# Patient Record
Sex: FEMALE | Race: WHITE | NOT HISPANIC OR LATINO | Employment: FULL TIME | URBAN - METROPOLITAN AREA
[De-identification: names, ages, dates, MRNs, and addresses within clinical notes are randomized per-mention and may not be internally consistent; named-entity substitution may affect disease eponyms.]

---

## 2017-05-16 ENCOUNTER — TELEPHONE (OUTPATIENT)
Dept: FAMILY MEDICINE | Facility: CLINIC | Age: 62
End: 2017-05-16

## 2017-05-16 NOTE — TELEPHONE ENCOUNTER
5/16/2017    Call Regarding Preventive Health Screening Mammogram    Attempt 1    Message No voicemail left - VIP      Comments: VIP Mammogram Patient - schedule in VIP slots only            Outreach   MARTIN

## 2017-05-23 NOTE — TELEPHONE ENCOUNTER
Patient declined. Per patient, completes all mammograms outside of [place].    Outreach ,  Natalie Edwards

## 2018-05-10 ENCOUNTER — TRANSFERRED RECORDS (OUTPATIENT)
Dept: HEALTH INFORMATION MANAGEMENT | Facility: CLINIC | Age: 63
End: 2018-05-10

## 2018-06-01 ENCOUNTER — TRANSFERRED RECORDS (OUTPATIENT)
Dept: HEALTH INFORMATION MANAGEMENT | Facility: CLINIC | Age: 63
End: 2018-06-01

## 2018-07-01 ENCOUNTER — TRANSFERRED RECORDS (OUTPATIENT)
Dept: HEALTH INFORMATION MANAGEMENT | Facility: CLINIC | Age: 63
End: 2018-07-01

## 2018-07-01 LAB — PAP SMEAR - HIM PATIENT REPORTED: NEGATIVE

## 2018-08-01 ENCOUNTER — TRANSFERRED RECORDS (OUTPATIENT)
Dept: HEALTH INFORMATION MANAGEMENT | Facility: CLINIC | Age: 63
End: 2018-08-01

## 2019-01-09 ENCOUNTER — OFFICE VISIT (OUTPATIENT)
Dept: FAMILY MEDICINE | Facility: CLINIC | Age: 64
End: 2019-01-09
Payer: COMMERCIAL

## 2019-01-09 VITALS
TEMPERATURE: 97.2 F | SYSTOLIC BLOOD PRESSURE: 156 MMHG | BODY MASS INDEX: 29.12 KG/M2 | HEART RATE: 60 BPM | WEIGHT: 175 LBS | DIASTOLIC BLOOD PRESSURE: 74 MMHG

## 2019-01-09 DIAGNOSIS — M62.830 SPASM OF BACK MUSCLES: ICD-10-CM

## 2019-01-09 DIAGNOSIS — M25.511 ACUTE PAIN OF RIGHT SHOULDER: Primary | ICD-10-CM

## 2019-01-09 PROCEDURE — 99214 OFFICE O/P EST MOD 30 MIN: CPT | Performed by: NURSE PRACTITIONER

## 2019-01-09 RX ORDER — CYCLOBENZAPRINE HCL 5 MG
5-10 TABLET ORAL 3 TIMES DAILY PRN
Qty: 40 TABLET | Refills: 0 | Status: ON HOLD | OUTPATIENT
Start: 2019-01-09 | End: 2019-03-11

## 2019-01-09 NOTE — PROGRESS NOTES
"  SUBJECTIVE:                                                      Yessy Kuhn is a 63 year old female who presents to clinic today for the following health issues:    Joint Pain    Onset: 2 weeks ago     Description:   Location: right shoulder  Character: \"deep pain\"     Intensity: severe, at night, moderate during the day     Progression of Symptoms: worse    Accompanying Signs & Symptoms:  Other symptoms: radiation of pain to lower arm    History:   Previous similar pain: YES      Precipitating factors:   Trauma or overuse: no     Alleviating factors:  Improved by: massage    Therapies Tried and outcome: PT, chiropractor, massage, left over oxycodone, ibuprofen, ice, heat       HPI: New right shoulder pain started about two weeks ago. Works out with a  at the gym, but otherwise, no recalled trauma or overuse pattern. Pain does get worse with workouts. She felt like she may have been getting slightly better until yesterday after she completed a fitness assessment at her gym. Describes the pain and \"deep\" within her upper back / shoulder. She has a lot of trouble buckling seatbelt and fastening bra strap. It is also present at rest and most pervasive while supine. It has kept her from getting much sleep in the past couple weeks. She also notes radiation of pain down her right arm and into her hand. No muscle weakness noted. Has longstanding neck issues, as well as knee problems. Has done PT, traction, massage, chiropractic care, etc. for these issues. No recent imaging of neck or shoulders. Her chiropractor does therapeutic neuromuscular massage, which has yet to prove to be of much benefit for her during this episode.     Problem list and histories reviewed & adjusted, as indicated.  Additional history: as documented    Reviewed and updated as needed this visit by clinical staff  Tobacco  Allergies  Meds  Problems  Med Hx  Surg Hx  Fam Hx       Reviewed and updated as needed this visit by " Provider  Tobacco  Allergies  Meds  Problems  Med Hx  Surg Hx  Fam Hx         ROS:  Constitutional, musculoskeletal, neuro systems are negative, except as otherwise noted.      OBJECTIVE:                                                      /74   Pulse 60   Temp 97.2  F (36.2  C) (Tympanic)   Wt 79.4 kg (175 lb)   LMP 04/17/2007 (Approximate)   BMI 29.12 kg/m   Body mass index is 29.12 kg/m .   GENERAL: healthy, alert, well nourished, well hydrated, no distress  MS: right upper back / shoulder. Palpable, tender myofascial knot near inferomedial scapular border. No masses, deformities, heat, erythema, crepitus, laxity, malalignment, or asymmetry associated with shoulder joint proper. Shoulder ROM limited by pain, especially with internal rotation.   NEURO: strength and tone- normal, sensory exam- grossly normal, mentation- intact, speech- normal, reflexes- symmetric    Diagnostic test results:  none     ASSESSMENT/PLAN:                                                      Yessy was seen today for shoulder pain. Not likely an intra-articular issue. Based on history and exam, most likely etiology is strain / spasm of a back muscle or rotator cuff muscle (teres minor or infraspinatus based on location of tenderness and knot). She is quite concerned given her musculoskeletal history and ongoing issues, so she would like to see sports medicine to see if imaging and other more aggressive modalities are indicated and available. This is reasonable. Referral order placed. In the meantime, suggest rest (no workout sessions), gentle stretch, alternation of ice and heat, continuing with chiro and PT, using Aleve 440 mg bid for a limited time, and using Flexeril for muscle spasm and sleep promotion. Discussed reasons to call or return to clinic. Yessy acknowledges and demonstrates understanding of circumstances under which care should be sought urgently or emergently. Follow up as discussed. Discussed risks,  benefits, alternatives, potential side effects, and proper administration of new medication / treatment. Agrees with plan of care. All questions answered.     Diagnoses and all orders for this visit:    Acute pain of right shoulder  -     cyclobenzaprine (FLEXERIL) 5 MG tablet; Take 1-2 tablets (5-10 mg) by mouth 3 times daily as needed for muscle spasms  -     SPORTS MEDICINE REFERRAL    Spasm of back muscles  -     cyclobenzaprine (FLEXERIL) 5 MG tablet; Take 1-2 tablets (5-10 mg) by mouth 3 times daily as needed for muscle spasms      Risks, benefits and alternatives of treatments discussed. Plan agreed upon and all questions answered.      Follow-Up: Return in about 4 weeks (around 2/6/2019).    See Patient Instructions        JAMMIE Pardo, CNP

## 2019-01-09 NOTE — PATIENT INSTRUCTIONS
1. Continue with chiro and PT.  2. Alternate ice and heat over upper back / shoulder  3. Use Aleve 440 mg twice a day for a week or two  4. Try muscle relaxant (no driving)  5. See sports medicine

## 2019-01-09 NOTE — Clinical Note
Please abstract the following data from this visit with this patient into the appropriate field in Epic:Mammogram done on this date: 6/2018 (approximately), by this group: [address] [place] [address] , results were normal. Dexa bone scan done at [address] [place] [address] 8/2018 results were normal

## 2019-03-04 ENCOUNTER — HOSPITAL ENCOUNTER (OUTPATIENT)
Dept: LAB | Facility: CLINIC | Age: 64
Discharge: HOME | End: 2019-03-04
Attending: ORTHOPAEDIC SURGERY | Admitting: ORTHOPAEDIC SURGERY
Payer: COMMERCIAL

## 2019-03-04 DIAGNOSIS — Z01.812 PRE-OPERATIVE LABORATORY EXAMINATION: ICD-10-CM

## 2019-03-04 LAB
MRSA DNA SPEC QL NAA+PROBE: NEGATIVE
SPECIMEN SOURCE: NORMAL

## 2019-03-04 PROCEDURE — 40000829 ZZHCL STATISTIC STAPH AUREUS SUSCEPT SCREEN PCR: Performed by: ORTHOPAEDIC SURGERY

## 2019-03-04 PROCEDURE — 87641 MR-STAPH DNA AMP PROBE: CPT | Performed by: ORTHOPAEDIC SURGERY

## 2019-03-04 PROCEDURE — 40000830 ZZHCL STATISTIC STAPH AUREUS METH RESIST SCREEN PCR: Performed by: ORTHOPAEDIC SURGERY

## 2019-03-04 PROCEDURE — 87640 STAPH A DNA AMP PROBE: CPT | Performed by: ORTHOPAEDIC SURGERY

## 2019-03-04 RX ORDER — ACETAMINOPHEN 325 MG/1
650 TABLET ORAL 2 TIMES DAILY PRN
COMMUNITY

## 2019-03-05 ENCOUNTER — TELEPHONE (OUTPATIENT)
Dept: FAMILY MEDICINE | Facility: CLINIC | Age: 64
End: 2019-03-05

## 2019-03-05 ENCOUNTER — OFFICE VISIT (OUTPATIENT)
Dept: FAMILY MEDICINE | Facility: CLINIC | Age: 64
End: 2019-03-05
Payer: COMMERCIAL

## 2019-03-05 VITALS
SYSTOLIC BLOOD PRESSURE: 130 MMHG | BODY MASS INDEX: 27.46 KG/M2 | TEMPERATURE: 96 F | OXYGEN SATURATION: 99 % | HEART RATE: 59 BPM | DIASTOLIC BLOOD PRESSURE: 80 MMHG | WEIGHT: 165 LBS

## 2019-03-05 DIAGNOSIS — M17.11 PRIMARY OSTEOARTHRITIS OF RIGHT KNEE: ICD-10-CM

## 2019-03-05 DIAGNOSIS — Z01.818 PREOPERATIVE EXAMINATION: Primary | ICD-10-CM

## 2019-03-05 LAB
ANION GAP SERPL CALCULATED.3IONS-SCNC: 7 MMOL/L (ref 3–14)
BUN SERPL-MCNC: 15 MG/DL (ref 7–30)
CALCIUM SERPL-MCNC: 9.4 MG/DL (ref 8.5–10.1)
CHLORIDE SERPL-SCNC: 107 MMOL/L (ref 94–109)
CO2 SERPL-SCNC: 27 MMOL/L (ref 20–32)
CREAT SERPL-MCNC: 0.65 MG/DL (ref 0.52–1.04)
GFR SERPL CREATININE-BSD FRML MDRD: >90 ML/MIN/{1.73_M2}
GLUCOSE SERPL-MCNC: 87 MG/DL (ref 70–99)
HGB BLD-MCNC: 13.3 G/DL (ref 11.7–15.7)
INR PPP: 1 (ref 0.86–1.14)
PLATELET # BLD AUTO: 179 10E9/L (ref 150–450)
POTASSIUM SERPL-SCNC: 4.2 MMOL/L (ref 3.4–5.3)
SODIUM SERPL-SCNC: 141 MMOL/L (ref 133–144)

## 2019-03-05 PROCEDURE — 99214 OFFICE O/P EST MOD 30 MIN: CPT | Performed by: NURSE PRACTITIONER

## 2019-03-05 PROCEDURE — 85610 PROTHROMBIN TIME: CPT | Performed by: NURSE PRACTITIONER

## 2019-03-05 PROCEDURE — 85018 HEMOGLOBIN: CPT | Performed by: NURSE PRACTITIONER

## 2019-03-05 PROCEDURE — 93000 ELECTROCARDIOGRAM COMPLETE: CPT | Performed by: NURSE PRACTITIONER

## 2019-03-05 PROCEDURE — 36415 COLL VENOUS BLD VENIPUNCTURE: CPT | Performed by: NURSE PRACTITIONER

## 2019-03-05 PROCEDURE — 85049 AUTOMATED PLATELET COUNT: CPT | Performed by: NURSE PRACTITIONER

## 2019-03-05 PROCEDURE — 80048 BASIC METABOLIC PNL TOTAL CA: CPT | Performed by: NURSE PRACTITIONER

## 2019-03-05 NOTE — PROGRESS NOTES
[facility] [place]  [address] [address]  [phone]  Dept: [phone]    PRE-OP EVALUATION:  Today's date: 3/5/2019    Yessy Kuhn (: 1955) presents for pre-operative evaluation assessment as requested by Dr. Cohn . She requires evaluation and anesthesia risk assessment prior to undergoing surgery/procedure for treatment of severe degenerative joint disease (right knee) .    Proposed Surgery/ Procedure: RIGHT TOTAL KNEE ARTHROPLASTY (OCONNELL AND NEPHEW)  Date of Surgery/ Procedure: 3/11/19  Time of Surgery/ Procedure: 10:05 am   Hospital/Surgical Facility: [facility]     Primary Physician: [facility], [facility]  Type of Anesthesia Anticipated: General    Patient has a Health Care Directive or Living Will:  NO    1. NO - Do you have a history of heart attack, stroke, stent, bypass or surgery on an artery in the head, neck, heart or legs?  2. NO - Do you ever have any pain or discomfort in your chest?  3. NO - Do you have a history of  Heart Failure?  4. NO - Are you troubled by shortness of breath when: walking on the level, up a slight hill or at night?  5. NO - Do you currently have a cold, bronchitis or other respiratory infection?  6. NO - Do you have a cough, shortness of breath or wheezing?  7. NO - Do you sometimes get pains in the calves of your legs when you walk?  8. NO - Do you or anyone in your family have previous history of blood clots?  9. NO - Do you or does anyone in your family have a serious bleeding problem such as prolonged bleeding following surgeries or cuts?  10. NO - Have you ever had problems with anemia or been told to take iron pills?  11. NO - Have you had any abnormal blood loss such as black, tarry or bloody stools, or abnormal vaginal bleeding?  12. NO - Have you ever had a blood transfusion?  13. NO - Have you or any of your relatives ever had problems with anesthesia?  14. NO - Do you have sleep apnea, excessive snoring  or daytime drowsiness?  15. NO - Do you have any prosthetic heart valves?  16. NO - Do you have prosthetic joints?  17. NO - Is there any chance that you may be pregnant?      HPI:     HPI related to upcoming procedure: Severe arthritis of right knee (progressive).      See problem list for active medical problems.  Problems all longstanding and stable, except as noted/documented.  See ROS for pertinent symptoms related to these conditions.                                                                                                                                                          .    MEDICAL HISTORY:     Patient Active Problem List    Diagnosis Date Noted     Obesity, Class II, BMI 35-39.9 04/18/2016     Priority: Medium     CARDIOVASCULAR SCREENING; LDL GOAL LESS THAN 160 10/31/2010     Priority: Medium     Herniated cervical intervertebral disc 07/16/2008     Priority: Medium     Mitral valve disorder      Priority: Medium     prolapse  Problem list name updated by automated process. Provider to review        Past Medical History:   Diagnosis Date     Irritable bowel syndrome     constipation     Mitral valve prolapse      Presence of intrauterine contraceptive device   1997 removed 2014     Past Surgical History:   Procedure Laterality Date     CHOLECYSTECTOMY, OPEN  1991     Current Outpatient Medications   Medication Sig Dispense Refill     acetaminophen (TYLENOL) 325 MG tablet Take 650 mg by mouth 2 times daily as needed for mild pain       melatonin 5 MG tablet Take 10 mg by mouth nightly as needed for sleep (2 x 5mg = 10mg)       Nutritional Supplements (NUTRITIONAL SUPPLEMENT PO) Take 2 capsules by mouth every morning Product contains Calcium 756mg                               Vitamin D 1,600 unit(s)                               Magnesium 386mg                               Vitamin K 100mcg                               Vitamin C 50mg       OTC products: None, except as noted  above    Allergies   Allergen Reactions     Antihistamine [Alkylamines]      Emotional reactions      Latex Allergy: NO    Social History     Tobacco Use     Smoking status: Never Smoker     Smokeless tobacco: Never Used   Substance Use Topics     Alcohol use: No     Alcohol/week: 0.0 oz     History   Drug Use No       REVIEW OF SYSTEMS:   CONSTITUTIONAL: NEGATIVE for fever, chills, change in weight  INTEGUMENTARY/SKIN: NEGATIVE for worrisome rashes, moles or lesions  EYES: NEGATIVE for vision changes or irritation  ENT/MOUTH: NEGATIVE for ear, mouth and throat problems  RESP: NEGATIVE for significant cough or SOB  BREAST: NEGATIVE for masses, tenderness or discharge  CV: NEGATIVE for chest pain, palpitations or peripheral edema  GI: NEGATIVE for nausea, abdominal pain, heartburn, or change in bowel habits  : NEGATIVE for frequency, dysuria, or hematuria  MUSCULOSKELETAL: NEGATIVE for significant arthralgias or myalgia (with the exception of ongoing pain / stiffness of right knee noted in HPI)  NEURO: NEGATIVE for weakness, dizziness or paresthesias  ENDOCRINE: NEGATIVE for temperature intolerance, skin/hair changes  HEME: NEGATIVE for bleeding problems  PSYCHIATRIC: NEGATIVE for changes in mood or affect    EXAM:   /80 (BP Location: Left arm, Patient Position: Chair, Cuff Size: Adult Regular)   Pulse 59   Temp 96  F (35.6  C) (Tympanic)   Wt 74.8 kg (165 lb)   LMP 04/17/2007 (Approximate)   SpO2 99%   BMI 27.46 kg/m      GENERAL APPEARANCE: healthy, alert and no distress     EYES: EOMI, PERRL     HENT: ear canals and TM's normal and nose and mouth without ulcers or lesions     NECK: no adenopathy, no asymmetry, masses, or scars and thyroid normal to palpation     RESP: lungs clear to auscultation - no rales, rhonchi or wheezes     CV: regular rates and rhythm, normal S1 S2, no S3 or S4. Grade II / VI systolic murmur     ABDOMEN:  soft, nontender, no HSM or masses and bowel sounds normal     MS:  extremities normal- no gross deformities noted, no evidence of inflammation in joints, FROM in all extremities.     SKIN: no suspicious lesions or rashes     NEURO: Normal strength and tone, sensory exam grossly normal, mentation intact and speech normal     PSYCH: mentation appears normal. and affect normal/bright     LYMPHATICS: No cervical adenopathy    DIAGNOSTICS:   EKG: appears normal, sinus bradycardia, normal axis, normal intervals, no acute ST/T changes c/w ischemia, no LVH by voltage criteria    Hemoglobin  Platelets  Serum Potassium  Serum Creatinine  INR    **These labs were ordered but results are pending at the time of this note's filing. Please see laboratory tab under patient chart review for most up to date lab results.     IMPRESSION:   Reason for surgery/procedure: Severe arthritis right knee  Diagnosis/reason for consult: Preoperative clearance    The proposed surgical procedure is considered INTERMEDIATE risk.    REVISED CARDIAC RISK INDEX  The patient has the following serious cardiovascular risks for perioperative complications such as (MI, PE, VFib and 3  AV Block):  No serious cardiac risks  INTERPRETATION: 0 risks: Class I (very low risk - 0.4% complication rate)    The patient has the following additional risks for perioperative complications:  No identified additional risks    **FYI - known systolic murmur associated with mitral valve prolapse. Has been stable, and she is not symptomatic in any way.      ICD-10-CM    1. Preoperative examination Z01.818 EKG 12-lead complete w/read - Clinics     Hemoglobin     Platelet count     INR     Basic metabolic panel   2. Primary osteoarthritis of right knee M17.11        RECOMMENDATIONS:     --Patient is to take all scheduled medications on the day of surgery EXCEPT for modifications listed below.    APPROVAL GIVEN to proceed with proposed procedure, without further diagnostic evaluation       Signed Electronically by: Jone Wright NP    Copy  of this evaluation report is provided to requesting physician.    [place] Preop Guidelines    Revised Cardiac Risk Index

## 2019-03-05 NOTE — TELEPHONE ENCOUNTER
Patient wanting health care directive notarized. I advised her what hours I will be here this week and that she can stop in at her convenience.   Reshma Uribe RN   [facility] - Triage

## 2019-03-05 NOTE — PATIENT INSTRUCTIONS
Before Your Surgery      Call your surgeon if there is any change in your health. This includes signs of a cold or flu (such as a sore throat, runny nose, cough, rash or fever).    Do not smoke, drink alcohol or take over the counter medicine (unless your surgeon or primary care doctor tells you to) for the 24 hours before and after surgery.    If you take prescribed drugs: Follow your doctor s orders about which medicines to take and which to stop until after surgery.    Eating and drinking prior to surgery: follow the instructions from your surgeon    Take a shower or bath the night before surgery. Use the soap your surgeon gave you to gently clean your skin. If you do not have soap from your surgeon, use your regular soap. Do not shave or scrub the surgery site.  Wear clean pajamas and have clean sheets on your bed.       Best of luck! Let me know if I can do anything else for you!

## 2019-03-05 NOTE — TELEPHONE ENCOUNTER
Reason for Call:  Other call back    Detailed comments: Pt wanting to come in to have a health care directive signed    Phone Number Patient can be reached at: Home number on file [phone] (home)    Best Time: anytime    Can we leave a detailed message on this number? YES    Call taken on 3/5/2019 at 7:58 AM by Rosanna Barrett

## 2019-03-05 NOTE — H&P (VIEW-ONLY)
[facility] [place]  [address] [address]  [phone]  Dept: [phone]    PRE-OP EVALUATION:  Today's date: 3/5/2019    Yessy Kuhn (: 1955) presents for pre-operative evaluation assessment as requested by Dr. Cohn . She requires evaluation and anesthesia risk assessment prior to undergoing surgery/procedure for treatment of severe degenerative joint disease (right knee) .    Proposed Surgery/ Procedure: RIGHT TOTAL KNEE ARTHROPLASTY (OCONNELL AND NEPHEW)  Date of Surgery/ Procedure: 3/11/19  Time of Surgery/ Procedure: 10:05 am   Hospital/Surgical Facility: [facility]     Primary Physician: [facility], [place] [facility] [place]  Type of Anesthesia Anticipated: General    Patient has a Health Care Directive or Living Will:  NO    1. NO - Do you have a history of heart attack, stroke, stent, bypass or surgery on an artery in the head, neck, heart or legs?  2. NO - Do you ever have any pain or discomfort in your chest?  3. NO - Do you have a history of  Heart Failure?  4. NO - Are you troubled by shortness of breath when: walking on the level, up a slight hill or at night?  5. NO - Do you currently have a cold, bronchitis or other respiratory infection?  6. NO - Do you have a cough, shortness of breath or wheezing?  7. NO - Do you sometimes get pains in the calves of your legs when you walk?  8. NO - Do you or anyone in your family have previous history of blood clots?  9. NO - Do you or does anyone in your family have a serious bleeding problem such as prolonged bleeding following surgeries or cuts?  10. NO - Have you ever had problems with anemia or been told to take iron pills?  11. NO - Have you had any abnormal blood loss such as black, tarry or bloody stools, or abnormal vaginal bleeding?  12. NO - Have you ever had a blood transfusion?  13. NO - Have you or any of your relatives ever had problems with anesthesia?  14. NO - Do you have sleep apnea, excessive snoring  or daytime drowsiness?  15. NO - Do you have any prosthetic heart valves?  16. NO - Do you have prosthetic joints?  17. NO - Is there any chance that you may be pregnant?      HPI:     HPI related to upcoming procedure: Severe arthritis of right knee (progressive).      See problem list for active medical problems.  Problems all longstanding and stable, except as noted/documented.  See ROS for pertinent symptoms related to these conditions.                                                                                                                                                          .    MEDICAL HISTORY:     Patient Active Problem List    Diagnosis Date Noted     Obesity, Class II, BMI 35-39.9 04/18/2016     Priority: Medium     CARDIOVASCULAR SCREENING; LDL GOAL LESS THAN 160 10/31/2010     Priority: Medium     Herniated cervical intervertebral disc 07/16/2008     Priority: Medium     Mitral valve disorder      Priority: Medium     prolapse  Problem list name updated by automated process. Provider to review        Past Medical History:   Diagnosis Date     Irritable bowel syndrome     constipation     Mitral valve prolapse      Presence of intrauterine contraceptive device   1997 removed 2014     Past Surgical History:   Procedure Laterality Date     CHOLECYSTECTOMY, OPEN  1991     Current Outpatient Medications   Medication Sig Dispense Refill     acetaminophen (TYLENOL) 325 MG tablet Take 650 mg by mouth 2 times daily as needed for mild pain       melatonin 5 MG tablet Take 10 mg by mouth nightly as needed for sleep (2 x 5mg = 10mg)       Nutritional Supplements (NUTRITIONAL SUPPLEMENT PO) Take 2 capsules by mouth every morning Product contains Calcium 756mg                               Vitamin D 1,600 unit(s)                               Magnesium 386mg                               Vitamin K 100mcg                               Vitamin C 50mg       OTC products: None, except as noted  above    Allergies   Allergen Reactions     Antihistamine [Alkylamines]      Emotional reactions      Latex Allergy: NO    Social History     Tobacco Use     Smoking status: Never Smoker     Smokeless tobacco: Never Used   Substance Use Topics     Alcohol use: No     Alcohol/week: 0.0 oz     History   Drug Use No       REVIEW OF SYSTEMS:   CONSTITUTIONAL: NEGATIVE for fever, chills, change in weight  INTEGUMENTARY/SKIN: NEGATIVE for worrisome rashes, moles or lesions  EYES: NEGATIVE for vision changes or irritation  ENT/MOUTH: NEGATIVE for ear, mouth and throat problems  RESP: NEGATIVE for significant cough or SOB  BREAST: NEGATIVE for masses, tenderness or discharge  CV: NEGATIVE for chest pain, palpitations or peripheral edema  GI: NEGATIVE for nausea, abdominal pain, heartburn, or change in bowel habits  : NEGATIVE for frequency, dysuria, or hematuria  MUSCULOSKELETAL: NEGATIVE for significant arthralgias or myalgia (with the exception of ongoing pain / stiffness of right knee noted in HPI)  NEURO: NEGATIVE for weakness, dizziness or paresthesias  ENDOCRINE: NEGATIVE for temperature intolerance, skin/hair changes  HEME: NEGATIVE for bleeding problems  PSYCHIATRIC: NEGATIVE for changes in mood or affect    EXAM:   /80 (BP Location: Left arm, Patient Position: Chair, Cuff Size: Adult Regular)   Pulse 59   Temp 96  F (35.6  C) (Tympanic)   Wt 74.8 kg (165 lb)   LMP 04/17/2007 (Approximate)   SpO2 99%   BMI 27.46 kg/m      GENERAL APPEARANCE: healthy, alert and no distress     EYES: EOMI, PERRL     HENT: ear canals and TM's normal and nose and mouth without ulcers or lesions     NECK: no adenopathy, no asymmetry, masses, or scars and thyroid normal to palpation     RESP: lungs clear to auscultation - no rales, rhonchi or wheezes     CV: regular rates and rhythm, normal S1 S2, no S3 or S4. Grade II / VI systolic murmur     ABDOMEN:  soft, nontender, no HSM or masses and bowel sounds normal     MS:  extremities normal- no gross deformities noted, no evidence of inflammation in joints, FROM in all extremities.     SKIN: no suspicious lesions or rashes     NEURO: Normal strength and tone, sensory exam grossly normal, mentation intact and speech normal     PSYCH: mentation appears normal. and affect normal/bright     LYMPHATICS: No cervical adenopathy    DIAGNOSTICS:   EKG: appears normal, sinus bradycardia, normal axis, normal intervals, no acute ST/T changes c/w ischemia, no LVH by voltage criteria    Hemoglobin  Platelets  Serum Potassium  Serum Creatinine  INR    **These labs were ordered but results are pending at the time of this note's filing. Please see laboratory tab under patient chart review for most up to date lab results.     IMPRESSION:   Reason for surgery/procedure: Severe arthritis right knee  Diagnosis/reason for consult: Preoperative clearance    The proposed surgical procedure is considered INTERMEDIATE risk.    REVISED CARDIAC RISK INDEX  The patient has the following serious cardiovascular risks for perioperative complications such as (MI, PE, VFib and 3  AV Block):  No serious cardiac risks  INTERPRETATION: 0 risks: Class I (very low risk - 0.4% complication rate)    The patient has the following additional risks for perioperative complications:  No identified additional risks    **FYI - known systolic murmur associated with mitral valve prolapse. Has been stable, and she is not symptomatic in any way.      ICD-10-CM    1. Preoperative examination Z01.818 EKG 12-lead complete w/read - Clinics     Hemoglobin     Platelet count     INR     Basic metabolic panel   2. Primary osteoarthritis of right knee M17.11        RECOMMENDATIONS:     --Patient is to take all scheduled medications on the day of surgery EXCEPT for modifications listed below.    APPROVAL GIVEN to proceed with proposed procedure, without further diagnostic evaluation       Signed Electronically by: Jone Wright NP    Copy  of this evaluation report is provided to requesting physician.    [facility] Preop Guidelines    Revised Cardiac Risk Index

## 2019-03-07 ENCOUNTER — ANESTHESIA EVENT (OUTPATIENT)
Dept: SURGERY | Facility: CLINIC | Age: 64
DRG: 470 | End: 2019-03-07
Payer: COMMERCIAL

## 2019-03-11 ENCOUNTER — APPOINTMENT (OUTPATIENT)
Dept: GENERAL RADIOLOGY | Facility: CLINIC | Age: 64
DRG: 470 | End: 2019-03-11
Attending: ORTHOPAEDIC SURGERY
Payer: COMMERCIAL

## 2019-03-11 ENCOUNTER — APPOINTMENT (OUTPATIENT)
Dept: PHYSICAL THERAPY | Facility: CLINIC | Age: 64
DRG: 470 | End: 2019-03-11
Attending: ORTHOPAEDIC SURGERY
Payer: COMMERCIAL

## 2019-03-11 ENCOUNTER — HOSPITAL ENCOUNTER (INPATIENT)
Facility: CLINIC | Age: 64
LOS: 2 days | Discharge: HOME | DRG: 470 | End: 2019-03-13
Attending: ORTHOPAEDIC SURGERY | Admitting: ORTHOPAEDIC SURGERY
Payer: COMMERCIAL

## 2019-03-11 ENCOUNTER — ANESTHESIA (OUTPATIENT)
Dept: SURGERY | Facility: CLINIC | Age: 64
DRG: 470 | End: 2019-03-11
Payer: COMMERCIAL

## 2019-03-11 DIAGNOSIS — Z96.651 STATUS POST TOTAL RIGHT KNEE REPLACEMENT: Primary | ICD-10-CM

## 2019-03-11 PROBLEM — Z96.659 S/P TOTAL KNEE ARTHROPLASTY: Status: ACTIVE | Noted: 2019-03-11

## 2019-03-11 LAB
ANION GAP SERPL CALCULATED.3IONS-SCNC: 7 MMOL/L (ref 3–14)
BUN SERPL-MCNC: 13 MG/DL (ref 7–30)
CALCIUM SERPL-MCNC: 8.7 MG/DL (ref 8.5–10.1)
CHLORIDE SERPL-SCNC: 106 MMOL/L (ref 94–109)
CO2 SERPL-SCNC: 27 MMOL/L (ref 20–32)
CREAT SERPL-MCNC: 0.67 MG/DL (ref 0.52–1.04)
GFR SERPL CREATININE-BSD FRML MDRD: >90 ML/MIN/{1.73_M2}
GLUCOSE SERPL-MCNC: 99 MG/DL (ref 70–99)
HGB BLD-MCNC: 12.8 G/DL (ref 11.7–15.7)
PLATELET # BLD AUTO: 149 10E9/L (ref 150–450)
POTASSIUM SERPL-SCNC: 3.7 MMOL/L (ref 3.4–5.3)
SODIUM SERPL-SCNC: 140 MMOL/L (ref 133–144)

## 2019-03-11 PROCEDURE — 25000125 ZZHC RX 250: Performed by: ANESTHESIOLOGY

## 2019-03-11 PROCEDURE — 25000566 ZZH SEVOFLURANE, EA 15 MIN: Performed by: ORTHOPAEDIC SURGERY

## 2019-03-11 PROCEDURE — 80048 BASIC METABOLIC PNL TOTAL CA: CPT | Performed by: ORTHOPAEDIC SURGERY

## 2019-03-11 PROCEDURE — 25000128 H RX IP 250 OP 636: Performed by: ANESTHESIOLOGY

## 2019-03-11 PROCEDURE — 25000128 H RX IP 250 OP 636: Performed by: NURSE ANESTHETIST, CERTIFIED REGISTERED

## 2019-03-11 PROCEDURE — 37000009 ZZH ANESTHESIA TECHNICAL FEE, EACH ADDTL 15 MIN: Performed by: ORTHOPAEDIC SURGERY

## 2019-03-11 PROCEDURE — 82565 ASSAY OF CREATININE: CPT | Performed by: ORTHOPAEDIC SURGERY

## 2019-03-11 PROCEDURE — 0SRC0J9 REPLACEMENT OF RIGHT KNEE JOINT WITH SYNTHETIC SUBSTITUTE, CEMENTED, OPEN APPROACH: ICD-10-PCS | Performed by: ORTHOPAEDIC SURGERY

## 2019-03-11 PROCEDURE — 71000012 ZZH RECOVERY PHASE 1 LEVEL 1 FIRST HR: Performed by: ORTHOPAEDIC SURGERY

## 2019-03-11 PROCEDURE — 25000125 ZZHC RX 250: Performed by: NURSE ANESTHETIST, CERTIFIED REGISTERED

## 2019-03-11 PROCEDURE — 25000125 ZZHC RX 250: Performed by: ORTHOPAEDIC SURGERY

## 2019-03-11 PROCEDURE — 25000132 ZZH RX MED GY IP 250 OP 250 PS 637: Performed by: ORTHOPAEDIC SURGERY

## 2019-03-11 PROCEDURE — 85018 HEMOGLOBIN: CPT | Performed by: ORTHOPAEDIC SURGERY

## 2019-03-11 PROCEDURE — 36000063 ZZH SURGERY LEVEL 4 EA 15 ADDTL MIN: Performed by: ORTHOPAEDIC SURGERY

## 2019-03-11 PROCEDURE — 25000128 H RX IP 250 OP 636: Performed by: ORTHOPAEDIC SURGERY

## 2019-03-11 PROCEDURE — 97162 PT EVAL MOD COMPLEX 30 MIN: CPT | Mod: GP

## 2019-03-11 PROCEDURE — 12000000 ZZH R&B MED SURG/OB

## 2019-03-11 PROCEDURE — 25800030 ZZH RX IP 258 OP 636: Performed by: ORTHOPAEDIC SURGERY

## 2019-03-11 PROCEDURE — 25800030 ZZH RX IP 258 OP 636: Performed by: ANESTHESIOLOGY

## 2019-03-11 PROCEDURE — 27210794 ZZH OR GENERAL SUPPLY STERILE: Performed by: ORTHOPAEDIC SURGERY

## 2019-03-11 PROCEDURE — 25800025 ZZH RX 258: Performed by: ORTHOPAEDIC SURGERY

## 2019-03-11 PROCEDURE — 27110028 ZZH OR GENERAL SUPPLY NON-STERILE: Performed by: ORTHOPAEDIC SURGERY

## 2019-03-11 PROCEDURE — 36000093 ZZH SURGERY LEVEL 4 1ST 30 MIN: Performed by: ORTHOPAEDIC SURGERY

## 2019-03-11 PROCEDURE — 40000986 XR KNEE PORT RT 1/2 VW: Mod: RT

## 2019-03-11 PROCEDURE — 25800030 ZZH RX IP 258 OP 636: Performed by: NURSE ANESTHETIST, CERTIFIED REGISTERED

## 2019-03-11 PROCEDURE — 97530 THERAPEUTIC ACTIVITIES: CPT | Mod: GP

## 2019-03-11 PROCEDURE — 85049 AUTOMATED PLATELET COUNT: CPT | Performed by: ORTHOPAEDIC SURGERY

## 2019-03-11 PROCEDURE — 97110 THERAPEUTIC EXERCISES: CPT | Mod: GP

## 2019-03-11 PROCEDURE — 36415 COLL VENOUS BLD VENIPUNCTURE: CPT | Performed by: ORTHOPAEDIC SURGERY

## 2019-03-11 PROCEDURE — 27810169 ZZH OR IMPLANT GENERAL: Performed by: ORTHOPAEDIC SURGERY

## 2019-03-11 PROCEDURE — C1776 JOINT DEVICE (IMPLANTABLE): HCPCS | Performed by: ORTHOPAEDIC SURGERY

## 2019-03-11 PROCEDURE — 37000008 ZZH ANESTHESIA TECHNICAL FEE, 1ST 30 MIN: Performed by: ORTHOPAEDIC SURGERY

## 2019-03-11 PROCEDURE — 40000170 ZZH STATISTIC PRE-PROCEDURE ASSESSMENT II: Performed by: ORTHOPAEDIC SURGERY

## 2019-03-11 DEVICE — IMP INSERT ARTICULAR S&N LGN CR XLPE SZ3-4 9MM 71453111: Type: IMPLANTABLE DEVICE | Site: KNEE | Status: FUNCTIONAL

## 2019-03-11 DEVICE — IMP COMP PATELLA SNR GENESIS II 9X32MM 71420576: Type: IMPLANTABLE DEVICE | Site: KNEE | Status: FUNCTIONAL

## 2019-03-11 DEVICE — IMP BASEPLATE TIBIAL GENESIS II SZ 4 RT TI 71420184: Type: IMPLANTABLE DEVICE | Site: KNEE | Status: FUNCTIONAL

## 2019-03-11 DEVICE — IMP COMP FEM S&N LEGION OXIN NARROW CR SZ5N RT 71421255: Type: IMPLANTABLE DEVICE | Site: KNEE | Status: FUNCTIONAL

## 2019-03-11 DEVICE — BONE CEMENT RADIOPAQUE SIMPLEX HV FULL DOSE 6194-1-001: Type: IMPLANTABLE DEVICE | Site: KNEE | Status: FUNCTIONAL

## 2019-03-11 RX ORDER — ACETAMINOPHEN 325 MG/1
975 TABLET ORAL EVERY 8 HOURS
Status: DISCONTINUED | OUTPATIENT
Start: 2019-03-11 | End: 2019-03-13 | Stop reason: HOSPADM

## 2019-03-11 RX ORDER — NALOXONE HYDROCHLORIDE 0.4 MG/ML
.1-.4 INJECTION, SOLUTION INTRAMUSCULAR; INTRAVENOUS; SUBCUTANEOUS
Status: DISCONTINUED | OUTPATIENT
Start: 2019-03-11 | End: 2019-03-13 | Stop reason: HOSPADM

## 2019-03-11 RX ORDER — ONDANSETRON 4 MG/1
4 TABLET, ORALLY DISINTEGRATING ORAL EVERY 30 MIN PRN
Status: DISCONTINUED | OUTPATIENT
Start: 2019-03-11 | End: 2019-03-11

## 2019-03-11 RX ORDER — ONDANSETRON 4 MG/1
4 TABLET, ORALLY DISINTEGRATING ORAL EVERY 6 HOURS PRN
Status: DISCONTINUED | OUTPATIENT
Start: 2019-03-11 | End: 2019-03-13 | Stop reason: HOSPADM

## 2019-03-11 RX ORDER — LABETALOL HYDROCHLORIDE 5 MG/ML
10 INJECTION, SOLUTION INTRAVENOUS
Status: DISCONTINUED | OUTPATIENT
Start: 2019-03-11 | End: 2019-03-11

## 2019-03-11 RX ORDER — ZOLPIDEM TARTRATE 5 MG/1
5 TABLET ORAL
Status: DISCONTINUED | OUTPATIENT
Start: 2019-03-12 | End: 2019-03-13 | Stop reason: HOSPADM

## 2019-03-11 RX ORDER — FENTANYL CITRATE 50 UG/ML
100 INJECTION, SOLUTION INTRAMUSCULAR; INTRAVENOUS ONCE
Status: COMPLETED | OUTPATIENT
Start: 2019-03-11 | End: 2019-03-11

## 2019-03-11 RX ORDER — KETOROLAC TROMETHAMINE 30 MG/ML
30 INJECTION, SOLUTION INTRAMUSCULAR; INTRAVENOUS
Status: DISCONTINUED | OUTPATIENT
Start: 2019-03-11 | End: 2019-03-11

## 2019-03-11 RX ORDER — ONDANSETRON 2 MG/ML
4 INJECTION INTRAMUSCULAR; INTRAVENOUS EVERY 6 HOURS PRN
Status: DISCONTINUED | OUTPATIENT
Start: 2019-03-11 | End: 2019-03-13 | Stop reason: HOSPADM

## 2019-03-11 RX ORDER — HYDROMORPHONE HYDROCHLORIDE 1 MG/ML
.3-.5 INJECTION, SOLUTION INTRAMUSCULAR; INTRAVENOUS; SUBCUTANEOUS
Status: DISCONTINUED | OUTPATIENT
Start: 2019-03-11 | End: 2019-03-13 | Stop reason: HOSPADM

## 2019-03-11 RX ORDER — ONDANSETRON 2 MG/ML
INJECTION INTRAMUSCULAR; INTRAVENOUS PRN
Status: DISCONTINUED | OUTPATIENT
Start: 2019-03-11 | End: 2019-03-11

## 2019-03-11 RX ORDER — PROCHLORPERAZINE MALEATE 5 MG
10 TABLET ORAL EVERY 6 HOURS PRN
Status: DISCONTINUED | OUTPATIENT
Start: 2019-03-11 | End: 2019-03-13 | Stop reason: HOSPADM

## 2019-03-11 RX ORDER — CEFAZOLIN SODIUM 1 G/3ML
1 INJECTION, POWDER, FOR SOLUTION INTRAMUSCULAR; INTRAVENOUS EVERY 8 HOURS
Status: COMPLETED | OUTPATIENT
Start: 2019-03-11 | End: 2019-03-12

## 2019-03-11 RX ORDER — OXYCODONE HYDROCHLORIDE 5 MG/1
5-10 TABLET ORAL
Status: DISCONTINUED | OUTPATIENT
Start: 2019-03-11 | End: 2019-03-12

## 2019-03-11 RX ORDER — NALOXONE HYDROCHLORIDE 0.4 MG/ML
.1-.4 INJECTION, SOLUTION INTRAMUSCULAR; INTRAVENOUS; SUBCUTANEOUS
Status: DISCONTINUED | OUTPATIENT
Start: 2019-03-11 | End: 2019-03-12

## 2019-03-11 RX ORDER — PROPOFOL 10 MG/ML
INJECTION, EMULSION INTRAVENOUS PRN
Status: DISCONTINUED | OUTPATIENT
Start: 2019-03-11 | End: 2019-03-11

## 2019-03-11 RX ORDER — SODIUM CHLORIDE, SODIUM LACTATE, POTASSIUM CHLORIDE, CALCIUM CHLORIDE 600; 310; 30; 20 MG/100ML; MG/100ML; MG/100ML; MG/100ML
INJECTION, SOLUTION INTRAVENOUS CONTINUOUS
Status: DISCONTINUED | OUTPATIENT
Start: 2019-03-11 | End: 2019-03-11 | Stop reason: ALTCHOICE

## 2019-03-11 RX ORDER — ONDANSETRON 2 MG/ML
4 INJECTION INTRAMUSCULAR; INTRAVENOUS EVERY 30 MIN PRN
Status: DISCONTINUED | OUTPATIENT
Start: 2019-03-11 | End: 2019-03-11

## 2019-03-11 RX ORDER — KETOROLAC TROMETHAMINE 30 MG/ML
30 INJECTION, SOLUTION INTRAMUSCULAR; INTRAVENOUS EVERY 6 HOURS
Status: COMPLETED | OUTPATIENT
Start: 2019-03-11 | End: 2019-03-12

## 2019-03-11 RX ORDER — ACETAMINOPHEN 325 MG/1
650 TABLET ORAL EVERY 4 HOURS PRN
Status: DISCONTINUED | OUTPATIENT
Start: 2019-03-14 | End: 2019-03-13 | Stop reason: HOSPADM

## 2019-03-11 RX ORDER — CEFAZOLIN SODIUM 1 G/3ML
1 INJECTION, POWDER, FOR SOLUTION INTRAMUSCULAR; INTRAVENOUS SEE ADMIN INSTRUCTIONS
Status: DISCONTINUED | OUTPATIENT
Start: 2019-03-11 | End: 2019-03-11

## 2019-03-11 RX ORDER — EPHEDRINE SULFATE 50 MG/ML
INJECTION, SOLUTION INTRAMUSCULAR; INTRAVENOUS; SUBCUTANEOUS PRN
Status: DISCONTINUED | OUTPATIENT
Start: 2019-03-11 | End: 2019-03-11

## 2019-03-11 RX ORDER — LIDOCAINE 40 MG/G
CREAM TOPICAL
Status: DISCONTINUED | OUTPATIENT
Start: 2019-03-11 | End: 2019-03-13 | Stop reason: HOSPADM

## 2019-03-11 RX ORDER — HYDROXYZINE HYDROCHLORIDE 25 MG/1
50 TABLET, FILM COATED ORAL EVERY 4 HOURS PRN
Status: DISCONTINUED | OUTPATIENT
Start: 2019-03-11 | End: 2019-03-13 | Stop reason: HOSPADM

## 2019-03-11 RX ORDER — FENTANYL CITRATE 50 UG/ML
INJECTION, SOLUTION INTRAMUSCULAR; INTRAVENOUS PRN
Status: DISCONTINUED | OUTPATIENT
Start: 2019-03-11 | End: 2019-03-11

## 2019-03-11 RX ORDER — FENTANYL CITRATE 50 UG/ML
25-50 INJECTION, SOLUTION INTRAMUSCULAR; INTRAVENOUS
Status: DISCONTINUED | OUTPATIENT
Start: 2019-03-11 | End: 2019-03-11

## 2019-03-11 RX ORDER — HYDROMORPHONE HYDROCHLORIDE 1 MG/ML
.3-.5 INJECTION, SOLUTION INTRAMUSCULAR; INTRAVENOUS; SUBCUTANEOUS EVERY 5 MIN PRN
Status: DISCONTINUED | OUTPATIENT
Start: 2019-03-11 | End: 2019-03-11

## 2019-03-11 RX ORDER — DEXAMETHASONE SODIUM PHOSPHATE 4 MG/ML
4 INJECTION, SOLUTION INTRA-ARTICULAR; INTRALESIONAL; INTRAMUSCULAR; INTRAVENOUS; SOFT TISSUE
Status: DISCONTINUED | OUTPATIENT
Start: 2019-03-11 | End: 2019-03-11

## 2019-03-11 RX ORDER — PROPOFOL 10 MG/ML
INJECTION, EMULSION INTRAVENOUS CONTINUOUS PRN
Status: DISCONTINUED | OUTPATIENT
Start: 2019-03-11 | End: 2019-03-11

## 2019-03-11 RX ORDER — HYDRALAZINE HYDROCHLORIDE 20 MG/ML
2.5-5 INJECTION INTRAMUSCULAR; INTRAVENOUS EVERY 10 MIN PRN
Status: DISCONTINUED | OUTPATIENT
Start: 2019-03-11 | End: 2019-03-11

## 2019-03-11 RX ORDER — DIPHENHYDRAMINE HCL 25 MG
25 CAPSULE ORAL EVERY 6 HOURS PRN
Status: DISCONTINUED | OUTPATIENT
Start: 2019-03-11 | End: 2019-03-13 | Stop reason: HOSPADM

## 2019-03-11 RX ORDER — AMOXICILLIN 250 MG
2 CAPSULE ORAL 2 TIMES DAILY
Status: DISCONTINUED | OUTPATIENT
Start: 2019-03-11 | End: 2019-03-13 | Stop reason: HOSPADM

## 2019-03-11 RX ORDER — AMOXICILLIN 250 MG
1 CAPSULE ORAL 2 TIMES DAILY
Status: DISCONTINUED | OUTPATIENT
Start: 2019-03-11 | End: 2019-03-13 | Stop reason: HOSPADM

## 2019-03-11 RX ORDER — HYDROXYZINE HYDROCHLORIDE 25 MG/1
25 TABLET, FILM COATED ORAL EVERY 6 HOURS PRN
Status: DISCONTINUED | OUTPATIENT
Start: 2019-03-11 | End: 2019-03-13 | Stop reason: HOSPADM

## 2019-03-11 RX ORDER — DEXTROSE MONOHYDRATE, SODIUM CHLORIDE, AND POTASSIUM CHLORIDE 50; 1.49; 4.5 G/1000ML; G/1000ML; G/1000ML
INJECTION, SOLUTION INTRAVENOUS CONTINUOUS
Status: DISCONTINUED | OUTPATIENT
Start: 2019-03-11 | End: 2019-03-12 | Stop reason: CLARIF

## 2019-03-11 RX ORDER — DIPHENHYDRAMINE HYDROCHLORIDE 50 MG/ML
25 INJECTION INTRAMUSCULAR; INTRAVENOUS EVERY 6 HOURS PRN
Status: DISCONTINUED | OUTPATIENT
Start: 2019-03-11 | End: 2019-03-13 | Stop reason: HOSPADM

## 2019-03-11 RX ORDER — CEFAZOLIN SODIUM 2 G/100ML
2 INJECTION, SOLUTION INTRAVENOUS
Status: COMPLETED | OUTPATIENT
Start: 2019-03-11 | End: 2019-03-11

## 2019-03-11 RX ADMIN — ONDANSETRON 4 MG: 2 INJECTION INTRAMUSCULAR; INTRAVENOUS at 12:17

## 2019-03-11 RX ADMIN — PHENYLEPHRINE HYDROCHLORIDE 100 MCG: 10 INJECTION, SOLUTION INTRAMUSCULAR; INTRAVENOUS; SUBCUTANEOUS at 12:07

## 2019-03-11 RX ADMIN — PROPOFOL 30 MG: 10 INJECTION, EMULSION INTRAVENOUS at 10:59

## 2019-03-11 RX ADMIN — CEFAZOLIN SODIUM 2 G: 2 INJECTION, SOLUTION INTRAVENOUS at 11:00

## 2019-03-11 RX ADMIN — Medication 5 MG: at 11:11

## 2019-03-11 RX ADMIN — LIDOCAINE HYDROCHLORIDE 1 ML: 10 INJECTION, SOLUTION EPIDURAL; INFILTRATION; INTRACAUDAL; PERINEURAL at 09:58

## 2019-03-11 RX ADMIN — SODIUM CHLORIDE, POTASSIUM CHLORIDE, SODIUM LACTATE AND CALCIUM CHLORIDE: 600; 310; 30; 20 INJECTION, SOLUTION INTRAVENOUS at 11:53

## 2019-03-11 RX ADMIN — PHENYLEPHRINE HYDROCHLORIDE 50 MCG: 10 INJECTION, SOLUTION INTRAMUSCULAR; INTRAVENOUS; SUBCUTANEOUS at 11:15

## 2019-03-11 RX ADMIN — PHENYLEPHRINE HYDROCHLORIDE 100 MCG: 10 INJECTION, SOLUTION INTRAMUSCULAR; INTRAVENOUS; SUBCUTANEOUS at 11:44

## 2019-03-11 RX ADMIN — KETOROLAC TROMETHAMINE 30 MG: 30 INJECTION, SOLUTION INTRAMUSCULAR at 15:20

## 2019-03-11 RX ADMIN — SENNOSIDES AND DOCUSATE SODIUM 2 TABLET: 8.6; 5 TABLET ORAL at 19:58

## 2019-03-11 RX ADMIN — OXYCODONE HYDROCHLORIDE 5 MG: 5 TABLET ORAL at 19:58

## 2019-03-11 RX ADMIN — Medication 10 MG: at 11:15

## 2019-03-11 RX ADMIN — PROPOFOL 100 MCG/KG/MIN: 10 INJECTION, EMULSION INTRAVENOUS at 10:57

## 2019-03-11 RX ADMIN — MIDAZOLAM HYDROCHLORIDE 2 MG: 1 INJECTION, SOLUTION INTRAMUSCULAR; INTRAVENOUS at 09:58

## 2019-03-11 RX ADMIN — ACETAMINOPHEN 975 MG: 325 TABLET, FILM COATED ORAL at 15:20

## 2019-03-11 RX ADMIN — FENTANYL CITRATE 50 MCG: 50 INJECTION, SOLUTION INTRAMUSCULAR; INTRAVENOUS at 09:58

## 2019-03-11 RX ADMIN — MIDAZOLAM 2 MG: 1 INJECTION INTRAMUSCULAR; INTRAVENOUS at 10:50

## 2019-03-11 RX ADMIN — CEFAZOLIN 1 G: 1 INJECTION, POWDER, FOR SOLUTION INTRAMUSCULAR; INTRAVENOUS at 19:22

## 2019-03-11 RX ADMIN — POTASSIUM CHLORIDE, DEXTROSE MONOHYDRATE AND SODIUM CHLORIDE: 150; 5; 450 INJECTION, SOLUTION INTRAVENOUS at 15:20

## 2019-03-11 RX ADMIN — PHENYLEPHRINE HYDROCHLORIDE 100 MCG: 10 INJECTION, SOLUTION INTRAMUSCULAR; INTRAVENOUS; SUBCUTANEOUS at 11:34

## 2019-03-11 RX ADMIN — PHENYLEPHRINE HYDROCHLORIDE 50 MCG: 10 INJECTION, SOLUTION INTRAMUSCULAR; INTRAVENOUS; SUBCUTANEOUS at 11:26

## 2019-03-11 RX ADMIN — PHENYLEPHRINE HYDROCHLORIDE 100 MCG: 10 INJECTION, SOLUTION INTRAMUSCULAR; INTRAVENOUS; SUBCUTANEOUS at 11:53

## 2019-03-11 RX ADMIN — SODIUM CHLORIDE 1 G: 9 INJECTION, SOLUTION INTRAVENOUS at 11:10

## 2019-03-11 RX ADMIN — FENTANYL CITRATE 50 MCG: 50 INJECTION, SOLUTION INTRAMUSCULAR; INTRAVENOUS at 10:57

## 2019-03-11 RX ADMIN — SODIUM CHLORIDE, POTASSIUM CHLORIDE, SODIUM LACTATE AND CALCIUM CHLORIDE: 600; 310; 30; 20 INJECTION, SOLUTION INTRAVENOUS at 09:58

## 2019-03-11 RX ADMIN — Medication 0.5 MG: at 22:02

## 2019-03-11 RX ADMIN — SODIUM CHLORIDE 1 G: 9 INJECTION, SOLUTION INTRAVENOUS at 12:07

## 2019-03-11 RX ADMIN — Medication 5 MG: at 11:26

## 2019-03-11 RX ADMIN — KETOROLAC TROMETHAMINE 30 MG: 30 INJECTION, SOLUTION INTRAMUSCULAR at 19:58

## 2019-03-11 ASSESSMENT — MIFFLIN-ST. JEOR: SCORE: 1305.24

## 2019-03-11 ASSESSMENT — ACTIVITIES OF DAILY LIVING (ADL)
ADLS_ACUITY_SCORE: 11
ADLS_ACUITY_SCORE: 11

## 2019-03-11 NOTE — ANESTHESIA PREPROCEDURE EVALUATION
Anesthesia Pre-Procedure Evaluation    Patient: Yessy Kuhn   MRN: [id number] : 1955          Preoperative Diagnosis: RIGHT KNEE DJD    Procedure(s):  RIGHT TOTAL KNEE ARTHROPLASTY (SMITH AND NEPHEW)^    Past Medical History:   Diagnosis Date     Irritable bowel syndrome     constipation     Mitral valve prolapse      PONV (postoperative nausea and vomiting)      Presence of intrauterine contraceptive device       removed      Past Surgical History:   Procedure Laterality Date     CHOLECYSTECTOMY, OPEN         Anesthesia Evaluation     . Pt has had prior anesthetic. Type: General    History of anesthetic complications   - PONV        ROS/MED HX    ENT/Pulmonary:  - neg pulmonary ROS    (-) sleep apnea   Neurologic:  - neg neurologic ROS     Cardiovascular:     (+) ----. : . . . :. valvular problems/murmurs type: MVP .      (-) hypertension   METS/Exercise Tolerance:     Hematologic:  - neg hematologic  ROS       Musculoskeletal:   (+) arthritis, , , -       GI/Hepatic:     (+) cholecystitis/cholelithiasis,      (-) GERD   Renal/Genitourinary:  - ROS Renal section negative       Endo:  - neg endo ROS       Psychiatric:         Infectious Disease:         Malignancy:         Other:                          Physical Exam  Normal systems: pulmonary    Airway   Mallampati: II  TM distance: >3 FB  Neck ROM: full    Dental   (+) caps    Cardiovascular       Pulmonary             Lab Results   Component Value Date    WBC 3.2 (L) 2016    HGB 12.8 2019    HCT 41.3 2016     2019     2019    POTASSIUM 3.7 2019    CHLORIDE 106 2019    CO2 27 2019    BUN 13 2019    CR 0.67 2019    GLC 99 2019    KRISTEN 8.7 2019    ALBUMIN 4.1 2016    PROTTOTAL 6.7 (L) 2016    ALT 24 2016    AST 17 2016    ALKPHOS 114 2016    BILITOTAL 0.8 2016    INR 1.00 2019    TSH 2.27 2016       Preop  "Vitals  BP Readings from Last 3 Encounters:   03/11/19 156/68   03/05/19 130/80   01/09/19 156/74    Pulse Readings from Last 3 Encounters:   03/11/19 78   03/05/19 59   01/09/19 60      Resp Readings from Last 3 Encounters:   03/11/19 16   04/29/16 16   03/09/12 18    SpO2 Readings from Last 3 Encounters:   03/11/19 98%   03/05/19 99%      Temp Readings from Last 1 Encounters:   03/11/19 36.5  C (97.7  F) (Temporal)    Ht Readings from Last 1 Encounters:   03/11/19 1.651 m (5' 5\")      Wt Readings from Last 1 Encounters:   03/11/19 73.9 kg (163 lb)    Estimated body mass index is 27.12 kg/m  as calculated from the following:    Height as of this encounter: 1.651 m (5' 5\").    Weight as of this encounter: 73.9 kg (163 lb).       Anesthesia Plan      History & Physical Review  History and physical reviewed and following examination; no interval change.    ASA Status:  2 .    NPO Status:  > 8 hours    Plan for Spinal   PONV prophylaxis:  Ondansetron (or other 5HT-3) and Dexamethasone or Solumedrol       Postoperative Care  Postoperative pain management:  Multi-modal analgesia.      Consents  Anesthetic plan, risks, benefits and alternatives discussed with:  Patient..                 Bobby Baird MD  "

## 2019-03-11 NOTE — PROGRESS NOTES
Admission medication history interview status for the 3/11/2019  admission is complete. See EPIC admission navigator for prior to admission medications     Medication history source reliability:Good    Medication history interview source(s):Patient    Medication history resources (including written lists, pill bottles, clinic record):None    Primary pharmacy.[facility]    Additional medication history information not noted on PTA med list :None    Time spent in this activity: 45 minutes    Prior to Admission medications    Medication Sig Last Dose Taking? Auth Provider   acetaminophen (TYLENOL) 325 MG tablet Take 650 mg by mouth 2 times daily as needed for mild pain 3/6/2019 at prn Yes Reported, Patient   melatonin 5 MG tablet Take 10 mg by mouth nightly as needed for sleep (2 x 5mg = 10mg) 3/8/2019 at prn Yes Reported, Patient   Nutritional Supplements (NUTRITIONAL SUPPLEMENT PO) Take 2 capsules by mouth every morning Product contains Calcium 756mg                               Vitamin D 1,600 unit(s)                               Magnesium 386mg                               Vitamin K 100mcg                               Vitamin C 50mg more than a week Yes Reported, Patient

## 2019-03-11 NOTE — ANESTHESIA PROCEDURE NOTES
Peripheral nerve/Neuraxial procedure note : intrathecal  Pre-Procedure  Performed by Bobby Baird MD  Location: OR      Pre-Anesthestic Checklist: patient identified, IV checked, site marked, risks and benefits discussed, informed consent, monitors and equipment checked, pre-op evaluation, at physician/surgeon's request and post-op pain management    Timeout  Correct Patient: Yes   Correct Procedure: Yes   Correct Site: Yes   Correct Laterality: N/A   Correct Position: Yes   Site Marked: N/A   .   Procedure Documentation    .    Procedure:    Intrathecal.  Insertion Site:L3-4  (midline approach)      Patient Prep;mask, sterile gloves, povidone-iodine 7.5% surgical scrub, patient draped.  .  Needle: Avelina-Chente Spinal Needle (gauge): 24  Spinal/LP Needle Length (inches): 3 # of attempts: 1 and # of redirects: : 0. Introducer used Introducer: 20 G .       Assessment/Narrative  Paresthesias: No.  .  .  clear CSF fluid removed . Comments:  12.5 mg 0.75% BUPIVACAINE WITH 8.25% DEXTROSE INJECTED. No paresthesia on injection. Patient tolerated the procedure well.

## 2019-03-11 NOTE — ANESTHESIA CARE TRANSFER NOTE
Patient: Yessy Kuhn    Procedure(s):  RIGHT TOTAL KNEE ARTHROPLASTY    Diagnosis: RIGHT KNEE DJD  Diagnosis Additional Information: No value filed.    Anesthesia Type:   Spinal     Note:  Airway :Room Air  Patient transferred to:PACU  Comments: At end of procedure, spontaneous respirations, patient alert to voice, able to follow commands. Patient breathing room air  to PACU. SpO2, NiBP, and EKG monitors and alarms on and functioning, Dimple Hugger warmer connected to patient gown, report on patient's clinical status given to PACU RN, RN questions answered.Handoff Report: Identifed the Patient, Identified the Reponsible Provider, Reviewed the pertinent medical history, Discussed the surgical course, Reviewed Intra-OP anesthesia mangement and issues during anesthesia, Set expectations for post-procedure period and Allowed opportunity for questions and acknowledgement of understanding      Vitals: (Last set prior to Anesthesia Care Transfer)    CRNA VITALS  3/11/2019 1215 - 3/11/2019 1251      3/11/2019             Resp Rate (set):  10                Electronically Signed By: JAMMIE Zhang CRNA  March 11, 2019  12:51 PM

## 2019-03-11 NOTE — PLAN OF CARE
Discharge Planner PT   Patient plan for discharge: Home with assist from sister and , OP PT   Current status:     Eval complete, treatment indicated. Edu Pt role and POC. KI for OOB mobility 2/2 impaired RLE quad activation. BP stable with position change.     Pt engaged in supine exercises, AAROM SLR and SAQ due to quad weakness and impaired sensation. KI for OOB mobility     Supine > sit with CGA and cues for proper technique. Denied dizizness. STS X 1 with FWW, focus on pre gait activity in standing. PT on R side. KI on. Engaged in wt shift side to side, minor RLE buckling noted. Trialed standing marching, pt with inc RLE buckling, not safe to initiate gait. Pt engaged in side stepping towards HOB for proper positioning. Sit > supine with Trice x 1. Cues for midline position. Inc time for line management during session. CAPNO WNL on RA. Pt left supine with alarm on and needs in reach   Barriers to return to prior living situation: A x 1-2 for mobility, weakness, impaired balance, unable to safely initiate gait this PM   Recommendations for discharge: Home with assist and OP PT   Rationale for recommendations: Predict pt will progress to be safe to discharge to home and cont OP PT        Entered by: Lolis Zheng 03/11/2019 4:53 PM

## 2019-03-11 NOTE — OP NOTE
PATIENT NAME:  Yessy BAEZA [facility]  MEDICAL RECORD #: [id number]  PATIENT BIRTHDAY:  1955  DATE OF SURGERY: 3/11/2019    SURGEON:    Tate Cohn MD    1st ASSISTANT:  FLOR Vásquez OPA-C    PREOPERATIVE DIAGNOSIS:  Degenerative osteoarthritis right knee.    POSTOPERATIVE DIAGNOSIS:  Degenerative osteoarthritis right knee.    PROCEDURE: right total knee arthroplasty.    COMPONENTS: Smith [employer] Nephew - Size 5N CR Oxinium femoral component, size 4 fully stemmed tibial baseplate with 9 mm CR XLPE high flexion articular insert, and 32 mm patellar component.    ANESTHESIA: Spinal     INDICATIONS FOR PROCEDURE:  The patient was brought to the operating room for elective total knee replacement for advanced degenerative osteoarthritis.  The patient received IV antibiotics preoperatively.  These will be continued for 24 hours.  The patient also will receive anticoagulants  for postoperative thrombosis prophylaxis.  The patient understands the indications, alternatives, risks, benefits, and time involved for recovery and wishes to proceed.  The patient is consented for the procedure.      DESCRIPTION OF PROCEDURE:  The patient was brought to the operating room  and following suitable Spinal anesthesia, the right knee was prepped and draped in the usual manner.  Full timeout was carried out and the patient and proper extremity and operative site identified and confirmed by all members of the operative team.    We next exsanguinated the operative leg with a Mike bandage and the tourniquet was elevated to 350 mmHg on the thigh.    A 10 cm longitudinal incision was made anteriorly for the MIS technique.  Sharp dissection was carried down through the subcutaneous tissue.  A median parapatellar arthrotomy was carried out and the knee flexed to 90 degrees.    There was moderate wear in the medial compartment and severe wear in the patellofemoral  compartment and severe wear in the lateral compartment.    The Smith [employer] Nephew  instrumentation was used.  The femur was cut for a size 5N CR Oxinium implant.  The tibia was cut for a size 4 fully stemmed base plate.  The patella was cut for a 32 mm patellar button.    The trial components were removed from the knee.  The bone surfaces were prepared with jet lavage and careful drying technique.     The posterior capsule was injected for postoperative relief with 30 cc of saline, 30 cc of 0.2% Ropivacaine, and 15 mg of Toradol.       We then cemented the components with HD Simplex cement beginning with the tibial baseplate, followed by the femoral component, followed by the patellar component.    The knee was held in extension with the trial insert in place in the tibia until the cement was set.  Once the cement was set up, the trial component was removed.  We selected the 9 mm CR XLPE high flexion articular insert.  This insert was secured and the knee checked one final time for motion, stability, alignment and soft tissue balance, all of which were excellent.    The wound was irrigated throughout with antibiotic solution using jet lavage.  The tourniquet was deflated prior to wound closure and all bleeding controlled with electrocautery.  We then re-exsanguinated the leg and reinflated the tourniquet during wound closure.    The wound was closed over a medium Hemovac drain with spaced 0 Ethibond interrupted and V-Loc running suture in the parapatellar arthrotomy, 2-0 undyed Vicryl in subcutaneous tissue and skin staples in the skin.  A sterile soft dressing was applied.  The tourniquet deflated one final time.  The patient was taken to the PAR in satisfactory condition.  There were no known complications during the procedure.    A surgical assistant was medically necessary for this procedure for pre-op positioning as well as intra-op retraction and extremity support and positioning.  He was present for the entire procedure.      EMIR JAMES MD    CC  Emir James MD           [phone] Fax

## 2019-03-11 NOTE — ANESTHESIA POSTPROCEDURE EVALUATION
Patient: Yessy Kuhn    Procedure(s):  RIGHT TOTAL KNEE ARTHROPLASTY    Diagnosis:RIGHT KNEE DJD  Diagnosis Additional Information: No value filed.    Anesthesia Type:  Spinal    Note:  Anesthesia Post Evaluation    Patient location during evaluation: PACU  Patient participation: Able to fully participate in evaluation  Level of consciousness: awake and alert  Pain management: adequate  Airway patency: patent  Cardiovascular status: acceptable  Respiratory status: acceptable and unassisted  Hydration status: acceptable  PONV: none             Last vitals:  Vitals:    03/11/19 1251 03/11/19 1300 03/11/19 1310   BP:  121/61 112/62   Pulse:   61   Resp:  18 18   Temp: 36.9  C (98.4  F)     SpO2:  98% 99%         Electronically Signed By: Bobby Baird MD  March 11, 2019  1:25 PM

## 2019-03-11 NOTE — ANESTHESIA PROCEDURE NOTES
Peripheral nerve/Neuraxial procedure note : Adductor canal and Femoral  Pre-Procedure  Performed by Bobby Baird MD  Location: pre-op      Pre-Anesthestic Checklist: patient identified, IV checked, site marked, risks and benefits discussed, informed consent, monitors and equipment checked, pre-op evaluation, at physician/surgeon's request and post-op pain management    Timeout  Correct Patient: Yes   Correct Procedure: Yes   Correct Site: Yes   Correct Laterality: Yes   Correct Position: Yes   Site Marked: Yes   .   Procedure Documentation    .    Procedure:  right  Adductor canal and Femoral.  Local skin infiltrated with 3 mL of 1% lidocaine.     Ultrasound used to identify targeted nerve, plexus, or vascular marker and placed a needle adjacent to it., Ultrasound was used to visualize the spread of the anesthetic in close proximity to the above stated nerve. A permanent image is entered into the patient's record.  Patient Prep;mask, sterile gloves, chlorhexidine gluconate and isopropyl alcohol.  .  Needle: insulated, short bevel (Pajunk) Needle Gauge: 21.  Needle Length (millimeters) 100  Insertion Method: Single Shot.       Assessment/Narrative  Paresthesias: No.  Injection made incrementally with aspirations every 5 mL..  The placement was negative for: blood aspirated, painful injection and site bleeding.  Bolus given via needle..   Secured via.   Complications: none. Test dose of mL at. Test dose negative for signs of intravascular, subdural or intrathecal injection. Comments:  0.5% Bupivacaine with 1:400,000 epinephrine injected. Patient tolerated the procedure well.    The surgeon has given a verbal order transferring care of this patient to me for the performance of a regional analgesia block for post operative pain control. It is requested of me because I am uniquely trained and qualified to perform this block and the surgeon is neither trained nor qualified to perform this procedure.

## 2019-03-11 NOTE — PROGRESS NOTES
03/11/19 1644   Quick Adds   Type of Visit Initial PT Evaluation   Living Environment   Lives With spouse   Living Environment Comment Pt lives in 2 story home, will need to negotiate full flight of stairs. Sister will be staying wiht pt. Pt's sister is retired ortho PA   Self-Care   Usual Activity Tolerance excellent   Activity/Exercise/Self-Care Comment Pt lives active lifestyle. Strength training 2x/week. Passionate about fitness and wellness.    Functional Level Prior   Ambulation 0-->independent   Transferring 0-->independent   Toileting 0-->independent   Bathing 0-->independent   Communication 0-->understands/communicates without difficulty   Fall history within last six months no   Prior Functional Level Comment IND prior, difficulty negotiating stairs due to R knee pain, limited gtolerance to exercise due to R knee pain    General Information   Onset of Illness/Injury or Date of Surgery - Date 03/11/19   Referring Physician Tate Cohn MD   Pertinent History of Current Problem (include personal factors and/or comorbidities that impact the POC) POD # 0 R TKA   Precautions/Limitations fall precautions   Weight-Bearing Status - RLE weight-bearing as tolerated   Cognitive Status Examination   Orientation orientation to person, place and time   Pain Assessment   Patient Currently in Pain Yes, see Vital Sign flowsheet   Posture    Posture Forward head position   Range of Motion (ROM)   ROM Comment R knee 5-60* AROM supine   Strength   Strength Comments Unable to perform SLR on RLE, R quad weakness noted. LLE WFL   Bed Mobility   Bed Mobility Comments Supine > sit with CGA and HOB elevated   Transfer Skills   Transfer Comments STS with FWW and KI donned, min A x 1, R knee buckling    Gait   Gait Comments Not initiated 2/2 RLE numbness and  buckling with WB, no tsafe    Balance   Balance Comments Pt requires BUE support on FWW in standing    Sensory Examination   Sensory Perception Comments Pt  "reports RLE numbness   General Therapy Interventions   Planned Therapy Interventions balance training;bed mobility training;gait training;neuromuscular re-education;transfer training;ROM;strengthening   Clinical Impression   Criteria for Skilled Therapeutic Intervention yes, treatment indicated   PT Diagnosis Impaired functional mobility   Influenced by the following impairments Pain, weakness, balance, impaired sensation    Functional limitations due to impairments Impaired functional mobility    Clinical Presentation Stable/Uncomplicated   Clinical Presentation Rationale Good family support, IND prior    Clinical Decision Making (Complexity) Moderate complexity   Therapy Frequency` 2 times/day   Predicted Duration of Therapy Intervention (days/wks) 3 days   Anticipated Discharge Disposition Home with Outpatient Therapy   Risk & Benefits of therapy have been explained Yes   Patient, Family & other staff in agreement with plan of care Yes   [facility] AM-PAC  \"6 Clicks\" V.2 Basic Mobility Inpatient Short Form   1. Turning from your back to your side while in a flat bed without using bedrails? 3 - A Little   2. Moving from lying on your back to sitting on the side of a flat bed without using bedrails? 3 - A Little   3. Moving to and from a bed to a chair (including a wheelchair)? 3 - A Little   4. Standing up from a chair using your arms (e.g., wheelchair, or bedside chair)? 3 - A Little   5. To walk in hospital room? 2 - A Lot   6. Climbing 3-5 steps with a railing? 1 - Total   Basic Mobility Raw Score (Score out of 24.Lower scores equate to lower levels of function) 15   Total Evaluation Time   Total Evaluation Time (Minutes) 15     "

## 2019-03-12 ENCOUNTER — APPOINTMENT (OUTPATIENT)
Dept: PHYSICAL THERAPY | Facility: CLINIC | Age: 64
DRG: 470 | End: 2019-03-12
Attending: ORTHOPAEDIC SURGERY
Payer: COMMERCIAL

## 2019-03-12 ENCOUNTER — APPOINTMENT (OUTPATIENT)
Dept: OCCUPATIONAL THERAPY | Facility: CLINIC | Age: 64
DRG: 470 | End: 2019-03-12
Attending: ORTHOPAEDIC SURGERY
Payer: COMMERCIAL

## 2019-03-12 LAB
ANION GAP SERPL CALCULATED.3IONS-SCNC: 5 MMOL/L (ref 3–14)
BUN SERPL-MCNC: 10 MG/DL (ref 7–30)
CALCIUM SERPL-MCNC: 7.8 MG/DL (ref 8.5–10.1)
CHLORIDE SERPL-SCNC: 108 MMOL/L (ref 94–109)
CO2 SERPL-SCNC: 28 MMOL/L (ref 20–32)
CREAT SERPL-MCNC: 0.65 MG/DL (ref 0.52–1.04)
GFR SERPL CREATININE-BSD FRML MDRD: >90 ML/MIN/{1.73_M2}
GLUCOSE SERPL-MCNC: 101 MG/DL (ref 70–99)
HGB BLD-MCNC: 9.9 G/DL (ref 11.7–15.7)
PLATELET # BLD AUTO: 120 10E9/L (ref 150–450)
POTASSIUM SERPL-SCNC: 4.2 MMOL/L (ref 3.4–5.3)
SODIUM SERPL-SCNC: 141 MMOL/L (ref 133–144)

## 2019-03-12 PROCEDURE — 97110 THERAPEUTIC EXERCISES: CPT | Mod: GP

## 2019-03-12 PROCEDURE — 36415 COLL VENOUS BLD VENIPUNCTURE: CPT | Performed by: ORTHOPAEDIC SURGERY

## 2019-03-12 PROCEDURE — 97116 GAIT TRAINING THERAPY: CPT | Mod: GP

## 2019-03-12 PROCEDURE — 12000000 ZZH R&B MED SURG/OB

## 2019-03-12 PROCEDURE — 85049 AUTOMATED PLATELET COUNT: CPT | Performed by: ORTHOPAEDIC SURGERY

## 2019-03-12 PROCEDURE — 97165 OT EVAL LOW COMPLEX 30 MIN: CPT | Mod: GO

## 2019-03-12 PROCEDURE — 97530 THERAPEUTIC ACTIVITIES: CPT | Mod: GP

## 2019-03-12 PROCEDURE — 25000132 ZZH RX MED GY IP 250 OP 250 PS 637: Performed by: ORTHOPAEDIC SURGERY

## 2019-03-12 PROCEDURE — 80048 BASIC METABOLIC PNL TOTAL CA: CPT | Performed by: ORTHOPAEDIC SURGERY

## 2019-03-12 PROCEDURE — 85018 HEMOGLOBIN: CPT | Performed by: ORTHOPAEDIC SURGERY

## 2019-03-12 PROCEDURE — 25000128 H RX IP 250 OP 636: Performed by: ORTHOPAEDIC SURGERY

## 2019-03-12 PROCEDURE — 97535 SELF CARE MNGMENT TRAINING: CPT | Mod: GO

## 2019-03-12 RX ORDER — HYDROMORPHONE HYDROCHLORIDE 2 MG/1
2-4 TABLET ORAL EVERY 4 HOURS PRN
Status: DISCONTINUED | OUTPATIENT
Start: 2019-03-12 | End: 2019-03-13 | Stop reason: HOSPADM

## 2019-03-12 RX ADMIN — KETOROLAC TROMETHAMINE 30 MG: 30 INJECTION, SOLUTION INTRAMUSCULAR at 01:25

## 2019-03-12 RX ADMIN — HYDROMORPHONE HYDROCHLORIDE 4 MG: 2 TABLET ORAL at 17:31

## 2019-03-12 RX ADMIN — HYDROMORPHONE HYDROCHLORIDE 4 MG: 2 TABLET ORAL at 08:41

## 2019-03-12 RX ADMIN — ENOXAPARIN SODIUM 40 MG: 40 INJECTION SUBCUTANEOUS at 11:15

## 2019-03-12 RX ADMIN — HYDROMORPHONE HYDROCHLORIDE 4 MG: 2 TABLET ORAL at 21:37

## 2019-03-12 RX ADMIN — HYDROXYZINE HYDROCHLORIDE 50 MG: 25 TABLET, FILM COATED ORAL at 13:52

## 2019-03-12 RX ADMIN — ACETAMINOPHEN 975 MG: 325 TABLET, FILM COATED ORAL at 01:08

## 2019-03-12 RX ADMIN — CEFAZOLIN 1 G: 1 INJECTION, POWDER, FOR SOLUTION INTRAMUSCULAR; INTRAVENOUS at 05:19

## 2019-03-12 RX ADMIN — SENNOSIDES AND DOCUSATE SODIUM 2 TABLET: 8.6; 5 TABLET ORAL at 08:41

## 2019-03-12 RX ADMIN — OXYCODONE HYDROCHLORIDE 10 MG: 5 TABLET ORAL at 01:09

## 2019-03-12 RX ADMIN — HYDROXYZINE HYDROCHLORIDE 50 MG: 25 TABLET, FILM COATED ORAL at 01:08

## 2019-03-12 RX ADMIN — HYDROMORPHONE HYDROCHLORIDE 4 MG: 2 TABLET ORAL at 13:16

## 2019-03-12 RX ADMIN — ACETAMINOPHEN 975 MG: 325 TABLET, FILM COATED ORAL at 08:41

## 2019-03-12 RX ADMIN — ACETAMINOPHEN 975 MG: 325 TABLET, FILM COATED ORAL at 16:36

## 2019-03-12 RX ADMIN — HYDROXYZINE HYDROCHLORIDE 25 MG: 25 TABLET, FILM COATED ORAL at 17:57

## 2019-03-12 RX ADMIN — OXYCODONE HYDROCHLORIDE 10 MG: 5 TABLET ORAL at 05:24

## 2019-03-12 RX ADMIN — SENNOSIDES AND DOCUSATE SODIUM 2 TABLET: 8.6; 5 TABLET ORAL at 21:37

## 2019-03-12 RX ADMIN — KETOROLAC TROMETHAMINE 30 MG: 30 INJECTION, SOLUTION INTRAMUSCULAR at 08:41

## 2019-03-12 ASSESSMENT — ACTIVITIES OF DAILY LIVING (ADL)
ADLS_ACUITY_SCORE: 11
PREVIOUS_RESPONSIBILITIES: MEAL PREP;HOUSEKEEPING;LAUNDRY;SHOPPING;YARDWORK;MEDICATION MANAGEMENT
ADLS_ACUITY_SCORE: 11

## 2019-03-12 NOTE — PLAN OF CARE
PRN Dilaudid and Atarax with scheduled Tylenol given for pain control this shift, up with one assist and walker, voiding in bathroom, and patient is progressing toward plan of care.

## 2019-03-12 NOTE — PLAN OF CARE
Came from PACU around 14:30.  VSS, RA.  CMS intact, ex slight numbness in BLE.  Pain managed with scheduled toradol and oxy.  Voiding adequate amount in bedpan/BSC.  Dangled, unable to walk due to numbness from spinal.   HemoVac patent and intact.  Knee immobilizer in place.  Capno stable.  Continue to monitor.

## 2019-03-12 NOTE — PROVIDER NOTIFICATION
MD Notification    Notified Person: MD    Notified Person Name:Dr. Larsen    Notification Date/Time:3/11/19 2220    Notification Interaction:phone    Purpose of Notification:Pt states oxy and toradol is not helping with pain management    Orders Received:atarax 50 mg PO q4hr PRN    Comments:

## 2019-03-12 NOTE — PLAN OF CARE
Discharge Planner PT   Patient plan for discharge: Home with assist and OP PT   Current status:   Pt engaged in supine TKA exercises, R knee ROM 10-60* this day. AAROM SLR, SAQ, heel slides. Quad weakness and dec ms activation     KI donned for OOB mobility, inc time for rm set up for gait. SUpine > Sit CGA. Pt sat EOB for several m inutes, VSS. STS x 2 with FWW and CGA from EOB. Engaged in wt shifting prior to gait, no RLE buckling this day. Pt ambualted 130' with FWW and close CGA, cues for improved mechanics, step to gait this AM.   End of session pt seated in chair alarm on   Barriers to return to prior living situation: A x 1, stairs  Recommendations for discharge: Predict [facility] and OP PT   Rationale for recommendations: Pt progressing well, predict will be safe to discharge to home pending stair trial and progressing transfers and gait          Entered by: Lolis Zheng 03/12/2019 12:20 PM

## 2019-03-12 NOTE — PROGRESS NOTES
[facility] Orthopedic Post-Op / Progress Note  Yessy Kuhn is a 63 year old female    Today's Date:3/12/2019  Admission Date: 3/11/2019  POD # 1         Interval History:   doing well  Wants change of med from Oxycodone to oral dilaudid            Physical Exam:   All vitals have been reviewed  Temperatures:  Current - Temp: 99  F (37.2  C); Max - Temp  Av.3  F (36.8  C)  Min: 97.7  F (36.5  C)  Max: 99  F (37.2  C)  Pulse range: Pulse  Av.1  Min: 58  Max: 78  Blood pressure range: Systolic (24hrs), Av , Min:95 , Max:156   ; Diastolic (24hrs), Av, Min:44, Max:73      Intake/Output Summary (Last 24 hours) at 3/12/2019 0737  Last data filed at 3/12/2019 0600  Gross per 24 hour   Intake 2625 ml   Output 1800 ml   Net 825 ml       Dressing dry and intact.          Data:   All laboratory data related to this surgery reviewed      Lab Results   Component Value Date     2019    POTASSIUM 4.2 2019    CHLORIDE 108 2019    CO2 28 2019     (H) 2019     Lab Results   Component Value Date    HGB 9.9 (L) 2019    HGB 12.8 2019    HGB 13.3 2019     Platelet Count (10e9/L)   Date Value   2019 120 (L)   2019 149 (L)   2019 179       All imaging studies related to this surgery reviewed  Hardware is intact and in good approximation         Assessment and Plan:    Assessment:  Doing well.  No immediate surgical complications identified.  No excessive bleeding    Plan:  Continue physical therapy  Pain control measures  Discharge plan: Home tomorrow    Tate Cohn MD

## 2019-03-12 NOTE — PROGRESS NOTES
03/12/19 1511   Quick Adds   Type of Visit Initial Occupational Therapy Evaluation   Living Environment   Lives With spouse   Living Arrangements house   Transportation Anticipated family or friend will provide   Living Environment Comment spouse can A as needed. Sister will be there who is retired ortho PA. walk in shower    Self-Care   Usual Activity Tolerance excellent   Current Activity Tolerance moderate   Equipment Currently Used at Home walker, standard   Functional Level   Ambulation 0-->independent   Transferring 0-->independent   Toileting 0-->independent   Bathing 0-->independent   Dressing 0-->independent   Eating 0-->independent   Communication 0-->understands/communicates without difficulty   Swallowing 0-->swallows foods/liquids without difficulty   Cognition 0 - no cognition issues reported   Fall history within last six months no   Which of the above functional risks had a recent onset or change? none   Prior Functional Level Comment IND prior    General Information   Onset of Illness/Injury or Date of Surgery - Date 03/11/19   Referring Physician Lalit   Patient/Family Goals Statement return home   Additional Occupational Profile Info/Pertinent History of Current Problem R TKA    Precautions/Limitations fall precautions   Weight-Bearing Status - RLE weight-bearing as tolerated   Cognitive Status Examination   Orientation orientation to person, place and time   Level of Consciousness alert   Follows Commands (Cognition) WNL   Memory intact   Attention No deficits were identified   Visual Perception   Visual Perception Wears glasses   Sensory Examination   Sensory Quick Adds No deficits were identified   Pain Assessment   Patient Currently in Pain Yes, see Vital Sign flowsheet  (3/10)   Range of Motion (ROM)   ROM Quick Adds No deficits were identified   Strength   Manual Muscle Testing Quick Adds No deficits were identified   Mobility   Bed Mobility Bed mobility skill: Sit to supine;Bed mobility  skill: Supine to sit   Bed Mobility Skill: Sit to Supine   Level of Welsh: Sit/Supine independent   Bed Mobility Skill: Supine to Sit   Level of Welsh: Supine/Sit stand-by assist   Transfer Skill: Sit to Stand   Level of Welsh: Sit/Stand stand-by assist   Transfer Skill: Sit to Stand weight-bearing as tolerated   Assistive Device for Transfer: Sit/Stand standard walker   Transfer Skill: Toilet Transfer   Level of Welsh: Toilet stand-by assist   Weight-Bearing Restrictions: Toilet weight-bearing as tolerated   Assistive Device standard walker   Upper Body Dressing   Level of Welsh: Dress Upper Body independent   Lower Body Dressing   Level of Welsh: Dress Lower Body moderate assist (50% patients effort)   Toileting   Level of Welsh: Toilet independent   Grooming   Level of Welsh: Grooming independent   Eating/Self Feeding   Level of Welsh: Eating independent   Instrumental Activities of Daily Living (IADL)   Previous Responsibilities meal prep;housekeeping;laundry;shopping;yardwork;medication management   IADL Comments spouse and sister will be home to A at d/c   Activities of Daily Living Analysis   Impairments Contributing to Impaired Activities of Daily Living pain   General Therapy Interventions   Planned Therapy Interventions ADL retraining;transfer training   Clinical Impression   Criteria for Skilled Therapeutic Interventions Met yes, treatment indicated   OT Diagnosis dec IND with ADL's and transfers   Influenced by the following impairments pain   Assessment of Occupational Performance 1-3 Performance Deficits   Identified Performance Deficits LE dressing, toilet transfer   Clinical Decision Making (Complexity) Low complexity   Therapy Frequency (1 tx only)   Predicted Duration of Therapy Intervention (days/wks) 1 tx only   Anticipated Discharge Disposition Home with Assist   Risks and Benefits of Treatment have been explained. Yes   Patient,  "Family & other staff in agreement with plan of care Yes   [facility] AM-PAC TM \"6 Clicks\"   2016, Trustees of [facility], under license to [employer].  All rights reserved.   6 Clicks Short Forms Daily Activity Inpatient Short Form   [facility] AM-PAC  \"6 Clicks\" Daily Activity Inpatient Short Form   1. Putting on and taking off regular lower body clothing? 3 - A Little   2. Bathing (including washing, rinsing, drying)? 3 - A Little   3. Toileting, which includes using toilet, bedpan or urinal? 4 - None   4. Putting on and taking off regular upper body clothing? 4 - None   5. Taking care of personal grooming such as brushing teeth? 4 - None   6. Eating meals? 4 - None   Daily Activity Raw Score (Score out of 24.Lower scores equate to lower levels of function) 22   Total Evaluation Time   Total Evaluation Time (Minutes) 8     "

## 2019-03-12 NOTE — PLAN OF CARE
Pt is AOx4, CMS intact, VSS, dressing CDI, pain managed with 10mg oxy and atarax. Voiding, knee immobilizer on overnight. Pt rested well. Hemovac wnl. Progressing per plan of care.

## 2019-03-12 NOTE — PROGRESS NOTES
"SPIRITUAL HEALTH SERVICES Progress Note  [facility]    Visited per request.     Pt reflected on her temitope and relationship with God and is doing well. Pt and her spouse spiritually prepared themselves for this hospitalization. Pt shared that her spouse had open heart surgery a year ago and this was a significant moment. Pt shared it is best to trust God, \"It is his will not mine.\" Pt and Pt's family have a devoted temitope sharing her kids are acti e in their temitope communites and attending [employer] and [employer] for schooling. Pt requested to have communion.      provided a kind presence, listening, support, hospitality, conversation. SH will put in a request for communion.        SH is available if further needs arise.     Clifford Castillo  Chaplain Resident  "

## 2019-03-12 NOTE — PLAN OF CARE
Discharge Planner OT   Patient plan for discharge: home with spouse A   Current status: order received, chart reviewed, eval and tx completed. Pt seen POD 1 s/p R TKA. Pt lives in split level home with spouse and reports IND with ADL/IADL's prior.   SBA for supine > sit with cues for technique. OT demonstrated and instructed on AE for LE dressing. Pt able to don/doff socks and pants with SBA and AE. Pt reports spouse will A with socks. SBA for sit <> stand with FWW. SBA for toilet transfer. IND with toileting and grooming at sink. Pt demos good safety judgement and has good family support at home. No further OT needs identified. Dc acute OT.   Barriers to return to prior living situation: none   Recommendations for discharge: home with family A for LE dressing (as needed), bathing, cooking, cleaning, laundry, transportation  Rationale for recommendations: pt reports she has good family support at discharge. Pt''s sister is coming to stay with her and is retired ortho PA. Pt demos good safety judgement with mobility and ADL's. Pt has appropriate AE at home for safety. No further OT concerns as pt receptive to all education. discharge acute OT.     Occupational Therapy Discharge Summary    Reason for therapy discharge:    All goals and outcomes met, no further needs identified.    Progress towards therapy goal(s). See goals on Care Plan in [facility] electronic health record for goal details.  Goals met    Therapy recommendation(s):    No further therapy is recommended.           Entered by: Deedee Treviño 03/12/2019 3:40 PM

## 2019-03-13 ENCOUNTER — APPOINTMENT (OUTPATIENT)
Dept: PHYSICAL THERAPY | Facility: CLINIC | Age: 64
DRG: 470 | End: 2019-03-13
Attending: ORTHOPAEDIC SURGERY
Payer: COMMERCIAL

## 2019-03-13 ENCOUNTER — DOCUMENTATION ONLY (OUTPATIENT)
Dept: OTHER | Facility: CLINIC | Age: 64
End: 2019-03-13

## 2019-03-13 VITALS
OXYGEN SATURATION: 99 % | DIASTOLIC BLOOD PRESSURE: 53 MMHG | HEIGHT: 65 IN | SYSTOLIC BLOOD PRESSURE: 117 MMHG | HEART RATE: 68 BPM | RESPIRATION RATE: 16 BRPM | WEIGHT: 163 LBS | TEMPERATURE: 97.5 F | BODY MASS INDEX: 27.16 KG/M2

## 2019-03-13 LAB
HGB BLD-MCNC: 9.5 G/DL (ref 11.7–15.7)
PLATELET # BLD AUTO: 117 10E9/L (ref 150–450)

## 2019-03-13 PROCEDURE — 97110 THERAPEUTIC EXERCISES: CPT | Mod: GP

## 2019-03-13 PROCEDURE — 97530 THERAPEUTIC ACTIVITIES: CPT | Mod: GP

## 2019-03-13 PROCEDURE — 25000128 H RX IP 250 OP 636: Performed by: ORTHOPAEDIC SURGERY

## 2019-03-13 PROCEDURE — 97116 GAIT TRAINING THERAPY: CPT | Mod: GP

## 2019-03-13 PROCEDURE — 85018 HEMOGLOBIN: CPT | Performed by: ORTHOPAEDIC SURGERY

## 2019-03-13 PROCEDURE — 25000132 ZZH RX MED GY IP 250 OP 250 PS 637: Performed by: ORTHOPAEDIC SURGERY

## 2019-03-13 PROCEDURE — 36415 COLL VENOUS BLD VENIPUNCTURE: CPT | Performed by: ORTHOPAEDIC SURGERY

## 2019-03-13 PROCEDURE — 85049 AUTOMATED PLATELET COUNT: CPT | Performed by: ORTHOPAEDIC SURGERY

## 2019-03-13 RX ORDER — AMOXICILLIN 250 MG
1 CAPSULE ORAL 2 TIMES DAILY
Qty: 50 TABLET | Refills: 0 | Status: SHIPPED | OUTPATIENT
Start: 2019-03-13 | End: 2019-07-08

## 2019-03-13 RX ORDER — HYDROMORPHONE HYDROCHLORIDE 2 MG/1
2-4 TABLET ORAL EVERY 4 HOURS PRN
Qty: 40 TABLET | Refills: 0 | Status: SHIPPED | OUTPATIENT
Start: 2019-03-13 | End: 2019-07-08

## 2019-03-13 RX ADMIN — ACETAMINOPHEN 975 MG: 325 TABLET, FILM COATED ORAL at 08:28

## 2019-03-13 RX ADMIN — HYDROXYZINE HYDROCHLORIDE 25 MG: 25 TABLET, FILM COATED ORAL at 05:35

## 2019-03-13 RX ADMIN — HYDROMORPHONE HYDROCHLORIDE 4 MG: 2 TABLET ORAL at 05:10

## 2019-03-13 RX ADMIN — HYDROMORPHONE HYDROCHLORIDE 4 MG: 2 TABLET ORAL at 01:18

## 2019-03-13 RX ADMIN — SENNOSIDES AND DOCUSATE SODIUM 2 TABLET: 8.6; 5 TABLET ORAL at 08:28

## 2019-03-13 RX ADMIN — HYDROMORPHONE HYDROCHLORIDE 4 MG: 2 TABLET ORAL at 09:25

## 2019-03-13 RX ADMIN — ACETAMINOPHEN 975 MG: 325 TABLET, FILM COATED ORAL at 01:18

## 2019-03-13 RX ADMIN — HYDROMORPHONE HYDROCHLORIDE 4 MG: 2 TABLET ORAL at 13:44

## 2019-03-13 RX ADMIN — ENOXAPARIN SODIUM 40 MG: 40 INJECTION SUBCUTANEOUS at 11:48

## 2019-03-13 ASSESSMENT — ACTIVITIES OF DAILY LIVING (ADL)
ADLS_ACUITY_SCORE: 11

## 2019-03-13 NOTE — PROGRESS NOTES
A/O, VSS on RA. CMS intact Tmax 99.5, gave scheduled tylenol. Pain controlled with PO. Dressing CDI. Immobilizer on. Up to BR with SBA, GB, Walker.

## 2019-03-13 NOTE — DISCHARGE SUMMARY
DISCHARGE SUMMARY    NAME:  Yessy Kuhn  AGE:  63 year old  YOB: 1955  MRN#:  [id number]    Yessy Kuhn was admitted for elective total knee arthroplasty.  The surgery was performed on 3/11/2019.  The postoperative course is documented in the medical record.  There were no complications. The patient was felt ready for discharge home with post-discharge physical therapy and outpatient visit prearranged.     Lab Results   Component Value Date    HGB 9.5 (L) 03/13/2019    HGB 9.9 (L) 03/12/2019    HGB 12.8 03/11/2019       The patient received 0 units transfusion.      FINAL DISCHARGE DIAGNOSIS:  Degenerative osteoarthritis knee.               Acute blood loss anemia     SURGICAL PROCEDURE THIS ADMISSION:  Right total knee arthroplasty.         TATE JAMES MD      CC: Fax [phone]         Tate James MD

## 2019-03-13 NOTE — DISCHARGE INSTRUCTIONS
DISCHARGE INSTRUCTIONS AFTER YOUR TOTAL KNEE REPLACEMENT     EMIR JAMES MD       Instructions to care for your wound at home:   Change the dressing daily.  Inspect your incision at the time of dressing change for increased redness, tenderness, swelling, or drainage along the incision line.  Some bruising or discoloration is usually present, but call my office for any changes in appearance that concern you.  Also call if you develop a fever above 101 degrees.     You may shower directly over the wound beginning 4 days after your surgery, but do not submerge the wound under water until after your post operative visit when the sutures are removed and the wound is completely sealed without drainage.    Activities:  Physical activity may be resumed gradually according to your comfort level. You may bear weight on your operative leg as tolerated with your crutches or walker as instructed by your therapist.  Follow your home exercise program.  Ice your knee after exercising.        Wear your knee immobilizer at night only until your office visit in 2 weeks to maintain full knee extension.  Do not use the immobilizer during the day unless otherwise instructed.      Wear the anti-embolism stockings day and night until seen in the office for your post operative visit. Remove them twice daily for one hour at a time. Keep the compression stockings flat on your leg.  Do not allow them to roll up or crease your skin.  Call if you develop calf pain.     Outpatient Physical Therapy and home exercises:  Outpatient physical therapy visits are required following discharge from the hospital. The referral for these outpatient therapy visits is routinely given to you at the time of your surgical scheduling. You should have already scheduled your therapy sessions in advance.  If you have not done so, please immediately call the therapy site of your choice to schedule the physical therapy regimen that has been prescribed for you.  You  may discontinue the crutches or walker per the therapist's recommendation.      Medications:  New medications for you on discharge will include a pain medication, a stool softener while on the narcotic pain medication, and a blood thinner.  Detailed instructions will come with those medications.  You will also receive instructions on when to resume your home medications.     If you routinely take Aspirin 81 mg, hold the Aspirin while taking the formal blood thinner medication. Then take Aspirin 325 mg (1 tablet) daily for 4 weeks.  Then resume your Aspirin 81 mg daily if that is your routine.        Antibiotic coverage will be needed before any type of dental procedure.  This is a life long recommendation.  You should notify your dentist of your total knee surgery and call your dentist or our office one week before a dental appointment for antibiotics.        Clinic follow-up appointment:  Your clinic follow-up appointment has been prearranged.  Call [phone] with any questions.    Tate Cohn MD

## 2019-03-13 NOTE — PLAN OF CARE
Pt A/Ox4. VSS. Up SBA w/ walker. Reports pain 4/10 using PO dilaudid with relief. Regular diet tolerated. CMS intact. Voiding adequately. Dressing changed. Discharged to home with sister at 1500 via wheelchair. AVS and cost of medication reviewed with pt at discharge. Denies pain at discharge.

## 2019-03-13 NOTE — PLAN OF CARE
A/OX4. BP soft, Tmax 99,other VSS. R TKA, Hemovac removed, new drsg applied. No s/sx of infection noted. Jerry reg diet well. Pain managed w/ samantha Tylenol and PRN dilaudid and Atarax. A1+gb+walker.

## 2019-03-13 NOTE — PLAN OF CARE
Discharge Planner PT   Patient plan for discharge: Home with assist and OP PT   Current status: Pt transfered sit to/from stand with CGA.. Sit to supine with Trice of R LE. Pt sat of EOB x2 minutes before ambulation. Pt slow at transfers due to increased pain. Pt needed CGA for handwashing at sink for balance.  Gait training 125' with KI donned and FWW with CGA, w/c follow. Pt had a slow pace with a step to pattern. Pt took multiple standing breaks due to increased fatigue. Pt performed 3 steps x1 with CGA and bilateral rails.  Pt left sitting up in  w/c with alarm on and needs in reach. R knee AAROM: 9-64 degrees.  Barriers to return to prior living situation: A x 1, stairs  Recommendations for discharge: Predict [facility] and OP PT per plan established by the PT.  Rationale for recommendations: Pt progressing well, predict will be safe to discharge to home pending stair trial and progressing transfers and gait               Entered by: Onelia Harden 03/13/2019 9:28 AM

## 2019-03-13 NOTE — PROGRESS NOTES
[facility] Orthopedic Post-Op / Progress Note  Yessy Kuhn is a 63 year old female    Today's Date:3/13/2019  Admission Date: 3/11/2019  POD # 2         Interval History:   doing well  Home today            Physical Exam:   All vitals have been reviewed  Temperatures:  Current - Temp: 97.5  F (36.4  C); Max - Temp  Av.8  F (37.1  C)  Min: 97.5  F (36.4  C)  Max: 99.5  F (37.5  C)  Pulse range: Pulse  Av  Min: 61  Max: 68  Blood pressure range: Systolic (24hrs), Av , Min:96 , Max:117   ; Diastolic (24hrs), Av, Min:48, Max:58      Intake/Output Summary (Last 24 hours) at 3/13/2019 1036  Last data filed at 3/13/2019 0300  Gross per 24 hour   Intake 680 ml   Output 5 ml   Net 675 ml       Wound clean and dry with minimal or no drainage.  Surrounding skin with minimal erythema.          Data:   All laboratory data related to this surgery reviewed      Lab Results   Component Value Date     2019    POTASSIUM 4.2 2019    CHLORIDE 108 2019    CO2 28 2019     (H) 2019     Lab Results   Component Value Date    HGB 9.5 (L) 2019    HGB 9.9 (L) 2019    HGB 12.8 2019     Platelet Count (10e9/L)   Date Value   2019 117 (L)   2019 120 (L)   2019 149 (L)       All imaging studies related to this surgery reviewed         Assessment and Plan:    Assessment:  Doing well.  Pain well-controlled.  Tolerating physical therapy and rehabilitation well.    Plan:  Discharge plan: Home today    Tate Cohn MD

## 2019-03-14 ENCOUNTER — TELEPHONE (OUTPATIENT)
Dept: FAMILY MEDICINE | Facility: CLINIC | Age: 64
End: 2019-03-14

## 2019-03-14 NOTE — TELEPHONE ENCOUNTER
"ED/Discharge Protocol    \"Hi, my name is Adriana Burns, a registered nurse, and I am calling on behalf of MELLISSA Wright's office at [place].  I am calling to follow up and see how things are going for you after your recent visit.\"    \"I see that you were in the (ER/UC/IP) on 3/11/19.    How are you doing now that you are home?\" Patient stated that she is Doing well     Is patient experiencing symptoms that may require a hospital visit?  No     Discharge Instructions    \"Let's review your discharge instructions.  What is/are the follow-up recommendations?  Pt. Response:   To schedule her post operative appointment which has been done. She has also already scheduled her PT. Patient stated that her sister is an RN and was there at discharge so she went over all her medications with a list for her.   Change the dressing daily. Inspect your incision at the time of dressing change for increased redness, tenderness, swelling, or drainage along the incision line.     You may shower directly over the wound beginning 4 days after your surgery, but do not submerge the wound under water until after your post operative visit when the sutures are removed and the wound is completely sealed without drainage.     Physical activity may be resumed gradually according to your comfort level. You may bear weight on your operative leg as tolerated with your crutches or walker as instructed by your therapist. Follow your home exercise program. Ice your knee after exercising.   Wear your knee immobilizer at night only until your office visit in 2 weeks to maintain full knee extension. Do not use the immobilizer during the day unless otherwise instructed.   Wear the anti-embolism stockings day and night until seen in the office for your post operative visit. Remove them twice daily for one hour at a time. Keep the compression stockings flat on your leg. Do not allow them to roll up or crease your skin. Call if you develop calf pain. " "    \"Were you instructed to make a follow-up appointment?\"  Pt. Response: Yes.  Has appointment been made?   Yes  But not told to follow up here just has a post operative appointment.     \"When you see the provider, I would recommend that you bring your discharge instructions with you.    Medications    \"How many new medications are you on since your hospitalization/ED visit?\"    2 or more -   \"How many of your current medicines changed (dose, timing, name, etc.) while you were in the hospital/ED visit?\"   0-1  \"Do you have questions about your medications?\"   No  \"Were you newly diagnosed with heart failure, COPD, diabetes or did you have a heart attack?\"   No  For patients on insulin: \"Did you start on insulin in the hospital or did you have your insulin dose changed?\"   No    Medication reconciliation completed? Yes    Was MTM referral placed (*Make sure to put transitions as reason for referral)?   No    Call Summary    \"Do you have any questions or concerns about your condition or care plan at the moment?\"    No      Patient was in ER once in the past year (assess appropriateness of ER visits.)      \"If you have questions or things don't continue to improve, we encourage you contact us through the main clinic number,  [phone].  Even if the clinic is not open, triage nurses are available 24/7 to help you.     We would like you to know that our clinic has extended hours (provide information).  We also have urgent care (provide details on closest location and hours/contact info)\"      \"Thank you for your time and take care!\"    Adriana MARTINEZN, RN   [facility]     "

## 2019-03-14 NOTE — TELEPHONE ENCOUNTER
Patient was discharged from [facility] on 03/13/2019 after being treated for Right Knee Djd, S/P Total Knee Arthroplasty. Triage please follow up with patient.        .Emiliana FAITH    [facility] [place]

## 2019-07-08 ENCOUNTER — OFFICE VISIT (OUTPATIENT)
Dept: FAMILY MEDICINE | Facility: CLINIC | Age: 64
End: 2019-07-08
Payer: COMMERCIAL

## 2019-07-08 VITALS
HEART RATE: 72 BPM | BODY MASS INDEX: 26.79 KG/M2 | TEMPERATURE: 97.7 F | DIASTOLIC BLOOD PRESSURE: 64 MMHG | WEIGHT: 161 LBS | SYSTOLIC BLOOD PRESSURE: 130 MMHG

## 2019-07-08 DIAGNOSIS — Z01.818 PREOP GENERAL PHYSICAL EXAM: Primary | ICD-10-CM

## 2019-07-08 DIAGNOSIS — M75.121 COMPLETE TEAR OF RIGHT ROTATOR CUFF, UNSPECIFIED WHETHER TRAUMATIC: ICD-10-CM

## 2019-07-08 LAB
ERYTHROCYTE [DISTWIDTH] IN BLOOD BY AUTOMATED COUNT: 12.7 % (ref 10–15)
HCT VFR BLD AUTO: 39.3 % (ref 35–47)
HGB BLD-MCNC: 12.7 G/DL (ref 11.7–15.7)
MCH RBC QN AUTO: 33.4 PG (ref 26.5–33)
MCHC RBC AUTO-ENTMCNC: 32.3 G/DL (ref 31.5–36.5)
MCV RBC AUTO: 103 FL (ref 78–100)
PLATELET # BLD AUTO: 137 10E9/L (ref 150–450)
RBC # BLD AUTO: 3.8 10E12/L (ref 3.8–5.2)
WBC # BLD AUTO: 3 10E9/L (ref 4–11)

## 2019-07-08 PROCEDURE — 85027 COMPLETE CBC AUTOMATED: CPT | Performed by: NURSE PRACTITIONER

## 2019-07-08 PROCEDURE — 99214 OFFICE O/P EST MOD 30 MIN: CPT | Performed by: NURSE PRACTITIONER

## 2019-07-08 PROCEDURE — 36415 COLL VENOUS BLD VENIPUNCTURE: CPT | Performed by: NURSE PRACTITIONER

## 2019-07-08 NOTE — PROGRESS NOTES
[facility] [place]  [address] [address]  [phone]  Dept: [phone]    PRE-OP EVALUATION:  Today's date: 2019    **PREOP FAXED** Kriss Sanchez CMA     Yessy Kuhn (: 1955) presents for pre-operative evaluation assessment as requested by Dr. Chan.  She requires evaluation and anesthesia risk assessment prior to undergoing surgery/procedure for treatment of rotator cuff tear.    Proposed Surgery/ Procedure: right shoulder rotator cuff repair   Date of Surgery/ Procedure: 19  Time of Surgery/ Procedure: [facility]  Hospital/Surgical Facility: [facility] surgery center   Fax number for surgical facility: [phone]  Primary Physician: [facility]  Type of Anesthesia Anticipated: General and nerve block     Patient has a Health Care Directive or Living Will:  YES     1. NO - Do you have a history of heart attack, stroke, stent, bypass or surgery on an artery in the head, neck, heart or legs?   2. NO - Do you ever have any pain or discomfort in your chest?  3. NO - Do you have a history of  Heart Failure?  4. NO - Are you troubled by shortness of breath when: walking on the level, up a slight hill or at night?  5. NO - Do you currently have a cold, bronchitis or other respiratory infection?  6. NO - Do you have a cough, shortness of breath or wheezing?  7. NO - Do you sometimes get pains in the calves of your legs when you walk?  8. NO - Do you or anyone in your family have previous history of blood clots?  9. NO - Do you or does anyone in your family have a serious bleeding problem such as prolonged bleeding following surgeries or cuts?  10. NO - Have you ever had problems with anemia or been told to take iron pills?  11. NO - Have you had any abnormal blood loss such as black, tarry or bloody stools, or abnormal vaginal bleeding?  12. NO - Have you ever had a blood transfusion?  13. NO - Have you or any of your relatives ever had problems  with anesthesia?  14. NO - Do you have sleep apnea, excessive snoring or daytime drowsiness?  15. NO - Do you have any prosthetic heart valves?  16. YES - Do you have prosthetic joints? Right knee  17. NO - Is there any chance that you may be pregnant?      HPI:     HPI related to upcoming procedure: Yessy tore her right rotator cuff while recuperating from right knee replacement surgery.       See problem list for active medical problems.  Problems all longstanding and stable, except as noted/documented.  See ROS for pertinent symptoms related to these conditions.      MEDICAL HISTORY:     Patient Active Problem List    Diagnosis Date Noted     S/P total knee arthroplasty 03/11/2019     Priority: Medium     Obesity, Class II, BMI 35-39.9 04/18/2016     Priority: Medium     CARDIOVASCULAR SCREENING; LDL GOAL LESS THAN 160 10/31/2010     Priority: Medium     Herniated cervical intervertebral disc 07/16/2008     Priority: Medium     Mitral valve disorder      Priority: Medium     prolapse  Problem list name updated by automated process. Provider to review        Past Medical History:   Diagnosis Date     Irritable bowel syndrome     constipation     Mitral valve prolapse      PONV (postoperative nausea and vomiting)      Presence of intrauterine contraceptive device   1997    removed 2014     Past Surgical History:   Procedure Laterality Date     ARTHROPLASTY KNEE Right 3/11/2019    Procedure: RIGHT TOTAL KNEE ARTHROPLASTY;  Surgeon: Tate Cohn MD;  Location:  OR     CHOLECYSTECTOMY, OPEN  1991     Current Outpatient Medications   Medication Sig Dispense Refill     acetaminophen (TYLENOL) 325 MG tablet Take 650 mg by mouth 2 times daily as needed for mild pain       melatonin 5 MG tablet Take 10 mg by mouth nightly as needed for sleep (2 x 5mg = 10mg)       Nutritional Supplements (NUTRITIONAL SUPPLEMENT PO) Take 2 capsules by mouth every morning Product contains Calcium 756mg                                Vitamin D 1,600 unit(s)                               Magnesium 386mg                               Vitamin K 100mcg                               Vitamin C 50mg       HYDROmorphone (DILAUDID) 2 MG tablet Take 1-2 tablets (2-4 mg) by mouth every 4 hours as needed for moderate to severe pain (Patient not taking: Reported on 7/8/2019) 40 tablet 0     order for DME Equipment being ordered: Walker Wheels () and Walker ()  Treatment Diagnosis: Impaired gait (Patient not taking: Reported on 7/8/2019) 1 each 0     senna-docusate (SENOKOT-S/PERICOLACE) 8.6-50 MG tablet Take 1 tablet by mouth 2 times daily (Patient not taking: Reported on 7/8/2019) 50 tablet 0     OTC products: None, except as noted above. Stopped ibuprofen two weeks ago.     Allergies   Allergen Reactions     Antihistamine [Alkylamines]      Emotional reactions      Latex Allergy: NO    Social History     Tobacco Use     Smoking status: Never Smoker     Smokeless tobacco: Never Used   Substance Use Topics     Alcohol use: No     Alcohol/week: 0.0 oz     History   Drug Use No       REVIEW OF SYSTEMS:   CONSTITUTIONAL: NEGATIVE for fever, chills, change in weight  INTEGUMENTARY/SKIN: NEGATIVE for worrisome rashes, moles or lesions  EYES: NEGATIVE for vision changes or irritation  ENT/MOUTH: NEGATIVE for ear, mouth and throat problems  RESP: NEGATIVE for significant cough or SOB  BREAST: NEGATIVE for masses, tenderness or discharge  CV: NEGATIVE for chest pain, palpitations or peripheral edema  GI: NEGATIVE for nausea, abdominal pain, heartburn, or change in bowel habits  : NEGATIVE for frequency, dysuria, or hematuria  MUSCULOSKELETAL: See HPI regarding right shoulder pain.  NEURO: NEGATIVE for weakness, dizziness or paresthesias  ENDOCRINE: NEGATIVE for temperature intolerance, skin/hair changes  HEME: NEGATIVE for bleeding problems  PSYCHIATRIC: NEGATIVE for changes in mood or affect    EXAM:   /64 (BP Location: Left arm, Patient  Position: Chair, Cuff Size: Adult Regular)   Pulse 72   Temp 97.7  F (36.5  C) (Tympanic)   Wt 73 kg (161 lb)   LMP 04/17/2007 (Approximate)   BMI 26.79 kg/m      GENERAL APPEARANCE: healthy, alert and no distress     EYES: EOMI, PERRL     HENT: ear canals and TM's normal and nose and mouth without ulcers or lesions     NECK: no adenopathy, no asymmetry, masses, or scars and thyroid normal to palpation     RESP: lungs clear to auscultation - no rales, rhonchi or wheezes     CV: regular rates and rhythm, normal S1 S2, no S3 or S4; Grade II / VI systolic murmur (chronic and stable); no click or rub     ABDOMEN:  soft, nontender, no HSM or masses and bowel sounds normal     MS: extremities normal- no gross deformities noted, no evidence of inflammation in joints, FROM in all extremities. **Exception - right shoulder with pain and reduced ROM.      SKIN: no suspicious lesions or rashes     NEURO: Normal strength and tone, sensory exam grossly normal, mentation intact and speech normal     PSYCH: mentation appears normal. and affect normal/bright     LYMPHATICS: No cervical adenopathy    DIAGNOSTICS:   No EKG indicated. Non-vascular surgery and without cardiac risk factors. Last EKG in March of this year was unremarkable (sinus bradycardia).    Lab Results   Component Value Date    WBC 3.0 07/08/2019     Lab Results   Component Value Date    RBC 3.80 07/08/2019     Lab Results   Component Value Date    HGB 12.7 07/08/2019     Lab Results   Component Value Date    HCT 39.3 07/08/2019     Lab Results   Component Value Date     07/08/2019     Lab Results   Component Value Date    MCH 33.4 07/08/2019     Lab Results   Component Value Date    MCHC 32.3 07/08/2019     Lab Results   Component Value Date    RDW 12.7 07/08/2019     Lab Results   Component Value Date     07/08/2019       Recent Labs   Lab Test 03/13/19  0655 03/12/19  0645 03/11/19  0906 03/05/19  0742   HGB 9.5* 9.9* 12.8 13.3   * 120*  149* 179   INR  --   --   --  1.00   NA  --  141 140 141   POTASSIUM  --  4.2 3.7 4.2   CR  --  0.65 0.67 0.65     IMPRESSION:   Reason for surgery/procedure: Right rotator cuff repair with possible tenotomy biceps tendon    Diagnosis/reason for consult: Preoperative clearance    The proposed surgical procedure is considered INTERMEDIATE risk.    REVISED CARDIAC RISK INDEX  The patient has the following serious cardiovascular risks for perioperative complications such as (MI, PE, VFib and 3  AV Block):  No serious cardiac risks  INTERPRETATION: 0 risks: Class I (very low risk - 0.4% complication rate)    The patient has the following additional risks for perioperative complications:  No identified additional risks      ICD-10-CM    1. Preop general physical exam Z01.818 CBC with platelets   2. Complete tear of right rotator cuff, unspecified whether traumatic M75.121        RECOMMENDATIONS:     --Patient is to take all scheduled medications on the day of surgery EXCEPT for modifications listed below.    APPROVAL GIVEN to proceed with proposed procedure, without further diagnostic evaluation       Signed Electronically by: Jone Wrgiht NP    Copy of this evaluation report is provided to requesting physician.    [facility] Preop Guidelines    Revised Cardiac Risk Index

## 2019-07-08 NOTE — Clinical Note
Please abstract the following data from this visit with this patient into the appropriate field in Epic:Pap smear done on this date: 7/2018 (approximately), by this group: [facility] , results were normal .

## 2019-09-29 ENCOUNTER — HEALTH MAINTENANCE LETTER (OUTPATIENT)
Age: 64
End: 2019-09-29

## 2020-05-22 ENCOUNTER — TRANSFERRED RECORDS (OUTPATIENT)
Dept: HEALTH INFORMATION MANAGEMENT | Facility: CLINIC | Age: 65
End: 2020-05-22

## 2020-06-01 ENCOUNTER — TRANSFERRED RECORDS (OUTPATIENT)
Dept: HEALTH INFORMATION MANAGEMENT | Facility: CLINIC | Age: 65
End: 2020-06-01

## 2020-06-30 ENCOUNTER — TELEPHONE (OUTPATIENT)
Dept: FAMILY MEDICINE | Facility: CLINIC | Age: 65
End: 2020-06-30

## 2020-06-30 NOTE — TELEPHONE ENCOUNTER
Called patient to gather more information. Patient was seen by [facility] ([facility]) with Dr. Chris Chan and was told by the orthopedic Dr that it order to have surgery of the L. Shoulder patient will need to have Chest XR. Patient stated back in May she had a L. Shoulder XR where Dr. Chan noticed spots on patients lungs, so would like to have patient recheck before moving forward with surgery and pre-op office visit.       In order to even schedule a surgery patient needs to have her chest XR done sooner rather than later. Patient has a physical scheduled with Dr. Claudio early July and would like to have Chest XR done either during that visit or sooner if possible.     Patient stated that she is in contact with Dr. Chan's Nurse requesting that Dr. Chan orders a Chest XR and to fax orders over to the clinic here in [place].     RN advised in order to move forward with this we will need to await faxed overs from Dr. Chan's office at [facility], patient was given main fax number here at [place]. RN advised to patient that she will send a message to Dr. Claudio as an FYI and also to  to please look out for faxed order.     TC , once you have received order, please call patient to schedule XR for Chest. Patient states she will also follow up once she is informed by [facility] when orders are faxed.     Patient verbalized understanding and agrees with plan.     Routing to  and Dr. Claudio.    Azra Pratt RN, BSN  [facility] [place]

## 2020-06-30 NOTE — TELEPHONE ENCOUNTER
Reason for Call:  Other appointment    Detailed comments:    Pt is in need of a chest xray so that she can get her surgery done on her shoulder. Dr who is doing it saw something on the xray that they did. Can this be done at her [facility] on July 8th? Plz call pt to [employer].       Phone Number Patient can be reached at: Cell number on file:    Telephone Information:   Mobile [phone]       Best Time: any    Can we leave a detailed message on this number? YES    Call taken on 6/30/2020 at 1:48 PM by Nelly Ibrahim

## 2020-06-30 NOTE — TELEPHONE ENCOUNTER
She should probably make an appointment just to do the CXR.  I would recommend getting a report of the shoulder xray and the images on a CD or sent over to us.  And then I would like to see her (or she could see Jose since she's seen him before) for a visit to review her symptoms and get an xray.       Tracy Claudio MD

## 2020-07-01 NOTE — TELEPHONE ENCOUNTER
S/w pt and gave Dr. Claudio's reply below.  Pt states she will go to [facility] and get records.  Scheduled with Jose on Friday at 9:20.    Pt states understanding.    Kathia HERNANDEZ RN  EP Triage

## 2020-07-03 ENCOUNTER — OFFICE VISIT (OUTPATIENT)
Dept: FAMILY MEDICINE | Facility: CLINIC | Age: 65
End: 2020-07-03

## 2020-07-03 ENCOUNTER — ANCILLARY PROCEDURE (OUTPATIENT)
Dept: GENERAL RADIOLOGY | Facility: CLINIC | Age: 65
End: 2020-07-03
Attending: NURSE PRACTITIONER
Payer: COMMERCIAL

## 2020-07-03 VITALS
TEMPERATURE: 97.2 F | DIASTOLIC BLOOD PRESSURE: 72 MMHG | HEART RATE: 62 BPM | OXYGEN SATURATION: 100 % | WEIGHT: 170 LBS | SYSTOLIC BLOOD PRESSURE: 136 MMHG | BODY MASS INDEX: 28.29 KG/M2

## 2020-07-03 DIAGNOSIS — R93.89 ABNORMAL FINDING ON CHEST XRAY: ICD-10-CM

## 2020-07-03 DIAGNOSIS — R93.89 ABNORMAL FINDING ON CHEST XRAY: Primary | ICD-10-CM

## 2020-07-03 PROCEDURE — 99213 OFFICE O/P EST LOW 20 MIN: CPT | Performed by: NURSE PRACTITIONER

## 2020-07-03 PROCEDURE — 71046 X-RAY EXAM CHEST 2 VIEWS: CPT | Mod: FY

## 2020-07-03 RX ORDER — IBUPROFEN 800 MG/1
800 TABLET, FILM COATED ORAL EVERY 8 HOURS PRN
COMMUNITY
End: 2024-01-23

## 2020-07-03 RX ORDER — TRAMADOL HYDROCHLORIDE 50 MG/1
50 TABLET ORAL EVERY 6 HOURS PRN
COMMUNITY
End: 2020-08-10

## 2020-07-03 NOTE — PROGRESS NOTES
Subjective     Yessy Kuhn is a 65 year old female who presents to clinic today for the following health issues:      Concern - Pt is here for  A f/u. Orthopedist found a mass on her left shoulder - is here for a CXR    HPI: Yessy presents today to work up an incidental pulmonary finding (on shoulder x-ray). She suffers with bilateral rotator cuff pathology. She had her right shoulder operated on last summer, and she is in the process of planning surgery on her left. However, on left shoulder x-ray, a presumed calcified granuloma was discovered incidentally. Her shoulder surgeon recommended that she pursue workup for this before planning her surgery. Never smoker. No respiratory symptoms. No night sweats, unintentional weight loss.     Patient Active Problem List   Diagnosis     Mitral valve disorder     Herniated cervical intervertebral disc     CARDIOVASCULAR SCREENING; LDL GOAL LESS THAN 160     Obesity, Class II, BMI 35-39.9     S/P total knee arthroplasty     Past Surgical History:   Procedure Laterality Date     ARTHROPLASTY KNEE Right 3/11/2019    Procedure: RIGHT TOTAL KNEE ARTHROPLASTY;  Surgeon: Tate Cohn MD;  Location: [facility] OR     CHOLECYSTECTOMY, OPEN  1991       Social History     Tobacco Use     Smoking status: Never Smoker     Smokeless tobacco: Never Used   Substance Use Topics     Alcohol use: No     Alcohol/week: 0.0 standard drinks     Family History   Problem Relation Age of Onset     Respiratory Mother         emphysema - heavy smoker     Cancer Father         bladder and lung at age 50     Diabetes Paternal Grandmother         adult onset     Arthritis Sister              Reviewed and updated as needed this visit by Provider  Tobacco  Allergies  Meds  Problems  Med Hx  Surg Hx  Fam Hx         Review of Systems   Constitutional, HEENT, pulmonary, MSK, systems are negative, except as otherwise noted.      Objective    /72   Pulse 62   Temp 97.2  F (36.2  C)  (Tympanic)   Wt 77.1 kg (170 lb)   LMP 04/17/2007 (Approximate)   SpO2 100%   BMI 28.29 kg/m    Body mass index is 28.29 kg/m .  Physical Exam   GENERAL: healthy, alert and no distress  RESP: lungs clear to auscultation - no rales, rhonchi or wheezes  CV: regular rate and rhythm, normal S1 S2, no S3 or S4, no murmur, click or rub, no peripheral edema and peripheral pulses strong  NEURO: Normal strength and tone, mentation intact and speech normal    Diagnostic Test Results:  Labs reviewed in Epic  Xray - FINDINGS: Calcified granuloma projected over the mid left lung. Lungs  are otherwise clear. No pneumothorax or pleural effusion. Heart size  normal.         Assessment & Plan     Yessy was seen today for recheck.    Diagnoses and all orders for this visit:    Abnormal finding on chest xray  Comment: We discussed common findings on CXR and how to follow them. The reading radiologist for her shoulder x-ray suspected calcified granuloma, which often do not require follow up / surveillance. We discussed options today for characterizing this, and we decided to order a CXR first, and, if indicated, I can order a chest CT. She agrees with this approach. I will send her radiology report this afternoon and any indicated plans for follow up.     Addendum: Calcified granuloma seen on CXR (mid left lung). No other abnormalities. I called her to discuss the likely benign nature of this finding and offered a follow up CXR in 6 months or so to ensure stability. She agrees with this plan.     -     XR Chest 2 Views; Future        See Patient Instructions    Return in about 1 week (around 7/10/2020) for persistent or worsening symptoms.    Jone Wright NP  [facility] [place]

## 2020-07-28 ENCOUNTER — TELEPHONE (OUTPATIENT)
Dept: FAMILY MEDICINE | Facility: CLINIC | Age: 65
End: 2020-07-28

## 2020-07-28 NOTE — TELEPHONE ENCOUNTER
Thank you.    Please call her and let her know that she should do her best to avoid potential infection (if her  would happen to be infected due to his low-risk exposure).     - Wash hands frequently  - Avoid touching mouth, nose, eyes  - Frequently disinfect high-touch surfaces in the home  - Avoid close physical contact with her  until his quarantine is complete    To be on the safe side, I would have her just let her employer know the situation. Every employer has a different approach to situations like this (some will have her continue to work but have her check her temperature a couple times a day; some will have her stay home). I think they should be the ones making that decision based on their policy.     If she or her  develop any symptoms, however, that changes things and they should be tested. Hope this helps.     Thanks,    Jose

## 2020-07-28 NOTE — TELEPHONE ENCOUNTER
General Call:   Who is calling:  Pt  Reason for Call:  Pt has covid questions-if quarentined  What are your questions or concerns:  na  Date of last appointment with provider: rajesh  Okay to leave a detailed message:Yes at Home number on file [phone] (home)

## 2020-07-28 NOTE — TELEPHONE ENCOUNTER
Called patient and informed her of CNP response below. Patient verbalized understanding and agrees with plan.     Azra Pratt RN, BSN  [facility] [place]

## 2020-07-28 NOTE — TELEPHONE ENCOUNTER
Patients  was exposed to someone at work who was tested positive for COVID-19. Patients  though was 6 feet away from the person at work didn't have close contact, but was in meetings with this person. Patients  is not symptomatic at all, but was told to quarantine for 2 weeks.    Patient is wondering if there is anything she should be doing. Should she be going to work? Her work requires everyone to wear a mask. Patient states that she doesn't kiss or have much physical touch with  so she doesn't seem concerned about doing any quarantine.        Patient has no symptoms currently.  has no symptoms currently.     Routing to MELLISSA Wright with recommendations    Okay to leave a detailed message? YES    Azra Pratt RN, BSN  [facility] [place] [place]

## 2020-08-10 ENCOUNTER — OFFICE VISIT (OUTPATIENT)
Dept: FAMILY MEDICINE | Facility: CLINIC | Age: 65
End: 2020-08-10
Payer: COMMERCIAL

## 2020-08-10 VITALS
HEIGHT: 65 IN | OXYGEN SATURATION: 99 % | DIASTOLIC BLOOD PRESSURE: 78 MMHG | HEART RATE: 65 BPM | WEIGHT: 163.2 LBS | TEMPERATURE: 96.9 F | BODY MASS INDEX: 27.19 KG/M2 | SYSTOLIC BLOOD PRESSURE: 128 MMHG

## 2020-08-10 DIAGNOSIS — Z01.818 PREOP GENERAL PHYSICAL EXAM: Primary | ICD-10-CM

## 2020-08-10 DIAGNOSIS — M75.102 ROTATOR CUFF SYNDROME, LEFT: ICD-10-CM

## 2020-08-10 LAB
BASOPHILS # BLD AUTO: 0 10E9/L (ref 0–0.2)
BASOPHILS NFR BLD AUTO: 0.3 %
DIFFERENTIAL METHOD BLD: ABNORMAL
EOSINOPHIL # BLD AUTO: 0 10E9/L (ref 0–0.7)
EOSINOPHIL NFR BLD AUTO: 0.5 %
ERYTHROCYTE [DISTWIDTH] IN BLOOD BY AUTOMATED COUNT: 11.8 % (ref 10–15)
HCT VFR BLD AUTO: 39 % (ref 35–47)
HGB BLD-MCNC: 13.1 G/DL (ref 11.7–15.7)
LYMPHOCYTES # BLD AUTO: 1 10E9/L (ref 0.8–5.3)
LYMPHOCYTES NFR BLD AUTO: 26.9 %
MCH RBC QN AUTO: 34.4 PG (ref 26.5–33)
MCHC RBC AUTO-ENTMCNC: 33.6 G/DL (ref 31.5–36.5)
MCV RBC AUTO: 102 FL (ref 78–100)
MONOCYTES # BLD AUTO: 0.2 10E9/L (ref 0–1.3)
MONOCYTES NFR BLD AUTO: 5.3 %
NEUTROPHILS # BLD AUTO: 2.5 10E9/L (ref 1.6–8.3)
NEUTROPHILS NFR BLD AUTO: 67 %
PLATELET # BLD AUTO: 177 10E9/L (ref 150–450)
RBC # BLD AUTO: 3.81 10E12/L (ref 3.8–5.2)
WBC # BLD AUTO: 3.8 10E9/L (ref 4–11)

## 2020-08-10 PROCEDURE — 99214 OFFICE O/P EST MOD 30 MIN: CPT | Performed by: NURSE PRACTITIONER

## 2020-08-10 PROCEDURE — 80048 BASIC METABOLIC PNL TOTAL CA: CPT | Performed by: NURSE PRACTITIONER

## 2020-08-10 PROCEDURE — 85025 COMPLETE CBC W/AUTO DIFF WBC: CPT | Performed by: NURSE PRACTITIONER

## 2020-08-10 PROCEDURE — 93000 ELECTROCARDIOGRAM COMPLETE: CPT | Performed by: NURSE PRACTITIONER

## 2020-08-10 PROCEDURE — 36415 COLL VENOUS BLD VENIPUNCTURE: CPT | Performed by: NURSE PRACTITIONER

## 2020-08-10 ASSESSMENT — MIFFLIN-ST. JEOR: SCORE: 1288.21

## 2020-08-10 NOTE — PROGRESS NOTES
[facility] [place]  [address]  [place] [address]  [phone]  Dept: [phone]    PRE-OP EVALUATION:  Today's date: 8/10/2020    Yessy Kuhn (: 1955) presents for pre-operative evaluation assessment as requested by Dr. Chris Chan.  She requires evaluation and anesthesia risk assessment prior to undergoing surgery/procedure for treatment of left shoulder rotator cuff, biceps tenotomy .    Proposed Surgery/ Procedure: See above  Date of Surgery/ Procedure: 20  Time of Surgery/ Procedure: [facility]  Hospital/Surgical Facility: [facility]  Surgery Fax Number: [phone]  Primary Physician: Clinic, [facility] [place]  Type of Anesthesia Anticipated: General    Preoperative Questionnaire:   No - Have you ever had a heart attack or stroke?  No - Have you ever had surgery on your heart or blood vessels, such as a stent, coronary (heart) bypass, or surgery on an artery in the head, neck, heart, or legs?  No - Do you have chest pain when you are physically active?  No - Do you have a history of heart failure?  No - Do you currently have a cold, bronchitis, or symptoms of other respiratory (head and chest) infections?  No - Do you have a cough, shortness of breath, or wheezing?  No - Do you or anyone in your family have a history of blood clots?  No - Do you or anyone in your family have a serious bleeding problem, such as long-lasting bleeding after surgeries or cuts?  No - Have you ever had anemia or been told to take iron pills?  No - Have you had any abnormal blood loss such as black, tarry or bloody stools, or abnormal vaginal bleeding?  No - Have you ever had a blood transfusion?  Yes - Are you willing to have a blood transfusion if it is medically needed before, during, or after your surgery?  No - Have you or anyone in your family ever had problems with anesthesia (sedation for surgery)?  No - Do you have sleep apnea, excessive snoring, or  daytime drowsiness?   No - Do you have any artifical heart valves or other implanted medical devices, such as a pacemaker, defibrillator, or continuous glucose monitor?  Yes - Do you have any artifical joints?  Yes- Are you allergic to latex?  No - Is there any chance that you may be pregnant?    Patient has a Health Care Directive or Living Will:  YES     HPI:     HPI related to upcoming procedure: Left rotator cuff tear. Elective repair on 8/17.      See problem list for active medical problems.  Problems all longstanding and stable, except as noted/documented.  See ROS for pertinent symptoms related to these conditions.      MEDICAL HISTORY:     Patient Active Problem List    Diagnosis Date Noted     S/P total knee arthroplasty 03/11/2019     Priority: Medium     Obesity, Class II, BMI 35-39.9 04/18/2016     Priority: Medium     CARDIOVASCULAR SCREENING; LDL GOAL LESS THAN 160 10/31/2010     Priority: Medium     Herniated cervical intervertebral disc 07/16/2008     Priority: Medium     Mitral valve disorder      Priority: Medium     prolapse  Problem list name updated by automated process. Provider to review        Past Medical History:   Diagnosis Date     Irritable bowel syndrome     constipation     Mitral valve prolapse      PONV (postoperative nausea and vomiting)      Presence of intrauterine contraceptive device   1997    removed 2014     Past Surgical History:   Procedure Laterality Date     ARTHROPLASTY KNEE Right 3/11/2019    Procedure: RIGHT TOTAL KNEE ARTHROPLASTY;  Surgeon: Tate Cohn MD;  Location:  OR     CHOLECYSTECTOMY, OPEN  1991     Current Outpatient Medications   Medication Sig Dispense Refill     acetaminophen (TYLENOL) 325 MG tablet Take 650 mg by mouth 2 times daily as needed for mild pain       ibuprofen (ADVIL/MOTRIN) 800 MG tablet Take 800 mg by mouth every 8 hours as needed for moderate pain       melatonin 5 MG tablet Take 10 mg by mouth nightly as needed for sleep (2  "x 5mg = 10mg)       OTC products: None, except as noted above    Allergies   Allergen Reactions     Antihistamine [Alkylamines]      Emotional reactions      Latex Allergy: NO    Social History     Tobacco Use     Smoking status: Never Smoker     Smokeless tobacco: Never Used   Substance Use Topics     Alcohol use: No     Alcohol/week: 0.0 standard drinks     History   Drug Use No       REVIEW OF SYSTEMS:   CONSTITUTIONAL: NEGATIVE for fever, chills, change in weight  INTEGUMENTARY/SKIN: NEGATIVE for worrisome rashes, moles or lesions  EYES: NEGATIVE for vision changes or irritation  ENT/MOUTH: NEGATIVE for ear, mouth and throat problems  RESP: NEGATIVE for significant cough or SOB  BREAST: NEGATIVE for masses, tenderness or discharge  CV: NEGATIVE for chest pain, palpitations or peripheral edema  GI: NEGATIVE for nausea, abdominal pain, heartburn, or change in bowel habits  : NEGATIVE for frequency, dysuria, or hematuria  MUSCULOSKELETAL: NEGATIVE for significant arthralgias or myalgia  NEURO: NEGATIVE for weakness, dizziness or paresthesias  ENDOCRINE: NEGATIVE for temperature intolerance, skin/hair changes  HEME: NEGATIVE for bleeding problems  PSYCHIATRIC: NEGATIVE for changes in mood or affect    EXAM:   /78   Pulse 65   Temp 96.9  F (36.1  C) (Tympanic)   Ht 1.638 m (5' 4.5\")   Wt 74 kg (163 lb 3.2 oz)   LMP 04/17/2007 (Approximate)   SpO2 99%   BMI 27.58 kg/m      GENERAL APPEARANCE: healthy, alert and no distress     EYES: EOMI, PERRL     HENT: ear canals and TM's normal and nose and mouth without ulcers or lesions     NECK: no adenopathy, no asymmetry, masses, or scars and thyroid normal to palpation     RESP: lungs clear to auscultation - no rales, rhonchi or wheezes     CV: regular rates and rhythm, normal S1 S2, no S3 or S4 and no murmur, click or rub     ABDOMEN:  soft, nontender, no HSM or masses and bowel sounds normal     MS: extremities normal- no gross deformities noted, no evidence " of inflammation in joints, FROM in all extremities.     SKIN: no suspicious lesions or rashes     NEURO: Normal strength and tone, sensory exam grossly normal, mentation intact and speech normal     PSYCH: mentation appears normal. and affect normal/bright     LYMPHATICS: No cervical adenopathy    DIAGNOSTICS:     EKG: Sinus bradycardia, normal axis, normal intervals, no acute ST/T changes c/w ischemia, no LVH by voltage criteria, nonspecific ST-T change in V-2, unchanged from previous tracings    Labs Resulted Today:   Results for orders placed or performed in visit on 08/10/20   CBC with platelets and differential     Status: Abnormal   Result Value Ref Range    WBC 3.8 (L) 4.0 - 11.0 10e9/L    RBC Count 3.81 3.8 - 5.2 10e12/L    Hemoglobin 13.1 11.7 - 15.7 g/dL    Hematocrit 39.0 35.0 - 47.0 %     (H) 78 - 100 fl    MCH 34.4 (H) 26.5 - 33.0 pg    MCHC 33.6 31.5 - 36.5 g/dL    RDW 11.8 10.0 - 15.0 %    Platelet Count 177 150 - 450 10e9/L    % Neutrophils 67.0 %    % Lymphocytes 26.9 %    % Monocytes 5.3 %    % Eosinophils 0.5 %    % Basophils 0.3 %    Absolute Neutrophil 2.5 1.6 - 8.3 10e9/L    Absolute Lymphocytes 1.0 0.8 - 5.3 10e9/L    Absolute Monocytes 0.2 0.0 - 1.3 10e9/L    Absolute Eosinophils 0.0 0.0 - 0.7 10e9/L    Absolute Basophils 0.0 0.0 - 0.2 10e9/L    Diff Method Automated Method    Basic metabolic panel     Status: Abnormal   Result Value Ref Range    Sodium 132 (L) 133 - 144 mmol/L    Potassium 4.4 3.4 - 5.3 mmol/L    Chloride 98 94 - 109 mmol/L    Carbon Dioxide 25 20 - 32 mmol/L    Anion Gap 9 3 - 14 mmol/L    Glucose 83 70 - 99 mg/dL    Urea Nitrogen 8 7 - 30 mg/dL    Creatinine 0.57 0.52 - 1.04 mg/dL    GFR Estimate >90 >60 mL/min/[1.73_m2]    GFR Estimate If Black >90 >60 mL/min/[1.73_m2]    Calcium 9.7 8.5 - 10.1 mg/dL        Labs Drawn and in Process:   Unresulted Labs Ordered in the Past 30 Days of this Admission     No orders found for last 31 day(s).          Recent Labs   Lab  Test 07/08/19  1218 03/13/19  0655 03/12/19  0645 03/11/19  0906 03/05/19  0742   HGB 12.7 9.5* 9.9* 12.8 13.3   * 117* 120* 149* 179   INR  --   --   --   --  1.00   NA  --   --  141 140 141   POTASSIUM  --   --  4.2 3.7 4.2   CR  --   --  0.65 0.67 0.65      IMPRESSION:   Reason for surgery/procedure: Left shoulder rotator cuff injury  Diagnosis/reason for consult: Preoperative clearance    The proposed surgical procedure is considered INTERMEDIATE risk.    REVISED CARDIAC RISK INDEX  The patient has the following serious cardiovascular risks for perioperative complications such as (MI, PE, VFib and 3  AV Block):  No serious cardiac risks  INTERPRETATION: 0 risks: Class I (very low risk - 0.4% complication rate)    The patient has the following additional risks for perioperative complications:  No identified additional risks      ICD-10-CM    1. Preop general physical exam  Z01.818 EKG 12-lead complete w/read - Clinics     CBC with platelets and differential     Basic metabolic panel   2. Rotator cuff syndrome, left  M75.102        RECOMMENDATIONS:     --Patient is on no chronic medications    APPROVAL GIVEN to proceed with proposed procedure, without further diagnostic evaluation       Signed Electronically by: Jone Wright NP    Copy of this evaluation report is provided to requesting physician.    [facility] Preop Guidelines    Revised Cardiac Risk Index

## 2020-08-11 DIAGNOSIS — E87.1 HYPONATREMIA: Primary | ICD-10-CM

## 2020-08-11 LAB
ANION GAP SERPL CALCULATED.3IONS-SCNC: 9 MMOL/L (ref 3–14)
BUN SERPL-MCNC: 8 MG/DL (ref 7–30)
CALCIUM SERPL-MCNC: 9.7 MG/DL (ref 8.5–10.1)
CHLORIDE SERPL-SCNC: 98 MMOL/L (ref 94–109)
CO2 SERPL-SCNC: 25 MMOL/L (ref 20–32)
CREAT SERPL-MCNC: 0.57 MG/DL (ref 0.52–1.04)
GFR SERPL CREATININE-BSD FRML MDRD: >90 ML/MIN/{1.73_M2}
GLUCOSE SERPL-MCNC: 83 MG/DL (ref 70–99)
POTASSIUM SERPL-SCNC: 4.4 MMOL/L (ref 3.4–5.3)
SODIUM SERPL-SCNC: 132 MMOL/L (ref 133–144)

## 2020-12-14 ENCOUNTER — OFFICE VISIT (OUTPATIENT)
Dept: FAMILY MEDICINE | Facility: CLINIC | Age: 65
End: 2020-12-14
Payer: COMMERCIAL

## 2020-12-14 VITALS
DIASTOLIC BLOOD PRESSURE: 62 MMHG | SYSTOLIC BLOOD PRESSURE: 118 MMHG | OXYGEN SATURATION: 98 % | HEART RATE: 58 BPM | WEIGHT: 171 LBS | HEIGHT: 65 IN | TEMPERATURE: 97.6 F | BODY MASS INDEX: 28.49 KG/M2

## 2020-12-14 DIAGNOSIS — G56.01 CARPAL TUNNEL SYNDROME OF RIGHT WRIST: ICD-10-CM

## 2020-12-14 DIAGNOSIS — Z01.818 PREOP GENERAL PHYSICAL EXAM: Primary | ICD-10-CM

## 2020-12-14 DIAGNOSIS — Z12.31 ENCOUNTER FOR SCREENING MAMMOGRAM FOR MALIGNANT NEOPLASM OF BREAST: ICD-10-CM

## 2020-12-14 LAB
ANION GAP SERPL CALCULATED.3IONS-SCNC: 6 MMOL/L (ref 3–14)
BUN SERPL-MCNC: 13 MG/DL (ref 7–30)
CALCIUM SERPL-MCNC: 9.5 MG/DL (ref 8.5–10.1)
CHLORIDE SERPL-SCNC: 106 MMOL/L (ref 94–109)
CO2 SERPL-SCNC: 26 MMOL/L (ref 20–32)
CREAT SERPL-MCNC: 0.59 MG/DL (ref 0.52–1.04)
ERYTHROCYTE [DISTWIDTH] IN BLOOD BY AUTOMATED COUNT: 12.3 % (ref 10–15)
GFR SERPL CREATININE-BSD FRML MDRD: >90 ML/MIN/{1.73_M2}
GLUCOSE SERPL-MCNC: 87 MG/DL (ref 70–99)
HCT VFR BLD AUTO: 39.2 % (ref 35–47)
HGB BLD-MCNC: 12.8 G/DL (ref 11.7–15.7)
MCH RBC QN AUTO: 33.4 PG (ref 26.5–33)
MCHC RBC AUTO-ENTMCNC: 32.7 G/DL (ref 31.5–36.5)
MCV RBC AUTO: 102 FL (ref 78–100)
PLATELET # BLD AUTO: 161 10E9/L (ref 150–450)
POTASSIUM SERPL-SCNC: 4.8 MMOL/L (ref 3.4–5.3)
RBC # BLD AUTO: 3.83 10E12/L (ref 3.8–5.2)
SODIUM SERPL-SCNC: 138 MMOL/L (ref 133–144)
WBC # BLD AUTO: 2.9 10E9/L (ref 4–11)

## 2020-12-14 PROCEDURE — 85027 COMPLETE CBC AUTOMATED: CPT | Performed by: NURSE PRACTITIONER

## 2020-12-14 PROCEDURE — 80048 BASIC METABOLIC PNL TOTAL CA: CPT | Performed by: NURSE PRACTITIONER

## 2020-12-14 PROCEDURE — 99214 OFFICE O/P EST MOD 30 MIN: CPT | Performed by: NURSE PRACTITIONER

## 2020-12-14 PROCEDURE — 93000 ELECTROCARDIOGRAM COMPLETE: CPT | Performed by: NURSE PRACTITIONER

## 2020-12-14 PROCEDURE — 36415 COLL VENOUS BLD VENIPUNCTURE: CPT | Performed by: NURSE PRACTITIONER

## 2020-12-14 ASSESSMENT — MIFFLIN-ST. JEOR: SCORE: 1323.59

## 2020-12-14 NOTE — PROGRESS NOTES
[facility]  [address] [address]  Phone: [phone]  Primary Provider: Yamila Reading [place]  Pre-op Performing Provider: LISBET RAO    PREOPERATIVE EVALUATION:  Today's date: 12/14/2020    Yessy Kuhn is a 65 year old female who presents for a preoperative evaluation.    Surgical Information:  Surgery/Procedure: right wrist carpal tunnel surgery  Surgery Location: [facility]  Surgeon: Dr. Nissa Navarro  Surgery Date: 12/18/2020  Time of Surgery: 530 AM  Where patient plans to recover: At home with family  Fax number for surgical facility: [phone]    Type of Anesthesia Anticipated: Local with MAC    Subjective     Preoperative Questionnaire:   No - Have you ever had a heart attack or stroke?  No - Have you ever had surgery on your heart or blood vessels, such as a stent, coronary (heart) bypass, or surgery on an artery in the head, neck, heart, or legs?  No - Do you have chest pain when you are physically active?  No - Do you have a history of heart failure?  No - Do you currently have a cold, bronchitis, or symptoms of other respiratory (head and chest) infections?  No - Do you have a cough, shortness of breath, or wheezing?  No - Do you or anyone in your family have a history of blood clots?  No - Do you or anyone in your family have a serious bleeding problem, such as long-lasting bleeding after surgeries or cuts?  No - Have you ever had anemia or been told to take iron pills?  No - Have you had any abnormal blood loss such as black, tarry or bloody stools, or abnormal vaginal bleeding?  No - Have you ever had a blood transfusion?  Yes - Are you willing to have a blood transfusion if it is medically needed before, during, or after your surgery?  No - Have you or anyone in your family ever had problems with anesthesia (sedation for surgery)?  No - Do you have sleep apnea, excessive snoring, or daytime drowsiness?   No  - Do you have any artifical heart valves or other implanted medical devices, such as a pacemaker, defibrillator, or continuous glucose monitor?  yes - Do you have any artifical joints?  yes - Are you allergic to latex?  No - Is there any chance that you may be pregnant?    Patient has a Health Care Directive or Living Will:  NO    HPI: Will have right wrist CT release on 12/18/2020.     No flowsheet data found.    Health Care Directive:  Patient has a Health Care Directive on file      Preoperative Review of :   reviewed - no record of controlled substances prescribed.      Status of Chronic Conditions:  See problem list for active medical problems.  Problems all longstanding and stable, except as noted/documented.  See ROS for pertinent symptoms related to these conditions.      Review of Systems  CONSTITUTIONAL: NEGATIVE for fever, chills, change in weight  INTEGUMENTARY/SKIN: NEGATIVE for worrisome rashes, moles or lesions  EYES: NEGATIVE for vision changes or irritation  ENT/MOUTH: NEGATIVE for ear, mouth and throat problems  RESP: NEGATIVE for significant cough or SOB  BREAST: NEGATIVE for masses, tenderness or discharge  CV: NEGATIVE for chest pain, palpitations or peripheral edema  GI: NEGATIVE for nausea, abdominal pain, heartburn, or change in bowel habits  : NEGATIVE for frequency, dysuria, or hematuria  MUSCULOSKELETAL: See HPI  NEURO: See HPI  ENDOCRINE: NEGATIVE for temperature intolerance, skin/hair changes  HEME: NEGATIVE for bleeding problems  PSYCHIATRIC: NEGATIVE for changes in mood or affect    Patient Active Problem List    Diagnosis Date Noted     S/P total knee arthroplasty 03/11/2019     Priority: Medium     Obesity, Class II, BMI 35-39.9 04/18/2016     Priority: Medium     CARDIOVASCULAR SCREENING; LDL GOAL LESS THAN 160 10/31/2010     Priority: Medium     Herniated cervical intervertebral disc 07/16/2008     Priority: Medium     Mitral valve disorder      Priority: Medium      "prolapse  Problem list name updated by automated process. Provider to review        Past Medical History:   Diagnosis Date     Irritable bowel syndrome     constipation     Mitral valve prolapse      PONV (postoperative nausea and vomiting)      Presence of intrauterine contraceptive device   1997    removed 2014     Past Surgical History:   Procedure Laterality Date     ARTHROPLASTY KNEE Right 3/11/2019    Procedure: RIGHT TOTAL KNEE ARTHROPLASTY;  Surgeon: Tate Cohn MD;  Location: [facility] OR     CHOLECYSTECTOMY, OPEN  1991     Current Outpatient Medications   Medication Sig Dispense Refill     acetaminophen (TYLENOL) 325 MG tablet Take 650 mg by mouth 2 times daily as needed for mild pain       ibuprofen (ADVIL/MOTRIN) 800 MG tablet Take 800 mg by mouth every 8 hours as needed for moderate pain       melatonin 5 MG tablet Take 10 mg by mouth nightly as needed for sleep (2 x 5mg = 10mg)         Allergies   Allergen Reactions     Antihistamine [Alkylamines]      Emotional reactions        Social History     Tobacco Use     Smoking status: Never Smoker     Smokeless tobacco: Never Used   Substance Use Topics     Alcohol use: No     Alcohol/week: 0.0 standard drinks     Family History   Problem Relation Age of Onset     Respiratory Mother         emphysema - heavy smoker     Cancer Father         bladder and lung at age 50     Diabetes Paternal Grandmother         adult onset     Arthritis Sister      History   Drug Use No         Objective     /62 (BP Location: Right arm, Cuff Size: Adult Regular)   Pulse 58   Temp 97.6  F (36.4  C) (Tympanic)   Ht 1.638 m (5' 4.5\")   Wt 77.6 kg (171 lb)   LMP 04/17/2007 (Approximate)   SpO2 98%   BMI 28.90 kg/m      Physical Exam    GENERAL APPEARANCE: healthy, alert and no distress     EYES: EOMI, PERRL     HENT: ear canals and TM's normal and nose and mouth without ulcers or lesions     NECK: no adenopathy, no asymmetry, masses, or scars and thyroid normal " to palpation     RESP: lungs clear to auscultation - no rales, rhonchi or wheezes     CV: regular rates and rhythm, normal S1 S2, no S3 or S4 and no murmur, click or rub     ABDOMEN:  soft, nontender, no HSM or masses and bowel sounds normal     MS: extremities normal- no gross deformities noted, no evidence of inflammation in joints, FROM in all extremities.     SKIN: no suspicious lesions or rashes     NEURO: Normal strength and tone, sensory exam grossly normal, mentation intact and speech normal     PSYCH: mentation appears normal. and affect normal/bright     LYMPHATICS: No cervical adenopathy    Recent Labs   Lab Test 08/10/20  1141 07/08/19  1218 03/12/19  0645 03/12/19  0645 03/05/19  0742 03/05/19  0742   HGB 13.1 12.7   < > 9.9*   < > 13.3    137*   < > 120*   < > 179   INR  --   --   --   --   --  1.00   *  --   --  141   < > 141   POTASSIUM 4.4  --   --  4.2   < > 4.2   CR 0.57  --   --  0.65   < > 0.65    < > = values in this interval not displayed.        Diagnostics:  Recent Results (from the past 48 hour(s))   Basic metabolic panel    Collection Time: 12/14/20  9:37 AM   Result Value Ref Range    Sodium 138 133 - 144 mmol/L    Potassium 4.8 3.4 - 5.3 mmol/L    Chloride 106 94 - 109 mmol/L    Carbon Dioxide 26 20 - 32 mmol/L    Anion Gap 6 3 - 14 mmol/L    Glucose 87 70 - 99 mg/dL    Urea Nitrogen 13 7 - 30 mg/dL    Creatinine 0.59 0.52 - 1.04 mg/dL    GFR Estimate >90 >60 mL/min/[1.73_m2]    GFR Estimate If Black >90 >60 mL/min/[1.73_m2]    Calcium 9.5 8.5 - 10.1 mg/dL   CBC with platelets    Collection Time: 12/14/20  9:37 AM   Result Value Ref Range    WBC 2.9 (L) 4.0 - 11.0 10e9/L    RBC Count 3.83 3.8 - 5.2 10e12/L    Hemoglobin 12.8 11.7 - 15.7 g/dL    Hematocrit 39.2 35.0 - 47.0 %     (H) 78 - 100 fl    MCH 33.4 (H) 26.5 - 33.0 pg    MCHC 32.7 31.5 - 36.5 g/dL    RDW 12.3 10.0 - 15.0 %    Platelet Count 161 150 - 450 10e9/L        EKG: sinus bradycardia, normal axis, normal  intervals, no acute ST/T changes c/w ischemia, no LVH by voltage criteria, unchanged from previous tracings    Revised Cardiac Risk Index (RCRI):  The patient has the following serious cardiovascular risks for perioperative complications:   - No serious cardiac risks = 0 points     RCRI Interpretation: 0 points: Class I (very low risk - 0.4% complication rate)         Assessment & Plan   The proposed surgical procedure is considered INTERMEDIATE risk.    Yessy was seen today for pre-op exam.    Diagnoses and all orders for this visit:    Preop general physical exam  -     EKG 12-lead complete w/read - Clinics  -     Basic metabolic panel  -     CBC with platelets    Carpal tunnel syndrome of right wrist    Encounter for screening mammogram for malignant neoplasm of breast  -     MA Screen Bilateral w/[facility]; Future        Risks and Recommendations:  The patient has the following additional risks and recommendations for perioperative complications:   - No identified additional risk factors other than previously addressed    Medication Instructions:  Patient is on no chronic medications    RECOMMENDATION:  APPROVAL GIVEN to proceed with proposed procedure, without further diagnostic evaluation.    Signed Electronically by: Jone Wright NP    Copy of this evaluation report is provided to requesting physician.    [facility]    Revised Cardiac Risk Index

## 2020-12-14 NOTE — PATIENT INSTRUCTIONS

## 2020-12-15 NOTE — PROGRESS NOTES
Pre-op has been faxed.    .Emiliana FAITH    [facility] [place]

## 2021-01-14 ENCOUNTER — HEALTH MAINTENANCE LETTER (OUTPATIENT)
Age: 66
End: 2021-01-14

## 2021-04-05 ENCOUNTER — IMMUNIZATION (OUTPATIENT)
Dept: NURSING | Facility: CLINIC | Age: 66
End: 2021-04-05
Payer: COMMERCIAL

## 2021-04-05 PROCEDURE — 0001A PR COVID VAC PFIZER DIL RECON 30 MCG/0.3 ML IM: CPT

## 2021-04-05 PROCEDURE — 91300 PR COVID VAC PFIZER DIL RECON 30 MCG/0.3 ML IM: CPT

## 2021-04-26 ENCOUNTER — IMMUNIZATION (OUTPATIENT)
Dept: NURSING | Facility: CLINIC | Age: 66
End: 2021-04-26
Attending: NURSE PRACTITIONER
Payer: COMMERCIAL

## 2021-04-26 PROCEDURE — 91300 PR COVID VAC PFIZER DIL RECON 30 MCG/0.3 ML IM: CPT

## 2021-04-26 PROCEDURE — 0002A PR COVID VAC PFIZER DIL RECON 30 MCG/0.3 ML IM: CPT

## 2021-05-27 ENCOUNTER — TELEPHONE (OUTPATIENT)
Dept: FAMILY MEDICINE | Facility: CLINIC | Age: 66
End: 2021-05-27

## 2021-05-27 NOTE — LETTER
June 2, 2021      [facility]essy [facility] [place]  [address] [place] [address]        Dear Yessy,    I care about your health and have reviewed your health plan. I have reviewed your medical conditions, medication list, and lab results and am making recommendations based on this review, to better manage your health.    You are in particular need of attention regarding:  -Breast Cancer Screening  -Wellness (Physical) Visit     I am recommending that you:  -schedule a WELLNESS (Physical) APPOINTMENT with me.   I will check fasting labs the same day - nothing to eat except water and meds for 8-10 hours prior.  -schedule a MAMMOGRAM which is due. We have a mobile mammogram unit that comes to [facility].To schedule please call the clinic at [phone]. Or, you can call [facility] at [phone] to schedule this.  Please disregard this reminder if you have had this exam elsewhere within the last year.  It would be helpful for us to have a copy of your mammogram report in our file so that we can best coordinate your care.    Here is a list of Health Maintenance topics that are due now or due soon:  Health Maintenance Due   Topic Date Due     ANNUAL REVIEW OF HM ORDERS  Never done     Preventive Care Visit  04/18/2017     Mammogram  06/01/2020     PHQ-2  01/01/2021     Cholesterol Lab  04/18/2021     PAP Smear  07/01/2021       Please call us at [phone] (or use [facility]) to address the above recommendations.     Thank you for trusting [facility] and we appreciate the opportunity to serve you.  We look forward to supporting your healthcare needs in the future.    Healthy Regards,    Jone Wright, APRN, CNP

## 2021-05-27 NOTE — TELEPHONE ENCOUNTER
Patient Quality Outreach      Summary:    Patient has the following on her problem list/HM: None    Patient is due/failing the following:   Breast Cancer Screening - Mammogram    Type of outreach:    Sent [employer] message.    Questions for provider review:    None                                                                                                                                     Ike TALAMANTES CMA

## 2021-06-02 NOTE — TELEPHONE ENCOUNTER
Patient Quality Outreach 2nd Attempt      Summary:    Type of outreach:    Sent letter.    Next Steps:  Reach out within 90 days via Phone.    Max number of attempts reached: Yes. Will try again in 90 days if patient still on fail list.    Questions for provider review:    None                                                                                                                    Ike TALAMANTES, CMA

## 2021-10-23 ENCOUNTER — HEALTH MAINTENANCE LETTER (OUTPATIENT)
Age: 66
End: 2021-10-23

## 2021-12-01 ENCOUNTER — OFFICE VISIT (OUTPATIENT)
Dept: FAMILY MEDICINE | Facility: CLINIC | Age: 66
End: 2021-12-01
Payer: COMMERCIAL

## 2021-12-01 VITALS
RESPIRATION RATE: 16 BRPM | OXYGEN SATURATION: 99 % | DIASTOLIC BLOOD PRESSURE: 74 MMHG | HEIGHT: 65 IN | SYSTOLIC BLOOD PRESSURE: 187 MMHG | WEIGHT: 175 LBS | TEMPERATURE: 97.1 F | HEART RATE: 59 BPM | BODY MASS INDEX: 29.16 KG/M2

## 2021-12-01 DIAGNOSIS — R03.0 ELEVATED BLOOD PRESSURE READING WITHOUT DIAGNOSIS OF HYPERTENSION: ICD-10-CM

## 2021-12-01 DIAGNOSIS — I16.0 HYPERTENSIVE URGENCY: ICD-10-CM

## 2021-12-01 DIAGNOSIS — Z01.818 PRE-OPERATIVE GENERAL PHYSICAL EXAMINATION: Primary | ICD-10-CM

## 2021-12-01 DIAGNOSIS — G56.02 CARPAL TUNNEL SYNDROME OF LEFT WRIST: ICD-10-CM

## 2021-12-01 LAB
ERYTHROCYTE [DISTWIDTH] IN BLOOD BY AUTOMATED COUNT: 13.6 % (ref 10–15)
HCT VFR BLD AUTO: 38.8 % (ref 35–47)
HGB BLD-MCNC: 12.6 G/DL (ref 11.7–15.7)
MCH RBC QN AUTO: 34.1 PG (ref 26.5–33)
MCHC RBC AUTO-ENTMCNC: 32.5 G/DL (ref 31.5–36.5)
MCV RBC AUTO: 105 FL (ref 78–100)
PLATELET # BLD AUTO: 175 10E3/UL (ref 150–450)
RBC # BLD AUTO: 3.7 10E6/UL (ref 3.8–5.2)
WBC # BLD AUTO: 3.9 10E3/UL (ref 4–11)

## 2021-12-01 PROCEDURE — 36415 COLL VENOUS BLD VENIPUNCTURE: CPT | Performed by: INTERNAL MEDICINE

## 2021-12-01 PROCEDURE — 80053 COMPREHEN METABOLIC PANEL: CPT | Performed by: INTERNAL MEDICINE

## 2021-12-01 PROCEDURE — 93000 ELECTROCARDIOGRAM COMPLETE: CPT | Performed by: INTERNAL MEDICINE

## 2021-12-01 PROCEDURE — 99215 OFFICE O/P EST HI 40 MIN: CPT | Performed by: INTERNAL MEDICINE

## 2021-12-01 PROCEDURE — 85027 COMPLETE CBC AUTOMATED: CPT | Performed by: INTERNAL MEDICINE

## 2021-12-01 ASSESSMENT — MIFFLIN-ST. JEOR: SCORE: 1336.73

## 2021-12-01 NOTE — PROGRESS NOTES
[address], [address]  [facility] [address]  Phone: [phone]  Primary Provider: [facility], [facility]  Pre-op Performing Provider: ALPA SHAFFER      PREOPERATIVE EVALUATION:  Today's date: 12/1/2021    Yessy Kuhn is a 66 year old female who presents for a preoperative evaluation.    Surgical Information:  Surgery/Procedure: left carpal tunnel release  Surgery Location: [facility] [place]  Surgeon: Dr. Navarro  Surgery Date: 12/13/21  Time of Surgery: TBD  Where patient plans to recover: At home with family  Fax number for surgical facility: [phone]    Type of Anesthesia Anticipated: General    Assessment & Plan     The proposed surgical procedure is considered INTERMEDIATE risk.    Problem List Items Addressed This Visit     None      Visit Diagnoses     Pre-operative general physical examination    -  Primary    Relevant Orders    EKG 12-lead complete w/read - Clinics (Completed)    CBC with platelets (Completed)    Comprehensive metabolic panel (BMP + Alb, Alk Phos, ALT, AST, Total. Bili, TP) (Completed)    Echocardiogram Complete    Carpal tunnel syndrome of left wrist        Elevated blood pressure reading without diagnosis of hypertension        Relevant Orders    EKG 12-lead complete w/read - Clinics (Completed)    CBC with platelets (Completed)    Comprehensive metabolic panel (BMP + Alb, Alk Phos, ALT, AST, Total. Bili, TP) (Completed)    Echocardiogram Complete    24 Hour Blood Pressure Monitor - Adult    Hypertensive urgency        Asymptomatic, patient attributes to whitecoat syndrome.  Blood pressure readings at home are normal.               Risks and Recommendations:  The patient has the following additional risks and recommendations for perioperative complications:   - Consult Hospitalist / IM to assist with post-op medical management  Cardiovascular:   -Echocardiogram was ordered, 24-hour Holter blood pressure monitor was  ordered probably patient will do after surgery, patient had hypertensive urgency in the clinic she attributes to whitecoat syndrome, patient will need to be closely monitor and trend postoperatively for possible uncontrolled hypertension/hypertensive urgency and/or any signs or symptoms of cardiovascular compromise secondary to such.  Patient reports blood pressure at home range between 117-128/53-64 ,1 reading was 168 systolic, patient reports took in the evening after work.  Usually if resting her blood pressure is within normal as she reports.    Medication Instructions:  Patient is on no chronic medications    RECOMMENDATION:  APPROVAL GIVEN to proceed with proposed procedure pending review of diagnostic evaluation.    ADDENDUM: labs reviewed, patient is cleared for surgery, patient will need to be monitored closely for HTN or hypertensive urgency post op, she was advised to call us with BP readings prior to surgery     Total time was 66 minutes, review of records, preop exam and addressing other health conditions including hypertensive urgency      Subjective     HPI related to upcoming procedure: Patient presents for preop clearance she will be undergoing left carpal tunnel surgery she had the right side surgery for carpal tunnel last year without any complications.  No known history of DVT or VTE.  No known cardiac history her blood pressure is elevated in the clinic she attributes to whitecoat syndrome she reports several blood pressure readings at home on 1119 was 117/50 3 in the AM, on 1120 was 128/57 in a.m., on 1121 was 128/66 at 3:30 PM, on 1122 was only 15/60 1 AM, on 1123 was 124/64, on 1127 was 168 systolic after work, then on 1/11/2020 it was 127/63 and 9 AM, on 1129 was 131/63 in a.m.,  Patient denies any dyspnea shortness of breath palpitations presyncope or syncope.  Denies any history of sleep apnea.    Preop Questions 12/1/2021   1. Have you ever had a heart attack or stroke? No   2. Have you  ever had surgery on your heart or blood vessels, such as a stent placement, a coronary artery bypass, or surgery on an artery in your head, neck, heart, or legs? No   3. Do you have chest pain with activity? No   4. Do you have a history of  heart failure? No   5. Do you currently have a cold, bronchitis or symptoms of other infection? No   6. Do you have a cough, shortness of breath, or wheezing? No   7. Do you or anyone in your family have previous history of blood clots? No   8. Do you or does anyone in your family have a serious bleeding problem such as prolonged bleeding following surgeries or cuts? No   9. Have you ever had problems with anemia or been told to take iron pills? No   10. Have you had any abnormal blood loss such as black, tarry or bloody stools, or abnormal vaginal bleeding? No   11. Have you ever had a blood transfusion? No   12. Are you willing to have a blood transfusion if it is medically needed before, during, or after your surgery? Yes   13. Have you or any of your relatives ever had problems with anesthesia? No   14. Do you have sleep apnea, excessive snoring or daytime drowsiness? No   15. Do you have any artifical heart valves or other implanted medical devices like a pacemaker, defibrillator, or continuous glucose monitor? No   16. Do you have artificial joints? YES    17. Are you allergic to latex? YES:        Health Care Directive:  Patient has a Health Care Directive on file      Preoperative Review of :   reviewed - no record of controlled substances prescribed.      Status of Chronic Conditions:  HYPERTENSION/attributable to whitecoat syndrome as per patient- Patient has longstanding history of HTN , currently denies any symptoms referable to elevated blood pressure. Specifically denies chest pain, palpitations, dyspnea, orthopnea, PND or peripheral edema. Blood pressure readings have been in normal range. Current medication regimen is as listed below. Patient denies any  side effects of medication.  Patient reports blood pressure readings at home are normal she is not on any anti-hypertension medication.      Review of Systems  Constitutional, neuro, ENT, endocrine, pulmonary, cardiac, gastrointestinal, genitourinary, musculoskeletal, integument and psychiatric systems are negative, except as otherwise noted.    Patient Active Problem List    Diagnosis Date Noted     S/P total knee arthroplasty 03/11/2019     Priority: Medium     Obesity, Class II, BMI 35-39.9 04/18/2016     Priority: Medium     CARDIOVASCULAR SCREENING; LDL GOAL LESS THAN 160 10/31/2010     Priority: Medium     Herniated cervical intervertebral disc 07/16/2008     Priority: Medium     Mitral valve disorder      Priority: Medium     prolapse  Problem list name updated by automated process. Provider to review        Past Medical History:   Diagnosis Date     Irritable bowel syndrome     constipation     Mitral valve prolapse      PONV (postoperative nausea and vomiting)      Presence of intrauterine contraceptive device   1997    removed 2014     Past Surgical History:   Procedure Laterality Date     ARTHROPLASTY KNEE Right 3/11/2019    Procedure: RIGHT TOTAL KNEE ARTHROPLASTY;  Surgeon: Tate Cohn MD;  Location: [facility] OR     CHOLECYSTECTOMY, OPEN  1991     Current Outpatient Medications   Medication Sig Dispense Refill     acetaminophen (TYLENOL) 325 MG tablet Take 650 mg by mouth 2 times daily as needed for mild pain       ibuprofen (ADVIL/MOTRIN) 800 MG tablet Take 800 mg by mouth every 8 hours as needed for moderate pain       melatonin 5 MG tablet Take 10 mg by mouth nightly as needed for sleep (2 x 5mg = 10mg)         Allergies   Allergen Reactions     Antihistamine [Alkylamines]      Emotional reactions        Social History     Tobacco Use     Smoking status: Never Smoker     Smokeless tobacco: Never Used   Substance Use Topics     Alcohol use: No     Alcohol/week: 0.0 standard drinks     Family  "History   Problem Relation Age of Onset     Respiratory Mother         emphysema - heavy smoker     Cancer Father         bladder and lung at age 50     Diabetes Paternal Grandmother         adult onset     Arthritis Sister      History   Drug Use No         Objective     BP (!) 187/74   Pulse 59   Temp 97.1  F (36.2  C) (Temporal)   Resp 16   Ht 1.638 m (5' 4.5\")   Wt 79.4 kg (175 lb)   LMP 04/17/2007 (Approximate)   SpO2 99%   BMI 29.57 kg/m      Physical Exam    GENERAL APPEARANCE: healthy, alert and no distress     EYES: EOMI, PERRL     HENT: ear canals and TM's normal and nose and mouth without ulcers or lesions     NECK: no adenopathy, no asymmetry, masses, or scars and thyroid normal to palpation     RESP: lungs clear to auscultation - no rales, rhonchi or wheezes     CV: regular rates and rhythm, normal S1 S2, no S3 or S4 and no murmur, click or rub     ABDOMEN:  soft, nontender, no HSM or masses and bowel sounds normal     MS: extremities normal- no gross deformities noted, no evidence of inflammation in joints, FROM in all extremities.     SKIN: no suspicious lesions or rashes     NEURO: Normal strength and tone, sensory exam grossly normal, mentation intact and speech normal     PSYCH: mentation appears normal. and affect normal/bright     LYMPHATICS: No cervical adenopathy    Recent Labs   Lab Test 12/14/20  0937 08/10/20  1141   HGB 12.8 13.1    177    132*   POTASSIUM 4.8 4.4   CR 0.59 0.57        Diagnostics:  Labs pending at this time.  Results will be reviewed when available.   EKG: appears normal, NSR, normal axis, normal intervals, no acute ST/T changes c/w ischemia, no LVH by voltage criteria, unchanged from previous tracings    Revised Cardiac Risk Index (RCRI):  The patient has the following serious cardiovascular risks for perioperative complications:   - No serious cardiac risks = 0 points     RCRI Interpretation: 0 points: Class I (very low risk - 0.4% complication " rate)           Signed Electronically by: Miles Pickard MD  Copy of this evaluation report is provided to requesting physician.

## 2021-12-02 LAB
ALBUMIN SERPL-MCNC: 4 G/DL (ref 3.4–5)
ALP SERPL-CCNC: 130 U/L (ref 40–150)
ALT SERPL W P-5'-P-CCNC: 32 U/L (ref 0–50)
ANION GAP SERPL CALCULATED.3IONS-SCNC: 5 MMOL/L (ref 3–14)
AST SERPL W P-5'-P-CCNC: 24 U/L (ref 0–45)
BILIRUB SERPL-MCNC: 0.5 MG/DL (ref 0.2–1.3)
BUN SERPL-MCNC: 16 MG/DL (ref 7–30)
CALCIUM SERPL-MCNC: 9.4 MG/DL (ref 8.5–10.1)
CHLORIDE BLD-SCNC: 104 MMOL/L (ref 94–109)
CO2 SERPL-SCNC: 30 MMOL/L (ref 20–32)
CREAT SERPL-MCNC: 0.53 MG/DL (ref 0.52–1.04)
GFR SERPL CREATININE-BSD FRML MDRD: >90 ML/MIN/1.73M2
GLUCOSE BLD-MCNC: 91 MG/DL (ref 70–99)
POTASSIUM BLD-SCNC: 4.6 MMOL/L (ref 3.4–5.3)
PROT SERPL-MCNC: 7 G/DL (ref 6.8–8.8)
SODIUM SERPL-SCNC: 139 MMOL/L (ref 133–144)

## 2021-12-04 ENCOUNTER — MYC MEDICAL ADVICE (OUTPATIENT)
Dept: FAMILY MEDICINE | Facility: CLINIC | Age: 66
End: 2021-12-04
Payer: COMMERCIAL

## 2021-12-04 DIAGNOSIS — D75.89 MACROCYTOSIS WITHOUT ANEMIA: Primary | ICD-10-CM

## 2021-12-04 DIAGNOSIS — R03.0 ELEVATED BLOOD PRESSURE READING WITHOUT DIAGNOSIS OF HYPERTENSION: Primary | ICD-10-CM

## 2021-12-06 ENCOUNTER — TELEPHONE (OUTPATIENT)
Dept: FAMILY MEDICINE | Facility: CLINIC | Age: 66
End: 2021-12-06
Payer: COMMERCIAL

## 2021-12-07 NOTE — TELEPHONE ENCOUNTER
See below     1) update with BP readings    2) request for 24 hour blood pressure order - pended     Nissa WEEKS, Triage RN  [facility]

## 2021-12-07 NOTE — TELEPHONE ENCOUNTER
Please remind patient to call us with 2 or more BP readings prior to surgery , had elevated BP in clinic during preop visit, was attributed to white coat syndrome, as per patient.     Thank you for follow up

## 2021-12-07 NOTE — TELEPHONE ENCOUNTER
Orders are in and readings are good but patient seems to have another family practice provider also

## 2022-01-13 ENCOUNTER — MYC MEDICAL ADVICE (OUTPATIENT)
Dept: FAMILY MEDICINE | Facility: CLINIC | Age: 67
End: 2022-01-13

## 2022-01-13 ENCOUNTER — OFFICE VISIT (OUTPATIENT)
Dept: FAMILY MEDICINE | Facility: CLINIC | Age: 67
End: 2022-01-13
Payer: COMMERCIAL

## 2022-01-13 VITALS
TEMPERATURE: 97 F | DIASTOLIC BLOOD PRESSURE: 84 MMHG | HEART RATE: 66 BPM | HEIGHT: 65 IN | RESPIRATION RATE: 16 BRPM | SYSTOLIC BLOOD PRESSURE: 164 MMHG | BODY MASS INDEX: 29.57 KG/M2 | OXYGEN SATURATION: 100 %

## 2022-01-13 DIAGNOSIS — I10 BENIGN ESSENTIAL HYPERTENSION: Primary | ICD-10-CM

## 2022-01-13 DIAGNOSIS — F41.9 ANXIETY: ICD-10-CM

## 2022-01-13 LAB
ANION GAP SERPL CALCULATED.3IONS-SCNC: 1 MMOL/L (ref 3–14)
BUN SERPL-MCNC: 13 MG/DL (ref 7–30)
CALCIUM SERPL-MCNC: 9.1 MG/DL (ref 8.5–10.1)
CHLORIDE BLD-SCNC: 103 MMOL/L (ref 94–109)
CO2 SERPL-SCNC: 32 MMOL/L (ref 20–32)
CREAT SERPL-MCNC: 0.51 MG/DL (ref 0.52–1.04)
GFR SERPL CREATININE-BSD FRML MDRD: >90 ML/MIN/1.73M2
GLUCOSE BLD-MCNC: 92 MG/DL (ref 70–99)
POTASSIUM BLD-SCNC: 4.9 MMOL/L (ref 3.4–5.3)
SODIUM SERPL-SCNC: 136 MMOL/L (ref 133–144)

## 2022-01-13 PROCEDURE — 36415 COLL VENOUS BLD VENIPUNCTURE: CPT | Performed by: INTERNAL MEDICINE

## 2022-01-13 PROCEDURE — 80048 BASIC METABOLIC PNL TOTAL CA: CPT | Performed by: INTERNAL MEDICINE

## 2022-01-13 PROCEDURE — 99214 OFFICE O/P EST MOD 30 MIN: CPT | Performed by: INTERNAL MEDICINE

## 2022-01-13 RX ORDER — HYDROCHLOROTHIAZIDE 12.5 MG/1
12.5 TABLET ORAL DAILY
Qty: 90 TABLET | Refills: 1 | Status: SHIPPED | OUTPATIENT
Start: 2022-01-13 | End: 2022-04-13

## 2022-01-13 RX ORDER — LORAZEPAM 0.5 MG/1
0.5 TABLET ORAL EVERY 6 HOURS PRN
Qty: 20 TABLET | Refills: 0 | Status: SHIPPED | OUTPATIENT
Start: 2022-01-13 | End: 2023-06-19

## 2022-01-13 RX ORDER — UREA 10 %
500 LOTION (ML) TOPICAL DAILY
COMMUNITY

## 2022-01-13 ASSESSMENT — PATIENT HEALTH QUESTIONNAIRE - PHQ9
SUM OF ALL RESPONSES TO PHQ QUESTIONS 1-9: 0
5. POOR APPETITE OR OVEREATING: MORE THAN HALF THE DAYS

## 2022-01-13 ASSESSMENT — ANXIETY QUESTIONNAIRES
7. FEELING AFRAID AS IF SOMETHING AWFUL MIGHT HAPPEN: MORE THAN HALF THE DAYS
6. BECOMING EASILY ANNOYED OR IRRITABLE: SEVERAL DAYS
5. BEING SO RESTLESS THAT IT IS HARD TO SIT STILL: SEVERAL DAYS
IF YOU CHECKED OFF ANY PROBLEMS ON THIS QUESTIONNAIRE, HOW DIFFICULT HAVE THESE PROBLEMS MADE IT FOR YOU TO DO YOUR WORK, TAKE CARE OF THINGS AT HOME, OR GET ALONG WITH OTHER PEOPLE: SOMEWHAT DIFFICULT
2. NOT BEING ABLE TO STOP OR CONTROL WORRYING: MORE THAN HALF THE DAYS
GAD7 TOTAL SCORE: 12
3. WORRYING TOO MUCH ABOUT DIFFERENT THINGS: MORE THAN HALF THE DAYS
1. FEELING NERVOUS, ANXIOUS, OR ON EDGE: MORE THAN HALF THE DAYS

## 2022-01-13 ASSESSMENT — PAIN SCALES - GENERAL: PAINLEVEL: NO PAIN (0)

## 2022-01-13 NOTE — PROGRESS NOTES
"  Assessment & Plan     Benign essential hypertension  She does have hypertension vs prehypertension based on home readings.  Reasonable to start low dose medication.  We discussed amlodipine, which had been recommended by a couple of her friends who are physicians, and hydrochlorothiazide.  Based on side effect profile, she elects to go with hydrochlorothiazide.  She is already doing lifestyle measures we would recommend for hypertension.  - hydrochlorothiazide (HYDRODIURIL) 12.5 MG tablet; Take 1 tablet (12.5 mg) by mouth daily  - Basic metabolic panel  (Ca, Cl, CO2, Creat, Gluc, K, Na, BUN); Future  - Basic metabolic panel  (Ca, Cl, CO2, Creat, Gluc, K, Na, BUN)    Anxiety  Discussed treatment for anxiety can be daily medication and/or as needed medication.  She states she has more intermittent periods of feeling very anxious and overwhelmed rather than daily anxiousness.  Feels like an as needed medication would be more helpful at this point.  Reviewed risks and benefits of benzodiazepine.  When used intermittently these can be very helpful, if using numerous times a week, we should reconsider SSRI.  - LORazepam (ATIVAN) 0.5 MG tablet; Take 1 tablet (0.5 mg) by mouth every 6 hours as needed for anxiety             BMI:   Estimated body mass index is 29.57 kg/m  as calculated from the following:    Height as of this encounter: 1.638 m (5' 4.5\").    Weight as of 12/1/21: 79.4 kg (175 lb).           Return in about 3 weeks (around 2/3/2022) for Nurse only blood pressure check, Lab Work.    Tracy Claudio MD  [facility]   Yessy is a 66 year old who presents for the following health issues     HPI     Yessy is here with a couple concerns.      Elevated blood pressure - she was at a preop visit about 6 weeks ago and her blood pressure was quite elevated (173/86).  The physician recommended an echocardiogram and 24-hour blood pressure monitor and follow-up for her high blood " pressure.  CMP was normal.  She also had a CBC done at that visit which showed slightly low white blood count and .  She was referred to hematology, but states she has had these abnormalities in her blood work for several years.  That visit was pretty overwhelming for her.     She has been checking her blood pressures at home since this visit.  AM readings are typically 130s/60s.  They tend to be a little higher in the evenings, 140s/60s.  She is unaware of a family history of hypertension, but notes her family does not share much about their medical history.  She exercises regularly and follows a healthy diet.  She does not drink any alcohol.    She has also been feeling extremely anxious.  Yessy really has a lot on her plate right now.  She has one daughter who has a baby and is pregnant.  Her other daughter's  unfortunately has glioblastoma with a poor prognosis.  She has been supporting her in many ways including financially.  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  She does not have a particularly supportive spouse.  She has a history of compulsive eating, she is in a 12-step program and has good support from her sponsor.  She practices prayer and meditation as well.  She has periods where she feels very overwhelmed and anxious.  Has never been on a medication for anxiety in the past.    Onset/Duration: Fall 2021  Description: Son in law illness and she is worried about her daughter, and high stress job  Depression (if yes, do PHQ-9): no  Anxiety (if yes, do CALI-7): YES  Accompanying Signs & Symptoms:  Still participating in activities that you used to enjoy: YES  Fatigue: no  Irritability: no  Difficulty concentrating: YES- calming herself down  Changes in appetite: no  Problems with sleep: YES- always had sleeping issues  Heart racing/beating fast: YES  Abnormally elevated, expansive, or irritable mood:  "no  Persistently increased activity or energy: YES  Thoughts of hurting yourself or others: no  History:  Recent stress or major life event: YES- son-in-law diagnosed with Gleoblastoma  Prior depression or anxiety: None  Family history of depression or anxiety: no  Alcohol/drug use: no  Difficulty sleeping: YES  Precipitating or alleviating factors: Exercise makes it better  Therapies tried and outcome: none  CALI-7 SCORE 1/13/2022   Total Score 12       Review of Systems   Const, cv, pulm, psych reviewed,  otherwise negative unless noted above.        Objective    BP (!) 164/84   Pulse 66   Temp 97  F (36.1  C) (Tympanic)   Resp 16   Ht 1.638 m (5' 4.5\")   LMP 04/17/2007 (Approximate)   SpO2 100%   BMI 29.57 kg/m    Body mass index is 29.57 kg/m .  Physical Exam   Gen: well appearing, pleasant woman, no distress  Pulm: breathing comfortably, CTAB, no wheezes or rales  CV: RRR, normal S1 and S2, no murmurs  Ext: 2+ distal pulses, no LE edema                  "

## 2022-01-14 ASSESSMENT — ANXIETY QUESTIONNAIRES: GAD7 TOTAL SCORE: 12

## 2022-01-14 NOTE — TELEPHONE ENCOUNTER
Please see [employer] message and advise.   Nelli JACOBS RN  [facility]     
no

## 2022-02-03 ENCOUNTER — NURSE TRIAGE (OUTPATIENT)
Dept: FAMILY MEDICINE | Facility: CLINIC | Age: 67
End: 2022-02-03

## 2022-02-03 ENCOUNTER — ALLIED HEALTH/NURSE VISIT (OUTPATIENT)
Dept: NURSING | Facility: CLINIC | Age: 67
End: 2022-02-03
Payer: COMMERCIAL

## 2022-02-03 VITALS — SYSTOLIC BLOOD PRESSURE: 150 MMHG | DIASTOLIC BLOOD PRESSURE: 69 MMHG

## 2022-02-03 DIAGNOSIS — I10 BENIGN ESSENTIAL HYPERTENSION: Primary | ICD-10-CM

## 2022-02-03 PROCEDURE — 99207 PR NO CHARGE NURSE ONLY: CPT

## 2022-02-03 RX ORDER — POLYETHYLENE GLYCOL 3350 17 G/17G
1 POWDER, FOR SOLUTION ORAL DAILY
COMMUNITY
End: 2024-01-23

## 2022-02-03 NOTE — TELEPHONE ENCOUNTER
Patient Contact     Called pt and relayed Dr. Claudio's message, see below. She stated her understanding and had no further questions or concerns at this time.     Nelli JACOBS RN  [facility]

## 2022-02-03 NOTE — TELEPHONE ENCOUNTER
Nurse Triage SBAR    Is this a 2nd Level Triage? NO    Situation : Pt has been experiencing HTN, started Hydrochlorothiazide about 3 weeks ago. Seen in the clinic today for RN BP check, was elevated at 150/69.     Background : Pt has been tolerating medication, not missing any doses, and not experiencing any symptoms currently. She notes she has also been experiencing stress from her job and family member with illness recently.     Assessment : see below    (See information below for more triage details.)    Recommendation : Routing to provider for recommendation on See RN BP visit from 2/3/22. Do you want to change her current dose of Hydrochlorothiazide? Pt had question regarding her travel to [place] next week Friday. She wants to confirm she is still able to travel d/t BP and medication (if this is to change).     Protocol Recommended Disposition: Routine office follow-up appointment       Reason for Disposition    Systolic BP >= 130 OR Diastolic >= 80, and is taking BP medications    Additional Information    Negative: Sounds like a life-threatening emergency to the triager    Negative: Pregnant > 20 weeks or postpartum (< 6 weeks after delivery) and new hand or face swelling    Negative: Pregnant > 20 weeks and BP > 140/90    Negative: Systolic BP >= 160 OR Diastolic >= 100, and any cardiac or neurologic symptoms (e.g., chest pain, difficulty breathing, unsteady gait, blurred vision)    Negative: Patient sounds very sick or weak to the triager    Negative: BP Systolic BP >= 140 OR Diastolic >= 90 and postpartum (from 0 to 6 weeks after delivery)    Negative: Systolic BP >= 180 OR Diastolic >= 110, and missed most recent dose of blood pressure medication    Negative: Systolic BP >= 180 OR Diastolic >= 110    Negative: Patient wants to be seen    Negative: Ran out of BP medications    Negative: Taking BP medications and feels is having side effects (e.g., impotence, cough, dizziness)    Negative: Systolic BP >=  "160 OR Diastolic >= 100    Negative: Systolic BP >= 130 OR Diastolic >= 80, and pregnant    Answer Assessment - Initial Assessment Questions  1. BLOOD PRESSURE: \"What is the blood pressure?\" \"Did you take at least two measurements 5 minutes apart?\"        150/69, machine was 154/67     2. ONSET: \"When did you take your blood pressure?\"        About 1PM today    3. HOW: \"How did you obtain the blood pressure?\" (e.g., visiting nurse, automatic home BP monitor)        Nurse visit and home monitor.     4. HISTORY: \"Do you have a history of high blood pressure?\"        Recently started Hydrochlorothiazide for elevated BP about 3 weeks ago.     5. MEDICATIONS: \"Are you taking any medications for blood pressure?\" \"Have you missed any doses recently?\"        Hydrochlorothiazide, no missed doses.     6. OTHER SYMPTOMS: \"Do you have any symptoms?\" (e.g., headache, chest pain, blurred vision, difficulty breathing, weakness)        No.     7. PREGNANCY: \"Is there any chance you are pregnant?\" \"When was your last menstrual period?\"        NA    Protocols used: HIGH BLOOD PRESSURE-A-OH    SEE WITHIN 2 WEEKS IN OFFICE: You need to be examined. Let me give you an appointment.      CALL BACK IF:  * Headache, blurred vision, difficulty talking, or difficulty walking occurs  * Chest pain or difficulty breathing occurs  * You want to go into the office for a blood pressure check  * You become worse        Patient/Caregiver understands and will follow care advice? Other, see ellen JACOBS RN  [facility]   "

## 2022-02-03 NOTE — PROGRESS NOTES
Yessy Kuhn is a 66 year old patient who comes in today for a Blood Pressure check because of new medication. Hydrochlorothiazide.   Vital Signs as repeated by /69, home machine: 154/67   Patient is taking medication as prescribed  Patient is tolerating medications well.  Patient is monitoring Blood Pressure at home.  Average readings if yes are 120-130's/50-60's. She takes this in the morning when she gets up. It seems to be more elevated up to 160's if she takes it after work.   Current complaints: none  Disposition:  See telephone triage encounter from 2/3/22. Per protocol pt should be seen in 2 weeks. Routing to Dr. Claudio to confirm if she wants to see the pt again or if she wants to change current dose of medication.     Nelli JACOBS RN  [facility]

## 2022-02-03 NOTE — TELEPHONE ENCOUNTER
It sounds like they are pretty much at goal at home.  So I would just continue the current medication and dose for now.  Okay to check a couple times a week. Let me know if 50% or so of the readings are above 135/85, then we should increase the dose.     Tracy Claudio MD

## 2022-02-12 ENCOUNTER — HEALTH MAINTENANCE LETTER (OUTPATIENT)
Age: 67
End: 2022-02-12

## 2022-04-13 ENCOUNTER — OFFICE VISIT (OUTPATIENT)
Dept: FAMILY MEDICINE | Facility: CLINIC | Age: 67
End: 2022-04-13
Payer: COMMERCIAL

## 2022-04-13 VITALS
HEART RATE: 71 BPM | WEIGHT: 174 LBS | BODY MASS INDEX: 29.41 KG/M2 | TEMPERATURE: 97.2 F | SYSTOLIC BLOOD PRESSURE: 150 MMHG | DIASTOLIC BLOOD PRESSURE: 78 MMHG | RESPIRATION RATE: 14 BRPM | OXYGEN SATURATION: 100 %

## 2022-04-13 DIAGNOSIS — G56.02 CARPAL TUNNEL SYNDROME OF LEFT WRIST: ICD-10-CM

## 2022-04-13 DIAGNOSIS — Z01.818 PREOP GENERAL PHYSICAL EXAM: Primary | ICD-10-CM

## 2022-04-13 DIAGNOSIS — I10 BENIGN ESSENTIAL HYPERTENSION: ICD-10-CM

## 2022-04-13 LAB
CREAT SERPL-MCNC: 0.48 MG/DL (ref 0.52–1.04)
GFR SERPL CREATININE-BSD FRML MDRD: >90 ML/MIN/1.73M2
HGB BLD-MCNC: 12.9 G/DL (ref 11.7–15.7)
POTASSIUM BLD-SCNC: 3.8 MMOL/L (ref 3.4–5.3)

## 2022-04-13 PROCEDURE — 85018 HEMOGLOBIN: CPT | Performed by: INTERNAL MEDICINE

## 2022-04-13 PROCEDURE — 82565 ASSAY OF CREATININE: CPT | Performed by: INTERNAL MEDICINE

## 2022-04-13 PROCEDURE — 99214 OFFICE O/P EST MOD 30 MIN: CPT | Performed by: INTERNAL MEDICINE

## 2022-04-13 PROCEDURE — 36415 COLL VENOUS BLD VENIPUNCTURE: CPT | Performed by: INTERNAL MEDICINE

## 2022-04-13 PROCEDURE — 84132 ASSAY OF SERUM POTASSIUM: CPT | Performed by: INTERNAL MEDICINE

## 2022-04-13 RX ORDER — HYDROCHLOROTHIAZIDE 12.5 MG/1
12.5 TABLET ORAL DAILY
Qty: 90 TABLET | Refills: 1 | Status: SHIPPED | OUTPATIENT
Start: 2022-04-13 | End: 2022-04-13

## 2022-04-13 RX ORDER — HYDROCHLOROTHIAZIDE 12.5 MG/1
25 TABLET ORAL DAILY
Qty: 180 TABLET | Refills: 1 | Status: SHIPPED | OUTPATIENT
Start: 2022-04-13 | End: 2022-06-16

## 2022-04-13 ASSESSMENT — PAIN SCALES - GENERAL: PAINLEVEL: NO PAIN (0)

## 2022-04-13 NOTE — PATIENT INSTRUCTIONS
Preparing for Your Surgery  Getting started  A nurse will call you to review your health history and instructions. They will give you an arrival time based on your scheduled surgery time. Please be ready to share:    Your doctor's clinic name and phone number    Your medical, surgical and anesthesia history    A list of allergies and sensitivities    A list of medicines, including herbal treatments and over-the-counter drugs    Whether the patient has a legal guardian (ask how to send us the papers in advance)  Please tell us if you're pregnant--or if there's any chance you might be pregnant. Some surgeries may injure a fetus (unborn baby), so they require a pregnancy test. Surgeries that are safe for a fetus don't always need a test, and you can choose whether to have one.   If you have a child who's having surgery, please ask for a copy of Preparing for Your Child's Surgery.    Preparing for surgery    Within 30 days of surgery: Have a pre-op exam (sometimes called an H&P, or History and Physical). This can be done at a clinic or pre-operative center.  ? If you're having a , you may not need this exam. Talk to your care team.    At your pre-op exam, talk to your care team about all medicines you take. If you need to stop any medicines before surgery, ask when to start taking them again.  ? We do this for your safety. Many medicines can make you bleed too much during surgery. Some change how well surgery (anesthesia) drugs work.    Call your insurance company to let them know you're having surgery. (If you don't have insurance, call [phone].)    Call your clinic if there's any change in your health. This includes signs of a cold or flu (sore throat, runny nose, cough, rash, fever). It also includes a scrape or scratch near the surgery site.    If you have questions on the day of surgery, call your hospital or surgery center.  COVID testing  You may need to be tested for COVID-19 before having  surgery. If so, your surgical team will give you instructions for scheduling this test, separate from your preoperative history and physical.  Eating and drinking guidelines  For your safety: Unless your surgeon tells you otherwise, follow the guidelines below.    Eat and drink as usual until 8 hours before surgery. After that, no food or milk.    Drink clear liquids until 2 hours before surgery. These are liquids you can see through, like water, Gatorade and Propel Water. You may also have black coffee and tea (no cream or milk).    Nothing by mouth within 2 hours of surgery. This includes gum, candy and breath mints.    If you drink alcohol: Stop drinking it the night before surgery.    If your care team tells you to take medicine on the morning of surgery, it's okay to take it with a sip of water.  Preventing infection    Shower or bathe the night before and morning of your surgery. Follow the instructions your clinic gave you. (If no instructions, use regular soap.)    Don't shave or clip hair near your surgery site. We'll remove the hair if needed.    Don't smoke or vape the morning of surgery. You may chew nicotine gum up to 2 hours before surgery. A nicotine patch is okay.  ? Note: Some surgeries require you to completely quit smoking and nicotine. Check with your surgeon.    Your care team will make every effort to keep you safe from infection. We will:  ? Clean our hands often with soap and water (or an alcohol-based hand rub).  ? Clean the skin at your surgery site with a special soap that kills germs.  ? Give you a special gown to keep you warm. (Cold raises the risk of infection.)  ? Wear special hair covers, masks, gowns and gloves during surgery.  ? Give antibiotic medicine, if prescribed. Not all surgeries need antibiotics.  What to bring on the day of surgery    Photo ID and insurance card    Copy of your health care directive, if you have one    Glasses and hearing aides (bring cases)  ? You can't  wear contacts during surgery    Inhaler and eye drops, if you use them (tell us about these when you arrive)    CPAP machine or breathing device, if you use them    A few personal items, if spending the night    If you have . . .  ? A pacemaker, ICD (cardiac defibrillator) or other implant: Bring the ID card.  ? An implanted stimulator: Bring the remote control.  ? A legal guardian: Bring a copy of the certified (court-stamped) guardianship papers.  Please remove any jewelry, including body piercings. Leave jewelry and other valuables at home.  If you're going home the day of surgery    You must have a responsible adult drive you home. They should stay with you overnight as well.    If you don't have someone to stay with you, and you aren't safe to go home alone, we may keep you overnight. Insurance often won't pay for this.  After surgery  If it's hard to control your pain or you need more pain medicine, please call your surgeon's office.  Questions?   If you have any questions for your care team, list them here: _________________________________________________________________________________________________________________________________________________________________________ ____________________________________ ____________________________________ ____________________________________  For informational purposes only. Not to replace the advice of your health care provider. Copyright   2003, 2019 [facility]. All rights reserved. Clinically reviewed by Gabby Soni MD. [employer] [id number] - REV 07/21.

## 2022-04-13 NOTE — PROGRESS NOTES
[facility]  [address] [address]  Phone: [phone]  Primary Provider: [facility]  Pre-op Performing Provider: RIDDHI RODRIGUEZ      PREOPERATIVE EVALUATION:  Today's date: 4/13/2022    Yessy Kuhn is a 67 year old female who presents for a preoperative evaluation.    Surgical Information:  Surgery/Procedure: left carpal tunnel   Surgery Location: [facility]   Surgeon:  Dr Navarro   Surgery Date: 4/18/2022  Time of Surgery: unknown   Where patient plans to recover: At home with family  Fax number for surgical facility: [phone]    Type of Anesthesia Anticipated: General    Assessment & Plan     The proposed surgical procedure is considered INTERMEDIATE risk.    Preop general physical exam  - Potassium; Future  - Creatinine; Future  - Hemoglobin; Future  - Potassium  - Creatinine  - Hemoglobin    Carpal tunnel syndrome of left wrist    Benign essential hypertension  BPs are good at home, but high in clinic.  She would like to try increasing dose from 12.5mg to 25mg, follow home BPs and monitor for lightheadedness.   - hydrochlorothiazide (HYDRODIURIL) 12.5 MG tablet; Take 2 tablets (25 mg) by mouth daily             Medication Instructions:  Patient is to take all scheduled medications on the day of surgery    RECOMMENDATION:  APPROVAL GIVEN to proceed with proposed procedure, without further diagnostic evaluation.              Subjective     HPI related to upcoming procedure: Yessy has long-standing carpal tunnel.  She has a lot of pain and numbness.  The surgery was supposed to be a few months ago but was postponed due to her elevated blood pressure.  She is now on hydrochlorothiazide 12.5 mg.  Her blood pressures at home over the last month have been 120s/60s.      Preop Questions 4/13/2022   1. Have you ever had a heart attack or stroke? No   2. Have you ever had surgery on your heart or blood vessels, such as a  stent placement, a coronary artery bypass, or surgery on an artery in your head, neck, heart, or legs? No   3. Do you have chest pain with activity? No   4. Do you have a history of  heart failure? No   5. Do you currently have a cold, bronchitis or symptoms of other infection? No   6. Do you have a cough, shortness of breath, or wheezing? No   7. Do you or anyone in your family have previous history of blood clots? No   8. Do you or does anyone in your family have a serious bleeding problem such as prolonged bleeding following surgeries or cuts? No   9. Have you ever had problems with anemia or been told to take iron pills? No   10. Have you had any abnormal blood loss such as black, tarry or bloody stools, or abnormal vaginal bleeding? No   11. Have you ever had a blood transfusion? No   12. Are you willing to have a blood transfusion if it is medically needed before, during, or after your surgery? Yes   13. Have you or any of your relatives ever had problems with anesthesia? No   14. Do you have sleep apnea, excessive snoring or daytime drowsiness? No   15. Do you have any artifical heart valves or other implanted medical devices like a pacemaker, defibrillator, or continuous glucose monitor? No   16. Do you have artificial joints? YES -    17. Are you allergic to latex? YES:        Health Care Directive:  Patient has a Health Care Directive on file      Preoperative Review of :   reviewed - no record of controlled substances prescribed.        Status of Chronic Conditions:  See problem list for active medical problems.  Problems all longstanding and stable, except as noted/documented.  See ROS for pertinent symptoms related to these conditions.      Review of Systems  Const, heent, cv, pulm, gi, msk, heme reviewed,  otherwise negative unless noted above.      Patient Active Problem List    Diagnosis Date Noted     Benign essential hypertension 01/13/2022     Priority: Medium     S/P total knee arthroplasty  03/11/2019     Priority: Medium     Obesity, Class II, BMI 35-39.9 04/18/2016     Priority: Medium     CARDIOVASCULAR SCREENING; LDL GOAL LESS THAN 160 10/31/2010     Priority: Medium     Herniated cervical intervertebral disc 07/16/2008     Priority: Medium     Mitral valve disorder      Priority: Medium     prolapse  Problem list name updated by automated process. Provider to review        Past Medical History:   Diagnosis Date     Irritable bowel syndrome     constipation     Mitral valve prolapse      PONV (postoperative nausea and vomiting)      Presence of intrauterine contraceptive device   1997    removed 2014     Past Surgical History:   Procedure Laterality Date     ARTHROPLASTY KNEE Right 3/11/2019    Procedure: RIGHT TOTAL KNEE ARTHROPLASTY;  Surgeon: Tate Cohn MD;  Location:  OR     CHOLECYSTECTOMY, OPEN  1991     Current Outpatient Medications   Medication Sig Dispense Refill     acetaminophen (TYLENOL) 325 MG tablet Take 650 mg by mouth 2 times daily as needed for mild pain       calcium carbonate 600 mg-vitamin D 400 units (CALTRATE) 600-400 MG-UNIT per tablet Take 1 tablet by mouth 2 times daily       cyanocobalamin (VITAMIN B-12) 500 MCG tablet Take 500 mcg by mouth daily       hydrochlorothiazide (HYDRODIURIL) 12.5 MG tablet Take 1 tablet (12.5 mg) by mouth daily 90 tablet 1     LORazepam (ATIVAN) 0.5 MG tablet Take 1 tablet (0.5 mg) by mouth every 6 hours as needed for anxiety 20 tablet 0     melatonin 5 MG tablet Take 10 mg by mouth nightly as needed for sleep (2 x 5mg = 10mg)       polyethylene glycol (MIRALAX) 17 g packet Take 1 packet by mouth daily       ibuprofen (ADVIL/MOTRIN) 800 MG tablet Take 800 mg by mouth every 8 hours as needed for moderate pain (Patient not taking: No sig reported)         Allergies   Allergen Reactions     Antihistamine [Alkylamines]      Emotional reactions     Diphenhydramine Unknown     Notes: ANXIETY;     Latex Unknown        Social History      Tobacco Use     Smoking status: Never Smoker     Smokeless tobacco: Never Used   Substance Use Topics     Alcohol use: No     Alcohol/week: 0.0 standard drinks         History   Drug Use No         Objective     BP (!) 150/78   Pulse 71   Temp 97.2  F (36.2  C) (Tympanic)   Resp 14   Wt 78.9 kg (174 lb)   LMP 04/17/2007 (Approximate)   SpO2 100%   BMI 29.41 kg/m      Physical Exam  Gen: well appearing, pleasant woman, no distress  HEENT: PERRL, sclera nonicteric, MMM  Neck: supple, no LAD  Pulm: breathing comfortably, CTAB, no wheezes or rales  CV: RRR, normal S1 and S2, no murmurs  Abd: BS present, soft, nontender, nondistended  Ext: 2+ distal pulses, no LE edema      Recent Labs   Lab Test 01/13/22  0931 12/01/21  1226 12/14/20  0937   HGB  --  12.6 12.8   PLT  --  175 161    139 138   POTASSIUM 4.9 4.6 4.8   CR 0.51* 0.53 0.59        Diagnostics:  Recent Results (from the past 24 hour(s))   Potassium    Collection Time: 04/13/22 10:06 AM   Result Value Ref Range    Potassium 3.8 3.4 - 5.3 mmol/L   Creatinine    Collection Time: 04/13/22 10:06 AM   Result Value Ref Range    Creatinine 0.48 (L) 0.52 - 1.04 mg/dL    GFR Estimate >90 >60 mL/min/1.73m2   Hemoglobin    Collection Time: 04/13/22 10:06 AM   Result Value Ref Range    Hemoglobin 12.9 11.7 - 15.7 g/dL      No EKG this visit, completed 12/4/2021 and was normal.    Revised Cardiac Risk Index (RCRI):  The patient has the following serious cardiovascular risks for perioperative complications:   - No serious cardiac risks = 0 points     RCRI Interpretation: 0 points: Class I (very low risk - 0.4% complication rate)           Signed Electronically by: Tracy Claudio MD  Copy of this evaluation report is provided to requesting physician.

## 2022-06-04 ENCOUNTER — HEALTH MAINTENANCE LETTER (OUTPATIENT)
Age: 67
End: 2022-06-04

## 2022-06-16 ENCOUNTER — OFFICE VISIT (OUTPATIENT)
Dept: FAMILY MEDICINE | Facility: CLINIC | Age: 67
End: 2022-06-16
Payer: COMMERCIAL

## 2022-06-16 VITALS
BODY MASS INDEX: 29.09 KG/M2 | DIASTOLIC BLOOD PRESSURE: 80 MMHG | RESPIRATION RATE: 16 BRPM | OXYGEN SATURATION: 100 % | TEMPERATURE: 98 F | HEART RATE: 56 BPM | WEIGHT: 170.4 LBS | SYSTOLIC BLOOD PRESSURE: 144 MMHG | HEIGHT: 64 IN

## 2022-06-16 DIAGNOSIS — I10 BENIGN ESSENTIAL HYPERTENSION: Primary | ICD-10-CM

## 2022-06-16 DIAGNOSIS — M72.2 PLANTAR FASCIITIS: ICD-10-CM

## 2022-06-16 PROCEDURE — 99214 OFFICE O/P EST MOD 30 MIN: CPT | Performed by: PHYSICIAN ASSISTANT

## 2022-06-16 PROCEDURE — 80048 BASIC METABOLIC PNL TOTAL CA: CPT | Performed by: PHYSICIAN ASSISTANT

## 2022-06-16 PROCEDURE — 36415 COLL VENOUS BLD VENIPUNCTURE: CPT | Performed by: PHYSICIAN ASSISTANT

## 2022-06-16 RX ORDER — LISINOPRIL/HYDROCHLOROTHIAZIDE 10-12.5 MG
1 TABLET ORAL DAILY
Qty: 90 TABLET | Refills: 1 | Status: SHIPPED | OUTPATIENT
Start: 2022-06-16 | End: 2022-11-28

## 2022-06-16 ASSESSMENT — PAIN SCALES - GENERAL: PAINLEVEL: NO PAIN (0)

## 2022-06-16 NOTE — PROGRESS NOTES
"  Assessment & Plan         Benign essential hypertension  She would benefit from starting lisinopril-hydrochlorothiazide for better BP control.  He will RTC in 2-4 weeks for BP check  - lisinopril-hydrochlorothiazide (ZESTORETIC) 10-12.5 MG tablet; Take 1 tablet by mouth daily  - Basic metabolic panel  (Ca, Cl, CO2, Creat, Gluc, K, Na, BUN); Future  - Basic metabolic panel  (Ca, Cl, CO2, Creat, Gluc, K, Na, BUN)    Plantar fasciitis  Exercises discussed, recommend comfortable shoes and NSAIDs PRN  - Orthopedic  Referral; Future         BMI:   Estimated body mass index is 28.81 kg/m  as calculated from the following:    Height as of this encounter: 1.638 m (5' 4.49\").    Weight as of this encounter: 77.3 kg (170 lb 6.4 oz).       Return in about 4 weeks (around 7/14/2022) for BP Recheck.    Benji Damon PA-C  [facility]    Brian   Yessy is a 67 year old        History of Present Illness       Hypertension: She presents for follow up of hypertension.  She does check blood pressure  regularly outside of the clinic. Outside blood pressures have been over 140/90. She follows a low salt diet.         Yessy presents to the clinic for evaluation of HTN.  She was last seen 2 months ago by her PCP, Dr. Claudio who has left the clinic.  At that time, she was started on 12.5 mg of hydrochlorothiazide and then this was increased to 25 mg.  She has been checking hr blood pressures at home, these have been b/w 130/80s-140/90.  She increased her hydrochlorothiazide on her own to 37.5 mg. She is not having SE but is not noticing an improvement in her blood pressure at this higher dose.  Would like recommendations.       She hs been experiencing pain in the plantar aspect of both feet along her arches for several years.  Would like referral to podiatry today    Review of Systems   Constitutional, HEENT, cardiovascular, pulmonary, gi and gu systems are negative, except as otherwise " "noted.      Objective    BP (!) 144/80   Pulse 56   Temp 98  F (36.7  C) (Temporal)   Resp 16   Ht 1.638 m (5' 4.49\")   Wt 77.3 kg (170 lb 6.4 oz)   LMP 04/17/2007 (Approximate)   SpO2 100%   BMI 28.81 kg/m    Body mass index is 28.81 kg/m .  Physical Exam   GENERAL: healthy, alert and no distress  RESP: lungs clear to auscultation - no rales, rhonchi or wheezes  CV: regular rate and rhythm, normal S1 S2, no S3 or S4, no murmur, click or rub, no peripheral edema and peripheral pulses strong  PSYCH: mentation appears normal, affect normal/bright                .  ..  "

## 2022-06-17 LAB
ANION GAP SERPL CALCULATED.3IONS-SCNC: 6 MMOL/L (ref 3–14)
BUN SERPL-MCNC: 16 MG/DL (ref 7–30)
CALCIUM SERPL-MCNC: 9.2 MG/DL (ref 8.5–10.1)
CHLORIDE BLD-SCNC: 96 MMOL/L (ref 94–109)
CO2 SERPL-SCNC: 28 MMOL/L (ref 20–32)
CREAT SERPL-MCNC: 0.46 MG/DL (ref 0.52–1.04)
GFR SERPL CREATININE-BSD FRML MDRD: >90 ML/MIN/1.73M2
GLUCOSE BLD-MCNC: 85 MG/DL (ref 70–99)
POTASSIUM BLD-SCNC: 4.2 MMOL/L (ref 3.4–5.3)
SODIUM SERPL-SCNC: 130 MMOL/L (ref 133–144)

## 2022-06-20 NOTE — RESULT ENCOUNTER NOTE
Yessy-  Here are your recent results.     Your labs look normal with the exception of a low sodium level ( 130).  This could be caused by your blood pressure medication.  We should recheck this level in 2-4 weeks after you start the new medication to ensure that it normalizes.  Please schedule a lab only for 2-4 weeks from now.     Benji Damon PA-C

## 2022-06-21 ENCOUNTER — DOCUMENTATION ONLY (OUTPATIENT)
Dept: LAB | Facility: CLINIC | Age: 67
End: 2022-06-21

## 2022-06-21 DIAGNOSIS — Z13.220 SCREENING FOR HYPERLIPIDEMIA: Primary | ICD-10-CM

## 2022-06-21 DIAGNOSIS — E87.1 HYPONATREMIA: ICD-10-CM

## 2022-06-21 NOTE — PROGRESS NOTES
"Patient is scheduled to be seen in our lab 7/7/2022.      Appointment notes indicate \"RECHECK SODIUM LEVELS\".      NO current FUTURE or STANDING ORDERS have been placed.  Please place current FUTURE or STANDING orders as needed.      Note that orders have a maximum duration of one (1) year.    Thank you.  Please call if you have any questions.     "

## 2022-07-07 ENCOUNTER — ALLIED HEALTH/NURSE VISIT (OUTPATIENT)
Dept: NURSING | Facility: CLINIC | Age: 67
End: 2022-07-07
Payer: COMMERCIAL

## 2022-07-07 ENCOUNTER — NURSE TRIAGE (OUTPATIENT)
Dept: FAMILY MEDICINE | Facility: CLINIC | Age: 67
End: 2022-07-07

## 2022-07-07 ENCOUNTER — LAB (OUTPATIENT)
Dept: LAB | Facility: CLINIC | Age: 67
End: 2022-07-07
Payer: COMMERCIAL

## 2022-07-07 VITALS — SYSTOLIC BLOOD PRESSURE: 145 MMHG | DIASTOLIC BLOOD PRESSURE: 68 MMHG

## 2022-07-07 DIAGNOSIS — E87.1 HYPONATREMIA: ICD-10-CM

## 2022-07-07 DIAGNOSIS — I10 BENIGN ESSENTIAL HYPERTENSION: Primary | ICD-10-CM

## 2022-07-07 DIAGNOSIS — G47.00 INSOMNIA, UNSPECIFIED: ICD-10-CM

## 2022-07-07 PROCEDURE — 99207 PR NO CHARGE NURSE ONLY: CPT

## 2022-07-07 PROCEDURE — 36415 COLL VENOUS BLD VENIPUNCTURE: CPT

## 2022-07-07 PROCEDURE — 84295 ASSAY OF SERUM SODIUM: CPT

## 2022-07-07 NOTE — TELEPHONE ENCOUNTER
Pt requesting refill of Ambien to help with sleep. She states she took this in the past and has difficulties falling and staying asleep. Offered to schedule pt for appointment but pt wanted message sent to PCP with request. Order pended, not on current list.     Nelli JACOBS RN  [facility]

## 2022-07-07 NOTE — TELEPHONE ENCOUNTER
Nurse Triage SBAR    Is this a 2nd Level Triage? NO    Situation: Pt had nurse BP check in clinic. Home machine 152/64, and nurse check 145/68. Pt is completely asymptomatic.     Background: Pt on Lisinopril-Hydrochlorothiazide and has not missed doses. She had not been taking BP at home, she was concerned machine was not accurate but will start checking at home again.     Assessment: see below    Protocol Recommended Disposition:   See Within 2 Weeks In Office    Recommendation: Pt last saw provider on 6/16/22, do you want to see pt again? Do you want to adjust medication? Please review/advise.     Routed to provider    Does the patient meet one of the following criteria for ADS visit consideration? 16+ years old, with an [facility] PCP     TIP  Providers, please consider if this condition is appropriate for management at one of our Acute and Diagnostic Services sites.     If patient is a good candidate, please use dotphrase <dot>triageresponse and select Refer to ADS to document.    Reason for Disposition    Systolic BP >= 130 OR Diastolic >= 80, and is taking BP medications    Additional Information    Negative: Sounds like a life-threatening emergency to the triager    Negative: Pregnant > 20 weeks or postpartum (< 6 weeks after delivery) and new hand or face swelling    Negative: Pregnant > 20 weeks and BP > 140/90    Negative: Systolic BP >= 160 OR Diastolic >= 100, and any cardiac or neurologic symptoms (e.g., chest pain, difficulty breathing, unsteady gait, blurred vision)    Negative: Patient sounds very sick or weak to the triager    Negative: BP Systolic BP >= 140 OR Diastolic >= 90 and postpartum (from 0 to 6 weeks after delivery)    Negative: Systolic BP >= 180 OR Diastolic >= 110, and missed most recent dose of blood pressure medication    Negative: Systolic BP >= 180 OR Diastolic >= 110    Negative: Patient wants to be seen    Negative: Ran out of BP medications    Negative: Taking BP medications and feels  "is having side effects (e.g., impotence, cough, dizziness)    Negative: Systolic BP >= 160 OR Diastolic >= 100    Negative: Systolic BP >= 130 OR Diastolic >= 80, and pregnant    Answer Assessment - Initial Assessment Questions  1. BLOOD PRESSURE: \"What is the blood pressure?\" \"Did you take at least two measurements 5 minutes apart?\"        145/68 nurse check, 152/64 home machine that pt brought with her.     2. ONSET: \"When did you take your blood pressure?\"        About 10:30 AM    3. HOW: \"How did you obtain the blood pressure?\" (e.g., visiting nurse, automatic home BP monitor)        Nurse check and home machine check.     4. HISTORY: \"Do you have a history of high blood pressure?\"        Yes    5. MEDICATIONS: \"Are you taking any medications for blood pressure?\" \"Have you missed any doses recently?\"        Yes, Lisinopril-Hydrochlorothiazide, no missed doses.     6. OTHER SYMPTOMS: \"Do you have any symptoms?\" (e.g., headache, chest pain, blurred vision, difficulty breathing, weakness)        No    7. PREGNANCY: \"Is there any chance you are pregnant?\" \"When was your last menstrual period?\"        NA    Protocols used: HIGH BLOOD PRESSURE-A-DANY JACOBS RN  [facility]   "

## 2022-07-07 NOTE — PROGRESS NOTES
Yessy Kuhn is a 67 year old patient who comes in today for a Blood Pressure check because of medication change.  Vital Signs as repeated by /68  Patient is taking medication as prescribed  Patient is tolerating medications well.  Patient is not monitoring Blood Pressure at home.  Pt is concerned machine is not accurate. She will take BP and readings will be very different after another. She did bring it in and reading was: 152/64   Current complaints: none  Disposition:  patient to continue with the same medication, please see separate telephone triage encounter routed to PCP from 7/7/22.     Nelli JACOBS RN  [facility]

## 2022-07-07 NOTE — TELEPHONE ENCOUNTER
Patient should continue to check BP at home over the next 2 weeks  If persistently elevated we will increase her dose

## 2022-07-08 LAB — SODIUM SERPL-SCNC: 132 MMOL/L (ref 133–144)

## 2022-07-10 ENCOUNTER — MYC MEDICAL ADVICE (OUTPATIENT)
Dept: FAMILY MEDICINE | Facility: CLINIC | Age: 67
End: 2022-07-10

## 2022-07-10 DIAGNOSIS — G47.00 INSOMNIA, UNSPECIFIED: ICD-10-CM

## 2022-07-10 DIAGNOSIS — G47.00 INSOMNIA, UNSPECIFIED TYPE: Primary | ICD-10-CM

## 2022-07-12 NOTE — RESULT ENCOUNTER NOTE
Yessy-  Here are your recent results.     Your sodium has improved but is still slightly low since your last visit. This is due to your hydrochlorothiazide medication.  Please return in 1 month again to have this rechecked at a lab only visit.  If it has not normalized now that you are on a lower dose of medication, we can plan to increase the lisinopril and stop the hydrochlorothiazide.  Benji

## 2022-07-14 RX ORDER — ZOLPIDEM TARTRATE 12.5 MG/1
12.5 TABLET, FILM COATED, EXTENDED RELEASE ORAL
Qty: 30 TABLET | Refills: 0 | Status: CANCELLED | OUTPATIENT
Start: 2022-07-14

## 2022-07-14 RX ORDER — ZOLPIDEM TARTRATE 5 MG/1
5 TABLET ORAL
Qty: 30 TABLET | Refills: 0 | Status: SHIPPED | OUTPATIENT
Start: 2022-07-14 | End: 2023-06-19

## 2022-07-14 NOTE — TELEPHONE ENCOUNTER
ambien refilled x 1.  This should not be taken with alcohol or with her ativan and is recommended only for PRN use

## 2022-07-15 NOTE — TELEPHONE ENCOUNTER
Patient has read MYC and has been in contact with RN and MA. Nothing scheduled as of yet.    Nadia CAREY CMA

## 2022-07-19 RX ORDER — ZOLPIDEM TARTRATE 12.5 MG/1
TABLET, FILM COATED, EXTENDED RELEASE ORAL
Qty: 4 TABLET | Refills: 0 | OUTPATIENT
Start: 2022-07-19

## 2022-08-25 ENCOUNTER — LAB (OUTPATIENT)
Dept: LAB | Facility: CLINIC | Age: 67
End: 2022-08-25
Payer: COMMERCIAL

## 2022-08-25 DIAGNOSIS — Z13.220 SCREENING FOR HYPERLIPIDEMIA: ICD-10-CM

## 2022-08-25 LAB
CHOLEST SERPL-MCNC: 227 MG/DL
FASTING STATUS PATIENT QL REPORTED: YES
HDLC SERPL-MCNC: 95 MG/DL
LDLC SERPL CALC-MCNC: 121 MG/DL
NONHDLC SERPL-MCNC: 132 MG/DL
TRIGL SERPL-MCNC: 53 MG/DL

## 2022-08-25 PROCEDURE — 36415 COLL VENOUS BLD VENIPUNCTURE: CPT

## 2022-08-25 PROCEDURE — 80061 LIPID PANEL: CPT

## 2022-08-26 ENCOUNTER — MYC MEDICAL ADVICE (OUTPATIENT)
Dept: FAMILY MEDICINE | Facility: CLINIC | Age: 67
End: 2022-08-26

## 2022-08-30 NOTE — TELEPHONE ENCOUNTER
Please see more recent MyChart encounter 8/30/22.    Corine Suarez RN  [facility] Triage Team

## 2022-08-30 NOTE — RESULT ENCOUNTER NOTE
"Yessy-  Here are your recent results.         Your labs show that your LDL cholesterol is elevated .  This raises your risk of cardiovascular disease.  We currently assess a risk \" score\" to determine whether you should begin a statin medication.  Based on your risk factors, you would benefit from starting a statin medication to improve your cholesterol and reduce your risk of heart disease.  If you are agreeable, please let me know and I will send a prescription to the pharmacy for you    Benji"

## 2022-09-20 ENCOUNTER — OFFICE VISIT (OUTPATIENT)
Dept: PODIATRY | Facility: CLINIC | Age: 67
End: 2022-09-20
Payer: COMMERCIAL

## 2022-09-20 VITALS — HEIGHT: 65 IN | BODY MASS INDEX: 28.36 KG/M2

## 2022-09-20 DIAGNOSIS — M19.072 ARTHRITIS OF LEFT FOOT: ICD-10-CM

## 2022-09-20 DIAGNOSIS — Q66.222 METATARSUS ADDUCTUS OF BOTH FEET: Primary | ICD-10-CM

## 2022-09-20 DIAGNOSIS — Q66.6 PES VALGUS: ICD-10-CM

## 2022-09-20 DIAGNOSIS — Q66.221 METATARSUS ADDUCTUS OF BOTH FEET: Primary | ICD-10-CM

## 2022-09-20 PROCEDURE — 99203 OFFICE O/P NEW LOW 30 MIN: CPT | Performed by: PODIATRIST

## 2022-09-20 NOTE — PATIENT INSTRUCTIONS
PATIENT INSTRUCTIONS - Podiatry / Foot & Ankle Surgery      If continued pain, we can obtain x-rays of both feet   X-ray guided cortisone injection if needed        [facility]  [facility]  [address]  [place] [address]  Phone: [phone]  Fax: [phone] [facility]  [address]  [place] [address]  Phone: [phone]   Fax: [phone]   [facility]  [address]  [place] [address]  Phone: [phone]  Fax: [phone] [facility]  [address]  [place] [address]  Phone: [phone]   Fax: [phone]   [facility]   [address]  [place] [address]  Phone: [phone]  Fax: [phone] * Please call any location listed to make an appointment for a casting/fitting. Your referral was sent to their central office and they will all have the order on file.       Diclofenac / Voltaren Gel- Apply to affected area only.  Apply 3-4 times daily for the first 3-4 days, then 1-2 times daily as needed.  Over the counter (OTC), a prescription is not needed.  Available at most pharmacies, [employer], [employer].

## 2022-09-20 NOTE — LETTER
9/20/2022         RE: Yessy Kuhn  [address] [place] [address]        Dear Colleague,    Thank you for referring your patient, Yessy Kuhn, to the [facility]. Please see a copy of my visit note below.    Assessment:      ICD-10-CM    1. Metatarsus adductus of both feet  Q66.221 Orthopedic  Referral    Q66.222 Orthotics and Prosthetics DME Orthotic; Foot Orthotics   2. Pes valgus  Q66.6 Orthotics and Prosthetics DME Orthotic; Foot Orthotics   3. Arthritis of left foot  M19.072 Orthotics and Prosthetics DME Orthotic; Foot Orthotics        Plan:  Orders Placed This Encounter   Procedures     Orthotics and Prosthetics DME Orthotic; Foot Orthotics         Discussed the etiology and treatment of the condition with the patient.  Imaging studies reviewed and discussed with the patient.  Discussed surgical and conservative options.    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  -Injection- x-ray guided w/ me discussed  -NSAID- topical,  Rx po if needed  -Activity- low impact- already doing, calf muscle stretching.    WB x-rays if not improved with orthotics    Return:  No follow-ups on file.    Simran Negrete DPM                Chief Complaint:     Patient presents with:  Foot Problems: plantar fasciitis L>R     bilateral foot pain    HPI:  Yessy Kuhn is a 67 year old year old female who presents for evaluation of heel pain.    Pain location - plantar arch  Flat feet  Stands on concrete all day per pt  Does water aerobics due to R TKA & arthritis in feet  Prior 5th met base Fx L, now pain / crepitation L       Past Medical & Surgical History:  Past Medical History:   Diagnosis Date     Irritable bowel syndrome     constipation     Mitral valve prolapse      PONV (postoperative nausea and vomiting)      Presence of intrauterine contraceptive device   1997 removed 2014  "     Past Surgical History:   Procedure Laterality Date     ARTHROPLASTY KNEE Right 3/11/2019    Procedure: RIGHT TOTAL KNEE ARTHROPLASTY;  Surgeon: Tate Cohn MD;  Location: [facility] OR     CHOLECYSTECTOMY, OPEN  1991      Family History   Problem Relation Age of Onset     Respiratory Mother         emphysema - heavy smoker     Cancer Father         bladder and lung at age 50     Diabetes Paternal Grandmother         adult onset     Arthritis Sister         Social History:  ?  History   Smoking Status     Never Smoker   Smokeless Tobacco     Never Used     History   Drug Use No     Social History    Substance and Sexual Activity      Alcohol use: No        Alcohol/week: 0.0 standard drinks      Allergies:  ?   Allergies   Allergen Reactions     Antihistamine [Alkylamines]      Emotional reactions     Diphenhydramine Unknown     Notes: ANXIETY;     Latex Unknown        Medications:    Current Outpatient Medications   Medication     acetaminophen (TYLENOL) 325 MG tablet     atorvastatin (LIPITOR) 20 MG tablet     calcium carbonate 600 mg-vitamin D 400 units (CALTRATE) 600-400 MG-UNIT per tablet     cyanocobalamin (VITAMIN B-12) 500 MCG tablet     ibuprofen (ADVIL/MOTRIN) 800 MG tablet     lisinopril-hydrochlorothiazide (ZESTORETIC) 10-12.5 MG tablet     LORazepam (ATIVAN) 0.5 MG tablet     melatonin 5 MG tablet     polyethylene glycol (MIRALAX) 17 g packet     zolpidem (AMBIEN) 5 MG tablet     No current facility-administered medications for this visit.       Physical Exam:  ?  Vitals:  Ht 1.651 m (5' 5\")   LMP 04/17/2007 (Approximate)   Breastfeeding No   BMI 28.36 kg/m     General:  WD/WN, in NAD.  A&O x3.  Dermatologic:    Skin is intact, open lesions absent.   Skin texture, turgor is normal.  Vascular:  Pulses palpable bilateral.  Digital capillary refill time normal bilateral.  Skin temperature is normalb/l.  Generalized edema- trace bilateral.  Focal edema- mild dorsal lateral column " left.  Neurologic:    Gross sensation normal.  Gait and balance normal.  Musculoskeletal:  Maximal pain to palpation of plantar arch, medial column bilateral.  moderate pain to  ROM 5th TMTJ, left.  No pain to ROM 4th TMTJ, Left.  STJ, MTJ ROM intact to affected extremity.  Muscle strength 5/5  foot and ankle to affected extremity.     Stance:  Foot type:  Valgus, L > R                Again, thank you for allowing me to participate in the care of your patient.        Sincerely,        Simran Negrete DPM

## 2022-09-20 NOTE — PROGRESS NOTES
Assessment:      ICD-10-CM    1. Metatarsus adductus of both feet  Q66.221 Orthopedic  Referral    Q66.222 Orthotics and Prosthetics DME Orthotic; Foot Orthotics   2. Pes valgus  Q66.6 Orthotics and Prosthetics DME Orthotic; Foot Orthotics   3. Arthritis of left foot  M19.072 Orthotics and Prosthetics DME Orthotic; Foot Orthotics        Plan:  Orders Placed This Encounter   Procedures     Orthotics and Prosthetics DME Orthotic; Foot Orthotics         Discussed the etiology and treatment of the condition with the patient.  Imaging studies reviewed and discussed with the patient.  Discussed surgical and conservative options.    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  -Injection- x-ray guided w/ me discussed  -NSAID- topical,  Rx po if needed  -Activity- low impact- already doing, calf muscle stretching.    WB x-rays if not improved with orthotics    Return:  No follow-ups on file.    Simran Negrete DPM                Chief Complaint:     Patient presents with:  Foot Problems: plantar fasciitis L>R     bilateral foot pain    HPI:  Yessy Kuhn is a 67 year old year old female who presents for evaluation of heel pain.    Pain location - plantar arch  Flat feet  Stands on concrete all day per pt  Does water aerobics due to R TKA & arthritis in feet  Prior 5th met base Fx L, now pain / crepitation L       Past Medical & Surgical History:  Past Medical History:   Diagnosis Date     Irritable bowel syndrome     constipation     Mitral valve prolapse      PONV (postoperative nausea and vomiting)      Presence of intrauterine contraceptive device   1997    removed 2014      Past Surgical History:   Procedure Laterality Date     ARTHROPLASTY KNEE Right 3/11/2019    Procedure: RIGHT TOTAL KNEE ARTHROPLASTY;  Surgeon: Tate Cohn MD;  Location:  OR     CHOLECYSTECTOMY, OPEN  1991      Family History   Problem  "Relation Age of Onset     Respiratory Mother         emphysema - heavy smoker     Cancer Father         bladder and lung at age 50     Diabetes Paternal Grandmother         adult onset     Arthritis Sister         Social History:  ?  History   Smoking Status     Never Smoker   Smokeless Tobacco     Never Used     History   Drug Use No     Social History    Substance and Sexual Activity      Alcohol use: No        Alcohol/week: 0.0 standard drinks      Allergies:  ?   Allergies   Allergen Reactions     Antihistamine [Alkylamines]      Emotional reactions     Diphenhydramine Unknown     Notes: ANXIETY;     Latex Unknown        Medications:    Current Outpatient Medications   Medication     acetaminophen (TYLENOL) 325 MG tablet     atorvastatin (LIPITOR) 20 MG tablet     calcium carbonate 600 mg-vitamin D 400 units (CALTRATE) 600-400 MG-UNIT per tablet     cyanocobalamin (VITAMIN B-12) 500 MCG tablet     ibuprofen (ADVIL/MOTRIN) 800 MG tablet     lisinopril-hydrochlorothiazide (ZESTORETIC) 10-12.5 MG tablet     LORazepam (ATIVAN) 0.5 MG tablet     melatonin 5 MG tablet     polyethylene glycol (MIRALAX) 17 g packet     zolpidem (AMBIEN) 5 MG tablet     No current facility-administered medications for this visit.       Physical Exam:  ?  Vitals:  Ht 1.651 m (5' 5\")   LMP 04/17/2007 (Approximate)   Breastfeeding No   BMI 28.36 kg/m     General:  WD/WN, in NAD.  A&O x3.  Dermatologic:    Skin is intact, open lesions absent.   Skin texture, turgor is normal.  Vascular:  Pulses palpable bilateral.  Digital capillary refill time normal bilateral.  Skin temperature is normalb/l.  Generalized edema- trace bilateral.  Focal edema- mild dorsal lateral column left.  Neurologic:    Gross sensation normal.  Gait and balance normal.  Musculoskeletal:  Maximal pain to palpation of plantar arch, medial column bilateral.  moderate pain to  ROM 5th TMTJ, left.  No pain to ROM 4th TMTJ, Left.  STJ, MTJ ROM intact to affected " extremity.  Muscle strength 5/5  foot and ankle to affected extremity.     Stance:  Foot type:  Valgus, L > R

## 2022-09-22 ENCOUNTER — MYC MEDICAL ADVICE (OUTPATIENT)
Dept: FAMILY MEDICINE | Facility: CLINIC | Age: 67
End: 2022-09-22

## 2022-09-22 DIAGNOSIS — I10 BENIGN ESSENTIAL HYPERTENSION: Primary | ICD-10-CM

## 2022-09-26 RX ORDER — LISINOPRIL AND HYDROCHLOROTHIAZIDE 12.5; 2 MG/1; MG/1
1 TABLET ORAL DAILY
Qty: 90 TABLET | Refills: 1 | Status: SHIPPED | OUTPATIENT
Start: 2022-09-26 | End: 2023-03-02

## 2022-09-26 NOTE — TELEPHONE ENCOUNTER
Please see MyChart message and advise.     Patient has sent over her blood pressure log.    Corine Suarez RN  [facility] [place] Triage Team

## 2022-09-26 NOTE — TELEPHONE ENCOUNTER
Thank you for the message.  The patient would benefit from increasing her medication.  BPs are borderline elevated.  I sent in a new script to her pharmacy for lisinopril hydrochlorothiazide 20-12.5 mg  She should begin taking this and follow up in 1 month for BP check

## 2022-09-26 NOTE — TELEPHONE ENCOUNTER
Patient notified of provider's response via [employer].     Corine Suarez RN  [facility] Triage Team

## 2022-10-10 ENCOUNTER — HEALTH MAINTENANCE LETTER (OUTPATIENT)
Age: 67
End: 2022-10-10

## 2022-10-26 ENCOUNTER — IMMUNIZATION (OUTPATIENT)
Dept: NURSING | Facility: CLINIC | Age: 67
End: 2022-10-26
Payer: MEDICARE

## 2022-10-26 PROCEDURE — G0008 ADMIN INFLUENZA VIRUS VAC: HCPCS

## 2022-10-26 PROCEDURE — 90662 IIV NO PRSV INCREASED AG IM: CPT

## 2022-11-17 ENCOUNTER — MYC MEDICAL ADVICE (OUTPATIENT)
Dept: FAMILY MEDICINE | Facility: CLINIC | Age: 67
End: 2022-11-17

## 2022-11-22 NOTE — TELEPHONE ENCOUNTER
Patient notified of provider's response via [employer].    Corine Suarez RN  [facility] Triage [facility]

## 2022-11-22 NOTE — TELEPHONE ENCOUNTER
Please see [employer] message and advise.     Patient is wondering if she needs the pneumococcal vaccine. According to health maintenance it is due.    [place] Triage Team

## 2022-11-24 DIAGNOSIS — I10 BENIGN ESSENTIAL HYPERTENSION: ICD-10-CM

## 2022-11-28 RX ORDER — LISINOPRIL/HYDROCHLOROTHIAZIDE 10-12.5 MG
1 TABLET ORAL DAILY
Qty: 90 TABLET | Refills: 1 | Status: SHIPPED | OUTPATIENT
Start: 2022-11-28 | End: 2022-09-26 | Stop reason: DRUGHIGH

## 2022-11-28 NOTE — TELEPHONE ENCOUNTER
Routing refill request to provider for review/approval because:  Labs out of range:  Creatinine, sodium  Patient is blood pressure is not within protocol range.    Creatinine   Date Value Ref Range Status   06/16/2022 0.46 (L) 0.52 - 1.04 mg/dL Final   12/14/2020 0.59 0.52 - 1.04 mg/dL Final     Sodium   Date Value Ref Range Status   07/07/2022 132 (L) 133 - 144 mmol/L Final   12/14/2020 138 133 - 144 mmol/L Final     BP Readings from Last 3 Encounters:   07/07/22 (!) 145/68   06/16/22 (!) 144/80   04/13/22 (!) 150/78     Corine Suarez RN

## 2022-12-22 ENCOUNTER — ALLIED HEALTH/NURSE VISIT (OUTPATIENT)
Dept: FAMILY MEDICINE | Facility: CLINIC | Age: 67
End: 2022-12-22
Payer: MEDICARE

## 2022-12-22 DIAGNOSIS — Z23 NEED FOR VACCINATION: Primary | ICD-10-CM

## 2022-12-22 PROCEDURE — 90677 PCV20 VACCINE IM: CPT

## 2022-12-22 PROCEDURE — G0009 ADMIN PNEUMOCOCCAL VACCINE: HCPCS

## 2022-12-22 PROCEDURE — 99207 PR NO CHARGE NURSE ONLY: CPT

## 2023-03-08 ENCOUNTER — TRANSFERRED RECORDS (OUTPATIENT)
Dept: HEALTH INFORMATION MANAGEMENT | Facility: CLINIC | Age: 68
End: 2023-03-08

## 2023-03-14 ENCOUNTER — VIRTUAL VISIT (OUTPATIENT)
Dept: FAMILY MEDICINE | Facility: CLINIC | Age: 68
End: 2023-03-14
Payer: MEDICARE

## 2023-03-14 DIAGNOSIS — I10 BENIGN ESSENTIAL HYPERTENSION: Primary | ICD-10-CM

## 2023-03-14 DIAGNOSIS — F41.9 ANXIETY: ICD-10-CM

## 2023-03-14 DIAGNOSIS — F33.1 MODERATE EPISODE OF RECURRENT MAJOR DEPRESSIVE DISORDER (H): ICD-10-CM

## 2023-03-14 PROCEDURE — 98966 PH1 ASSMT&MGMT NQHP 5-10: CPT | Mod: VID | Performed by: PHYSICIAN ASSISTANT

## 2023-03-14 NOTE — PROGRESS NOTES
"Yessy is a 67 year old who is being evaluated via a billable video visit.      How would you like to obtain your AVS? [employer]  If the video visit is dropped, the invitation should be resent by: Text to cell phone: [phone]  Will anyone else be joining your video visit? No        Assessment & Plan   Yessy has been experiencing some situational anxiety recently.  Not interested in medication at this time.  I placed a referral for therapist.  She is going to schedule an appiontment.  She will follow up as needed       Anxiety    Moderate episode of recurrent major depressive disorder (H)    Benign essential hypertension  Stable, follow up in 6 months         BMI:   Estimated body mass index is 28.36 kg/m  as calculated from the following:    Height as of 9/20/22: 1.651 m (5' 5\").    Weight as of 6/16/22: 77.3 kg (170 lb 6.4 oz).       Return in about 4 weeks (around 4/11/2023) for with therapist.    Benji Damon PA-C  [facility]   Yessy is a 67 year old presenting for the following health issues:  Follow Up (Follow from [employer] message)      HPI     Yessy has been experiencing some depressed mood and increased anxiety recently relating to her marriage.  She has been  for 40+ years and is currently planning jail.  She does not feel happy in her marriage but is unclear how to move forward.Feels \" frozen\"        Review of Systems   Constitutional, HEENT, cardiovascular, pulmonary, gi and gu systems are negative, except as otherwise noted.      Objective           Vitals:  No vitals were obtained today due to virtual visit.    Physical Exam   GENERAL: Healthy, alert and no distress  EYES: Eyes grossly normal to inspection.  No discharge or erythema, or obvious scleral/conjunctival abnormalities.  RESP: No audible wheeze, cough, or visible cyanosis.  No visible retractions or increased work of breathing.    SKIN: Visible skin clear. No significant rash, " abnormal pigmentation or lesions.  NEURO: Cranial nerves grossly intact.  Mentation and speech appropriate for age.  PSYCH: Mentation appears normal, affect normal/bright, judgement and insight intact, normal speech and appearance well-groomed.                Video-Visit Details    Type of service:  Video Visit   Video Start Time: 1:04 PM  Video End Time: 115    Originating Location (pt. Location): Home  Distant Location (provider location):  On-site  Platform used for Video Visit:  Benoit, telephone visit

## 2023-03-20 ENCOUNTER — TRANSFERRED RECORDS (OUTPATIENT)
Dept: HEALTH INFORMATION MANAGEMENT | Facility: CLINIC | Age: 68
End: 2023-03-20

## 2023-03-25 ENCOUNTER — HEALTH MAINTENANCE LETTER (OUTPATIENT)
Age: 68
End: 2023-03-25

## 2023-04-05 ENCOUNTER — TRANSFERRED RECORDS (OUTPATIENT)
Dept: HEALTH INFORMATION MANAGEMENT | Facility: CLINIC | Age: 68
End: 2023-04-05
Payer: MEDICARE

## 2023-05-28 ENCOUNTER — MYC REFILL (OUTPATIENT)
Dept: FAMILY MEDICINE | Facility: CLINIC | Age: 68
End: 2023-05-28
Payer: MEDICARE

## 2023-05-28 DIAGNOSIS — E78.5 HYPERLIPIDEMIA LDL GOAL <100: ICD-10-CM

## 2023-05-31 RX ORDER — ATORVASTATIN CALCIUM 20 MG/1
20 TABLET, FILM COATED ORAL DAILY
Qty: 90 TABLET | Refills: 0 | OUTPATIENT
Start: 2023-05-31

## 2023-05-31 RX ORDER — ATORVASTATIN CALCIUM 20 MG/1
20 TABLET, FILM COATED ORAL DAILY
Qty: 90 TABLET | Refills: 0 | Status: SHIPPED | OUTPATIENT
Start: 2023-05-31 | End: 2023-06-19

## 2023-05-31 NOTE — TELEPHONE ENCOUNTER
Prescription approved per [facility] Refill Protocol.  Bree Cheung, RN  [facility] Triage Nurse

## 2023-06-04 DIAGNOSIS — I10 BENIGN ESSENTIAL HYPERTENSION: ICD-10-CM

## 2023-06-06 RX ORDER — LISINOPRIL AND HYDROCHLOROTHIAZIDE 12.5; 2 MG/1; MG/1
1 TABLET ORAL DAILY
Qty: 90 TABLET | Refills: 0 | Status: SHIPPED | OUTPATIENT
Start: 2023-06-06 | End: 2023-06-19

## 2023-06-12 ASSESSMENT — ENCOUNTER SYMPTOMS
HEMATURIA: 0
CHILLS: 0
HEMATOCHEZIA: 0
CONSTIPATION: 0
JOINT SWELLING: 1
BREAST MASS: 0
PARESTHESIAS: 0
COUGH: 0
HEARTBURN: 0
SORE THROAT: 0
DIARRHEA: 0
PALPITATIONS: 0
WEAKNESS: 0
DIZZINESS: 0
SHORTNESS OF BREATH: 0
NAUSEA: 0
DYSURIA: 0
ABDOMINAL PAIN: 0
HEADACHES: 0
ARTHRALGIAS: 1
MYALGIAS: 0
FEVER: 0
FREQUENCY: 0
NERVOUS/ANXIOUS: 0
EYE PAIN: 0

## 2023-06-12 ASSESSMENT — ACTIVITIES OF DAILY LIVING (ADL): CURRENT_FUNCTION: NO ASSISTANCE NEEDED

## 2023-06-19 ENCOUNTER — OFFICE VISIT (OUTPATIENT)
Dept: FAMILY MEDICINE | Facility: CLINIC | Age: 68
End: 2023-06-19
Payer: MEDICARE

## 2023-06-19 VITALS
RESPIRATION RATE: 15 BRPM | DIASTOLIC BLOOD PRESSURE: 82 MMHG | OXYGEN SATURATION: 99 % | HEIGHT: 65 IN | HEART RATE: 76 BPM | TEMPERATURE: 97.7 F | WEIGHT: 174.2 LBS | SYSTOLIC BLOOD PRESSURE: 134 MMHG | BODY MASS INDEX: 29.02 KG/M2

## 2023-06-19 DIAGNOSIS — E78.5 HYPERLIPIDEMIA LDL GOAL <100: ICD-10-CM

## 2023-06-19 DIAGNOSIS — G47.00 INSOMNIA, UNSPECIFIED TYPE: ICD-10-CM

## 2023-06-19 DIAGNOSIS — Z00.00 MEDICARE ANNUAL WELLNESS VISIT, INITIAL: ICD-10-CM

## 2023-06-19 DIAGNOSIS — I10 BENIGN ESSENTIAL HYPERTENSION: ICD-10-CM

## 2023-06-19 DIAGNOSIS — Z23 HIGH PRIORITY FOR 2019-NCOV VACCINE: Primary | ICD-10-CM

## 2023-06-19 DIAGNOSIS — Z13.220 SCREENING FOR HYPERLIPIDEMIA: ICD-10-CM

## 2023-06-19 DIAGNOSIS — F41.9 ANXIETY: ICD-10-CM

## 2023-06-19 LAB
ALBUMIN SERPL BCG-MCNC: 4.7 G/DL (ref 3.5–5.2)
ALP SERPL-CCNC: 105 U/L (ref 35–104)
ALT SERPL W P-5'-P-CCNC: 27 U/L (ref 0–50)
ANION GAP SERPL CALCULATED.3IONS-SCNC: 12 MMOL/L (ref 7–15)
AST SERPL W P-5'-P-CCNC: 34 U/L (ref 0–45)
BILIRUB SERPL-MCNC: 1 MG/DL
BUN SERPL-MCNC: 8.7 MG/DL (ref 8–23)
CALCIUM SERPL-MCNC: 9.8 MG/DL (ref 8.8–10.2)
CHLORIDE SERPL-SCNC: 97 MMOL/L (ref 98–107)
CHOLEST SERPL-MCNC: 141 MG/DL
CREAT SERPL-MCNC: 0.53 MG/DL (ref 0.51–0.95)
DEPRECATED HCO3 PLAS-SCNC: 26 MMOL/L (ref 22–29)
GFR SERPL CREATININE-BSD FRML MDRD: >90 ML/MIN/1.73M2
GLUCOSE SERPL-MCNC: 84 MG/DL (ref 70–99)
HDLC SERPL-MCNC: 78 MG/DL
LDLC SERPL CALC-MCNC: 53 MG/DL
NONHDLC SERPL-MCNC: 63 MG/DL
POTASSIUM SERPL-SCNC: 4.3 MMOL/L (ref 3.4–5.3)
PROT SERPL-MCNC: 6.9 G/DL (ref 6.4–8.3)
SODIUM SERPL-SCNC: 135 MMOL/L (ref 136–145)
TRIGL SERPL-MCNC: 51 MG/DL

## 2023-06-19 PROCEDURE — 0124A COVID-19 BIVALENT 12+ (PFIZER): CPT | Performed by: PHYSICIAN ASSISTANT

## 2023-06-19 PROCEDURE — 80053 COMPREHEN METABOLIC PANEL: CPT | Performed by: PHYSICIAN ASSISTANT

## 2023-06-19 PROCEDURE — 80061 LIPID PANEL: CPT | Performed by: PHYSICIAN ASSISTANT

## 2023-06-19 PROCEDURE — 36415 COLL VENOUS BLD VENIPUNCTURE: CPT | Performed by: PHYSICIAN ASSISTANT

## 2023-06-19 PROCEDURE — G0402 INITIAL PREVENTIVE EXAM: HCPCS | Performed by: PHYSICIAN ASSISTANT

## 2023-06-19 PROCEDURE — 91312 COVID-19 BIVALENT 12+ (PFIZER): CPT | Performed by: PHYSICIAN ASSISTANT

## 2023-06-19 PROCEDURE — 99214 OFFICE O/P EST MOD 30 MIN: CPT | Mod: 25 | Performed by: PHYSICIAN ASSISTANT

## 2023-06-19 RX ORDER — ATORVASTATIN CALCIUM 20 MG/1
20 TABLET, FILM COATED ORAL DAILY
Qty: 90 TABLET | Refills: 3 | Status: SHIPPED | OUTPATIENT
Start: 2023-06-19 | End: 2024-08-13

## 2023-06-19 RX ORDER — ZOLPIDEM TARTRATE 5 MG/1
5 TABLET ORAL
Qty: 30 TABLET | Refills: 0 | Status: SHIPPED | OUTPATIENT
Start: 2023-06-19 | End: 2024-08-13

## 2023-06-19 RX ORDER — LISINOPRIL AND HYDROCHLOROTHIAZIDE 12.5; 2 MG/1; MG/1
1 TABLET ORAL DAILY
Qty: 90 TABLET | Refills: 1 | Status: SHIPPED | OUTPATIENT
Start: 2023-06-19 | End: 2024-03-12

## 2023-06-19 RX ORDER — LORAZEPAM 0.5 MG/1
0.5 TABLET ORAL EVERY 6 HOURS PRN
Qty: 20 TABLET | Refills: 0 | Status: SHIPPED | OUTPATIENT
Start: 2023-06-19

## 2023-06-19 ASSESSMENT — ENCOUNTER SYMPTOMS
PARESTHESIAS: 0
NAUSEA: 0
FEVER: 0
HEARTBURN: 0
HEMATURIA: 0
DYSURIA: 0
CHILLS: 0
JOINT SWELLING: 1
ABDOMINAL PAIN: 0
WEAKNESS: 0
SORE THROAT: 0
MYALGIAS: 0
CONSTIPATION: 0
HEMATOCHEZIA: 0
NERVOUS/ANXIOUS: 0
FREQUENCY: 0
COUGH: 0
BREAST MASS: 0
DIZZINESS: 0
HEADACHES: 0
ARTHRALGIAS: 1
DIARRHEA: 0
PALPITATIONS: 0
EYE PAIN: 0
SHORTNESS OF BREATH: 0

## 2023-06-19 ASSESSMENT — PAIN SCALES - GENERAL: PAINLEVEL: NO PAIN (0)

## 2023-06-19 ASSESSMENT — PATIENT HEALTH QUESTIONNAIRE - PHQ9
10. IF YOU CHECKED OFF ANY PROBLEMS, HOW DIFFICULT HAVE THESE PROBLEMS MADE IT FOR YOU TO DO YOUR WORK, TAKE CARE OF THINGS AT HOME, OR GET ALONG WITH OTHER PEOPLE: SOMEWHAT DIFFICULT
SUM OF ALL RESPONSES TO PHQ QUESTIONS 1-9: 1
SUM OF ALL RESPONSES TO PHQ QUESTIONS 1-9: 1

## 2023-06-19 ASSESSMENT — ACTIVITIES OF DAILY LIVING (ADL): CURRENT_FUNCTION: NO ASSISTANCE NEEDED

## 2023-06-19 NOTE — PROGRESS NOTES
"SUBJECTIVE:   Yessy is a 68 year old who presents for Preventive Visit.      6/19/2023     7:13 AM   Additional Questions   Roomed by Nadia     Are you in the first 12 months of your Medicare coverage?  Yes,  Visual Acuity:  Right Eye: 20/20   Left Eye: 20/50 (diagosed with eye condition in L eye - [facility])  Both Eyes: 20/13    Healthy Habits:     In general, how would you rate your overall health?  Good    Frequency of exercise:  2-3 days/week    Duration of exercise:  Less than 15 minutes    Do you usually eat at least 4 servings of fruit and vegetables a day, include whole grains    & fiber and avoid regularly eating high fat or \"junk\" foods?  Yes    Taking medications regularly:  Yes    Medication side effects:  None    Ability to successfully perform activities of daily living:  No assistance needed    Home Safety:  No safety concerns identified    Hearing Impairment:  No hearing concerns    In the past 6 months, have you been bothered by leaking of urine?  No    In general, how would you rate your overall mental or emotional health?  Fair      PHQ-2 Total Score: 0    Additional concerns today:  No        Have you ever done Advance Care Planning? (For example, a Health Directive, POLST, or a discussion with a medical provider or your loved ones about your wishes): No, advance care planning information given to patient to review.  Patient declined advance care planning discussion at this time.        Fall risk  Fallen 2 or more times in the past year?: No  Any fall with injury in the past year?: No    Cognitive Screening   1) Repeat 3 items (Leader, Season, Table)    2) Clock draw: NORMAL  3) 3 item recall: Recalls 3 objects  Results: 3 items recalled: COGNITIVE IMPAIRMENT LESS LIKELY    Mini-CogTM Copyright LASHON King. Licensed by the author for use in [facility]; reprinted with permission (monika@.[facility]). All rights reserved.      Do you have sleep apnea, excessive snoring or daytime " drowsiness?: no    Reviewed and updated as needed this visit by clinical staff   Tobacco  Allergies  Meds  Problems  Med Hx  Surg Hx  Fam Hx          Reviewed and updated as needed this visit by Provider      Problems  Med Hx  Surg Hx  Fam Hx         Social History     Tobacco Use     Smoking status: Never     Smokeless tobacco: Never   Vaping Use     Vaping status: Not on file   Substance Use Topics     Alcohol use: No             6/12/2023     9:04 AM   Alcohol Use   Prescreen: >3 drinks/day or >7 drinks/week? Not Applicable          View : No data to display.              Do you have a current opioid prescription? No  Do you use any other controlled substances or medications that are not prescribed by a provider? None and delta 9 occasional        Current providers sharing in care for this patient include:  Patient Care Team:  Benji Damon PA-C as PCP - General (Family Medicine)  Benji Damon PA-C as Assigned PCP  Simran Negrete DPM as Assigned Surgical Provider    The following health maintenance items are reviewed in Epic and correct as of today:  Health Maintenance   Topic Date Due     MEDICARE ANNUAL WELLNESS VISIT  11/17/2022     PHQ-9  12/19/2023     COLORECTAL CANCER SCREENING  05/01/2024     ANNUAL REVIEW OF HM ORDERS  06/19/2024     FALL RISK ASSESSMENT  06/19/2024     MAMMO SCREENING  07/11/2024     DTAP/TDAP/TD IMMUNIZATION (3 - Td or Tdap) 04/18/2026     LIPID  08/25/2027     ADVANCE CARE PLANNING  06/19/2028     DEXA  08/01/2033     DEPRESSION ACTION PLAN  Completed     INFLUENZA VACCINE  Completed     Pneumococcal Vaccine: 65+ Years  Completed     ZOSTER IMMUNIZATION  Completed     COVID-19 Vaccine  Completed     IPV IMMUNIZATION  Aged Out     MENINGITIS IMMUNIZATION  Aged Out     HEPATITIS C SCREENING  Discontinued     PAP  Discontinued     Patient Active Problem List   Diagnosis     Mitral valve disorder     Herniated cervical intervertebral disc      CARDIOVASCULAR SCREENING; LDL GOAL LESS THAN 160     Obesity, Class II, BMI 35-39.9     S/P total knee arthroplasty     Benign essential hypertension     Past Surgical History:   Procedure Laterality Date     ARTHROPLASTY KNEE Right 3/11/2019    Procedure: RIGHT TOTAL KNEE ARTHROPLASTY;  Surgeon: Tate Cohn MD;  Location: [facility] OR     CHOLECYSTECTOMY, OPEN  1991       Social History     Tobacco Use     Smoking status: Never     Smokeless tobacco: Never   Vaping Use     Vaping status: Not on file   Substance Use Topics     Alcohol use: No     Family History   Problem Relation Age of Onset     Respiratory Mother         emphysema - heavy smoker     Depression Mother      Substance Abuse Mother      Cancer Father         bladder and lung at age 50     Other Cancer Father         Heavy smoker; bladder, lung, pancreatic     Coronary Artery Disease Sister         Age 73, heavy smoker, poor health, copd     Cerebrovascular Disease Sister         TIA Jan 2023     Depression Sister      Substance Abuse Brother      Substance Abuse Maternal Grandfather      Diabetes Paternal Grandmother         adult onset     Arthritis Sister      Breast Cancer Cousin      Substance Abuse Brother          Current Outpatient Medications   Medication Sig Dispense Refill     acetaminophen (TYLENOL) 325 MG tablet Take 650 mg by mouth 2 times daily as needed for mild pain       atorvastatin (LIPITOR) 20 MG tablet Take 1 tablet (20 mg) by mouth daily 90 tablet 3     calcium carbonate 600 mg-vitamin D 400 units (CALTRATE) 600-400 MG-UNIT per tablet Take 1 tablet by mouth 2 times daily       cyanocobalamin (VITAMIN B-12) 500 MCG tablet Take 500 mcg by mouth daily       lisinopril-hydrochlorothiazide (ZESTORETIC) 20-12.5 MG tablet Take 1 tablet by mouth daily 90 tablet 1     LORazepam (ATIVAN) 0.5 MG tablet Take 1 tablet (0.5 mg) by mouth every 6 hours as needed for anxiety 20 tablet 0     melatonin 5 MG tablet Take 10 mg by mouth nightly  "as needed for sleep (2 x 5mg = 10mg)       polyethylene glycol (MIRALAX) 17 g packet Take 1 packet by mouth daily       zolpidem (AMBIEN) 5 MG tablet Take 1 tablet (5 mg) by mouth nightly as needed for sleep 30 tablet 0     ibuprofen (ADVIL/MOTRIN) 800 MG tablet Take 800 mg by mouth every 8 hours as needed for moderate pain       Allergies   Allergen Reactions     Antihistamine [Antihistamines, Chlorpheniramine-Type]      Emotional reactions     Diphenhydramine Unknown     Notes: ANXIETY;     Latex Unknown             12/1/2021    11:24 AM   Breast CA Risk Assessment (FHS-7)   Do you have a family history of breast, colon, or ovarian cancer? No / Unknown         Mammogram Screening: Recommended mammography every 1-2 years with patient discussion and risk factor consideration  Pertinent mammograms are reviewed under the imaging tab.    Review of Systems   Constitutional: Negative for chills and fever.   HENT: Negative for congestion, ear pain, hearing loss and sore throat.    Eyes: Negative for pain and visual disturbance.   Respiratory: Negative for cough and shortness of breath.    Cardiovascular: Negative for chest pain, palpitations and peripheral edema.   Gastrointestinal: Negative for abdominal pain, constipation, diarrhea, heartburn, hematochezia and nausea.   Breasts:  Negative for tenderness, breast mass and discharge.   Genitourinary: Negative for dysuria, frequency, genital sores, hematuria, pelvic pain, urgency, vaginal bleeding and vaginal discharge.   Musculoskeletal: Positive for arthralgias and joint swelling. Negative for myalgias.   Skin: Negative for rash.   Neurological: Negative for dizziness, weakness, headaches and paresthesias.   Psychiatric/Behavioral: Negative for mood changes. The patient is not nervous/anxious.          OBJECTIVE:   /82   Pulse 76   Temp 97.7  F (36.5  C) (Temporal)   Resp 15   Ht 1.651 m (5' 5\")   Wt 79 kg (174 lb 3.2 oz)   LMP 04/17/2007 (Approximate)   SpO2 " "99%   BMI 28.99 kg/m   Estimated body mass index is 28.99 kg/m  as calculated from the following:    Height as of this encounter: 1.651 m (5' 5\").    Weight as of this encounter: 79 kg (174 lb 3.2 oz).  Physical Exam  GENERAL: healthy, alert and no distress  EYES: Eyes grossly normal to inspection, PERRL and conjunctivae and sclerae normal  HENT: ear canals and TM's normal, nose and mouth without ulcers or lesions  NECK: no adenopathy, no asymmetry, masses, or scars and thyroid normal to palpation  RESP: lungs clear to auscultation - no rales, rhonchi or wheezes  BREAST: normal without masses, tenderness or nipple discharge and no palpable axillary masses or adenopathy  CV: regular rate and rhythm, normal S1 S2, no S3 or S4, no murmur, click or rub, no peripheral edema and peripheral pulses strong  ABDOMEN: soft, nontender, no hepatosplenomegaly, no masses and bowel sounds normal   (female): normal female external genitalia, normal urethral meatus, vaginal mucosa, normal cervix/adnexa/uterus without masses or discharge  MS: no gross musculoskeletal defects noted, no edema  SKIN: no suspicious lesions or rashes  NEURO: Normal strength and tone, mentation intact and speech normal  PSYCH: mentation appears normal, affect normal/bright        ASSESSMENT / PLAN:     1. Medicare annual wellness visit, initial    2. Anxiety  Stable, intermittent use.  - LORazepam (ATIVAN) 0.5 MG tablet; Take 1 tablet (0.5 mg) by mouth every 6 hours as needed for anxiety  Dispense: 20 tablet; Refill: 0    3. Benign essential hypertension  controlled  - lisinopril-hydrochlorothiazide (ZESTORETIC) 20-12.5 MG tablet; Take 1 tablet by mouth daily  Dispense: 90 tablet; Refill: 1    4. Insomnia, unspecified type  Stable, intermittent use  - zolpidem (AMBIEN) 5 MG tablet; Take 1 tablet (5 mg) by mouth nightly as needed for sleep  Dispense: 30 tablet; Refill: 0    5. Hyperlipidemia LDL goal <100  stable  - atorvastatin (LIPITOR) 20 MG tablet; " Take 1 tablet (20 mg) by mouth daily  Dispense: 90 tablet; Refill: 3  - Lipid Profile (Chol, Trig, HDL, LDL calc); Future  - Comprehensive metabolic panel (BMP + Alb, Alk Phos, ALT, AST, Total. Bili, TP); Future  - Comprehensive metabolic panel (BMP + Alb, Alk Phos, ALT, AST, Total. Bili, TP)    6. High priority for 2019-nCoV vaccine  - COVID-19 BIVALENT 12+ (PFIZER)    7. Screening for hyperlipidemia  - Lipid panel reflex to direct LDL Fasting            COUNSELING:  Reviewed preventive health counseling, as reflected in patient instructions       Regular exercise       Healthy diet/nutrition        She reports that she has never smoked. She has never used smokeless tobacco.      Appropriate preventive services were discussed with this patient, including applicable screening as appropriate for cardiovascular disease, diabetes, osteopenia/osteoporosis, and glaucoma.  As appropriate for age/gender, discussed screening for colorectal cancer, prostate cancer, breast cancer, and cervical cancer. Checklist reviewing preventive services available has been given to the patient.    Reviewed patients plan of care and provided an AVS. The Basic Care Plan (routine screening as documented in Health Maintenance) for Yessy meets the Care Plan requirement. This Care Plan has been established and reviewed with the Patient.      Benji Damon PA-C  [facility]    Identified Health Risks:    I have reviewed Opioid Use Disorder and Substance Use Disorder risk factors and made any needed referrals.     Answers for HPI/ROS submitted by the patient on 6/19/2023  If you checked off any problems, how difficult have these problems made it for you to do your work, take care of things at home, or get along with other people?: Somewhat difficult  PHQ9 TOTAL SCORE: 1

## 2023-06-21 NOTE — RESULT ENCOUNTER NOTE
Yessy-  Here are your recent results.     Your labs look normal with the exception of a mildly low sodium level.  This is improved from previous and is currently 135.  The likely cause of this is your blood pressure medication.  We should monitor this  again in 6 months.  Your cholesterol values are normal at this time.  You should continue the same medication regimen    Benji Damon PA-C

## 2023-08-07 ENCOUNTER — TRANSFERRED RECORDS (OUTPATIENT)
Dept: HEALTH INFORMATION MANAGEMENT | Facility: CLINIC | Age: 68
End: 2023-08-07
Payer: MEDICARE

## 2023-08-28 ENCOUNTER — TRANSFERRED RECORDS (OUTPATIENT)
Dept: HEALTH INFORMATION MANAGEMENT | Facility: CLINIC | Age: 68
End: 2023-08-28
Payer: MEDICARE

## 2023-08-31 ENCOUNTER — MYC MEDICAL ADVICE (OUTPATIENT)
Dept: FAMILY MEDICINE | Facility: CLINIC | Age: 68
End: 2023-08-31
Payer: MEDICARE

## 2023-08-31 DIAGNOSIS — I10 BENIGN ESSENTIAL HYPERTENSION: ICD-10-CM

## 2023-08-31 DIAGNOSIS — E78.5 HYPERLIPIDEMIA LDL GOAL <100: ICD-10-CM

## 2023-08-31 RX ORDER — LISINOPRIL AND HYDROCHLOROTHIAZIDE 12.5; 2 MG/1; MG/1
1 TABLET ORAL DAILY
Qty: 90 TABLET | Refills: 1 | Status: CANCELLED | OUTPATIENT
Start: 2023-08-31

## 2023-08-31 RX ORDER — ATORVASTATIN CALCIUM 20 MG/1
20 TABLET, FILM COATED ORAL DAILY
Qty: 90 TABLET | Refills: 3 | Status: CANCELLED | OUTPATIENT
Start: 2023-08-31

## 2023-09-21 ENCOUNTER — MYC MEDICAL ADVICE (OUTPATIENT)
Dept: FAMILY MEDICINE | Facility: CLINIC | Age: 68
End: 2023-09-21
Payer: MEDICARE

## 2023-10-30 ENCOUNTER — MYC MEDICAL ADVICE (OUTPATIENT)
Dept: FAMILY MEDICINE | Facility: CLINIC | Age: 68
End: 2023-10-30
Payer: MEDICARE

## 2023-11-02 ENCOUNTER — TRANSFERRED RECORDS (OUTPATIENT)
Dept: HEALTH INFORMATION MANAGEMENT | Facility: CLINIC | Age: 68
End: 2023-11-02
Payer: MEDICARE

## 2023-11-12 ENCOUNTER — TRANSFERRED RECORDS (OUTPATIENT)
Dept: HEALTH INFORMATION MANAGEMENT | Facility: CLINIC | Age: 68
End: 2023-11-12

## 2024-01-23 ENCOUNTER — OFFICE VISIT (OUTPATIENT)
Dept: FAMILY MEDICINE | Facility: CLINIC | Age: 69
End: 2024-01-23
Payer: MEDICARE

## 2024-01-23 VITALS
BODY MASS INDEX: 31.86 KG/M2 | DIASTOLIC BLOOD PRESSURE: 62 MMHG | WEIGHT: 191.2 LBS | HEIGHT: 65 IN | TEMPERATURE: 97.7 F | SYSTOLIC BLOOD PRESSURE: 134 MMHG | OXYGEN SATURATION: 98 % | HEART RATE: 96 BPM | RESPIRATION RATE: 21 BRPM

## 2024-01-23 DIAGNOSIS — Z01.818 PREOPERATIVE EXAMINATION: Primary | ICD-10-CM

## 2024-01-23 DIAGNOSIS — M19.039 ARTHRITIS OF WRIST: ICD-10-CM

## 2024-01-23 LAB
ANION GAP SERPL CALCULATED.3IONS-SCNC: 12 MMOL/L (ref 7–15)
BUN SERPL-MCNC: 7.5 MG/DL (ref 8–23)
CALCIUM SERPL-MCNC: 9.9 MG/DL (ref 8.8–10.2)
CHLORIDE SERPL-SCNC: 95 MMOL/L (ref 98–107)
CREAT SERPL-MCNC: 0.59 MG/DL (ref 0.51–0.95)
DEPRECATED HCO3 PLAS-SCNC: 25 MMOL/L (ref 22–29)
EGFRCR SERPLBLD CKD-EPI 2021: >90 ML/MIN/1.73M2
GLUCOSE SERPL-MCNC: 86 MG/DL (ref 70–99)
POTASSIUM SERPL-SCNC: 4.5 MMOL/L (ref 3.4–5.3)
SODIUM SERPL-SCNC: 132 MMOL/L (ref 135–145)

## 2024-01-23 PROCEDURE — 80048 BASIC METABOLIC PNL TOTAL CA: CPT | Performed by: PHYSICIAN ASSISTANT

## 2024-01-23 PROCEDURE — 36415 COLL VENOUS BLD VENIPUNCTURE: CPT | Performed by: PHYSICIAN ASSISTANT

## 2024-01-23 PROCEDURE — 99214 OFFICE O/P EST MOD 30 MIN: CPT | Performed by: PHYSICIAN ASSISTANT

## 2024-01-23 ASSESSMENT — PATIENT HEALTH QUESTIONNAIRE - PHQ9
SUM OF ALL RESPONSES TO PHQ QUESTIONS 1-9: 1
10. IF YOU CHECKED OFF ANY PROBLEMS, HOW DIFFICULT HAVE THESE PROBLEMS MADE IT FOR YOU TO DO YOUR WORK, TAKE CARE OF THINGS AT HOME, OR GET ALONG WITH OTHER PEOPLE: NOT DIFFICULT AT ALL
SUM OF ALL RESPONSES TO PHQ QUESTIONS 1-9: 1

## 2024-01-23 ASSESSMENT — PAIN SCALES - GENERAL: PAINLEVEL: NO PAIN (0)

## 2024-01-23 NOTE — PROGRESS NOTES
Preoperative Evaluation  [address] [address]  Phone: [phone]  Primary Provider: Benji Arias  Pre-op Performing Provider: BENJI ARIAS  Jan 23, 2024       Yessy is a 68 year old, presenting for the following:  Pre-Op Exam        1/23/2024     7:24 AM   Additional Questions   Roomed by Aml     Surgical Information  Surgery/Procedure: Right Wrist Surgery - Arthritis   Surgery Location: [facility]   Surgeon: Dr. Navarro  Surgery Date: 2/02/24  Time of Surgery: TBD   Where patient plans to recover: At home with family  Fax number for surgical facility: [phone]    1. Preoperative examination      2. Arthritis of wrist  - Basic metabolic panel  (Ca, Cl, CO2, Creat, Gluc, K, Na, BUN); Future  - Basic metabolic panel  (Ca, Cl, CO2, Creat, Gluc, K, Na, BUN)         Patient can take all of her medications with the exception of supplements and NSAIDs within 7 days of procedure    Approval given to proceed with procedure without further assessment.       Subjective       HPI related to upcoming procedure: Yessy presents for the preoperative examination prior to right wrist surgery for correction of arthritis.  She is feeling well today, no acute concerns.        1/16/2024     9:52 AM   Preop Questions   1. Have you ever had a heart attack or stroke? No   2. Have you ever had surgery on your heart or blood vessels, such as a stent placement, a coronary artery bypass, or surgery on an artery in your head, neck, heart, or legs? No   3. Do you have chest pain with activity? No   4. Do you have a history of  heart failure? No   5. Do you currently have a cold, bronchitis or symptoms of other infection? No   6. Do you have a cough, shortness of breath, or wheezing? No   7. Do you or anyone in your family have previous history of blood clots? No   8. Do you or does anyone in your family have a serious bleeding problem  such as prolonged bleeding following surgeries or cuts? No   9. Have you ever had problems with anemia or been told to take iron pills? No   10. Have you had any abnormal blood loss such as black, tarry or bloody stools, or abnormal vaginal bleeding? No   11. Have you ever had a blood transfusion? No   12. Are you willing to have a blood transfusion if it is medically needed before, during, or after your surgery? Yes   13. Have you or any of your relatives ever had problems with anesthesia? No   14. Do you have sleep apnea, excessive snoring or daytime drowsiness? No   15. Do you have any artifical heart valves or other implanted medical devices like a pacemaker, defibrillator, or continuous glucose monitor? No   16. Do you have artificial joints? YES - knee   17. Are you allergic to latex? YES - wear nitrile gloves       Health Care Directive  Patient has a Health Care Directive on file      Preoperative Review of    reviewed - no record of controlled substances prescribed.      Status of Chronic Conditions:  See problem list for active medical problems.  Problems all longstanding and stable, except as noted/documented.  See ROS for pertinent symptoms related to these conditions.    Patient Active Problem List    Diagnosis Date Noted    Benign essential hypertension 01/13/2022     Priority: Medium    S/P total knee arthroplasty 03/11/2019     Priority: Medium    Obesity, Class II, BMI 35-39.9 04/18/2016     Priority: Medium    CARDIOVASCULAR SCREENING; LDL GOAL LESS THAN 160 10/31/2010     Priority: Medium    Herniated cervical intervertebral disc 07/16/2008     Priority: Medium    Mitral valve disorder      Priority: Medium     prolapse  Problem list name updated by automated process. Provider to review        Past Medical History:   Diagnosis Date    Arthritis     Hypertension     Irritable bowel syndrome     constipation    Mitral valve prolapse     PONV (postoperative nausea and vomiting)     Presence of  intrauterine contraceptive device 1997    removed 2014     Past Surgical History:   Procedure Laterality Date    ABDOMEN SURGERY  2/91    ARTHROPLASTY KNEE Right 03/11/2019    Procedure: RIGHT TOTAL KNEE ARTHROPLASTY;  Surgeon: Tate Cohn MD;  Location: [facility] OR    CHOLECYSTECTOMY, OPEN  01/01/1991    COLONOSCOPY  2014     Current Outpatient Medications   Medication Sig Dispense Refill    acetaminophen (TYLENOL) 325 MG tablet Take 650 mg by mouth 2 times daily as needed for mild pain      atorvastatin (LIPITOR) 20 MG tablet Take 1 tablet (20 mg) by mouth daily 90 tablet 3    calcium carbonate 600 mg-vitamin D 400 units (CALTRATE) 600-400 MG-UNIT per tablet Take 1 tablet by mouth 2 times daily      cyanocobalamin (VITAMIN B-12) 500 MCG tablet Take 500 mcg by mouth daily      lisinopril-hydrochlorothiazide (ZESTORETIC) 20-12.5 MG tablet Take 1 tablet by mouth daily 90 tablet 1    LORazepam (ATIVAN) 0.5 MG tablet Take 1 tablet (0.5 mg) by mouth every 6 hours as needed for anxiety 20 tablet 0    melatonin 5 MG tablet Take 10 mg by mouth nightly as needed for sleep (2 x 5mg = 10mg)      zolpidem (AMBIEN) 5 MG tablet Take 1 tablet (5 mg) by mouth nightly as needed for sleep 30 tablet 0    ibuprofen (ADVIL/MOTRIN) 800 MG tablet Take 800 mg by mouth every 8 hours as needed for moderate pain      polyethylene glycol (MIRALAX) 17 g packet Take 1 packet by mouth daily         Allergies   Allergen Reactions    Antihistamine [Antihistamines, Chlorpheniramine-Type]      Emotional reactions    Diphenhydramine Unknown     Notes: ANXIETY;    Latex Unknown        Social History     Tobacco Use    Smoking status: Never    Smokeless tobacco: Never   Substance Use Topics    Alcohol use: No     Family History   Problem Relation Age of Onset    Respiratory Mother         emphysema - heavy smoker    Depression Mother     Substance Abuse Mother     Cancer Father         bladder and lung at age 50    Other Cancer Father          "Heavy smoker; bladder, lung, pancreatic    Coronary Artery Disease Sister         Age 73, heavy smoker, poor health, copd    Cerebrovascular Disease Sister         TIA Jan 2023    Depression Sister     Substance Abuse Brother     Substance Abuse Maternal Grandfather     Diabetes Paternal Grandmother         adult onset    Arthritis Sister     Breast Cancer Cousin     Substance Abuse Brother      History   Drug Use No         Review of Systems    Review of Systems  Constitutional, HEENT, cardiovascular, pulmonary, GI, , musculoskeletal, neuro, skin, endocrine and psych systems are negative, except as otherwise noted.  Objective    /62   Pulse 96   Temp 97.7  F (36.5  C) (Temporal)   Resp 21   Ht 1.638 m (5' 4.5\")   Wt 86.7 kg (191 lb 3.2 oz)   LMP 04/17/2007 (Approximate)   SpO2 98%   BMI 32.31 kg/m     Estimated body mass index is 32.31 kg/m  as calculated from the following:    Height as of this encounter: 1.638 m (5' 4.5\").    Weight as of this encounter: 86.7 kg (191 lb 3.2 oz).  Physical Exam  GENERAL: alert and no distress  EYES: Eyes grossly normal to inspection, PERRL and conjunctivae and sclerae normal  HENT: ear canals and TM's normal, nose and mouth without ulcers or lesions  NECK: no adenopathy, no asymmetry, masses, or scars  RESP: lungs clear to auscultation - no rales, rhonchi or wheezes  CV: regular rate and rhythm, normal S1 S2, no S3 or S4, no murmur, click or rub, no peripheral edema   MS: no gross musculoskeletal defects noted, no edema  SKIN: no suspicious lesions or rashes  NEURO: Normal strength and tone, mentation intact and speech normal  PSYCH: mentation appears normal, affect normal/bright    Recent Labs   Lab Test 06/19/23  0812 07/07/22  1013 06/16/22  1416 04/13/22  1006 04/13/22  1006   HGB  --   --   --   --  12.9   * 132* 130*   < >  --    POTASSIUM 4.3  --  4.2  --  3.8   CR 0.53  --  0.46*  --  0.48*    < > = values in this interval not displayed.    "     Diagnostics  No results found for this or any previous visit (from the past 24 hour(s)).   No EKG required, no history of coronary heart disease, significant arrhythmia, peripheral arterial disease or other structural heart disease.    Revised Cardiac Risk Index (RCRI)  The patient has the following serious cardiovascular risks for perioperative complications:   - No serious cardiac risks = 0 points     RCRI Interpretation: 0 points: Class I (very low risk - 0.4% complication rate)         Signed Electronically by: Benji Damon PA-C  Copy of this evaluation report is provided to requesting physician.

## 2024-01-26 NOTE — RESULT ENCOUNTER NOTE
Yessy-  Here are your recent results.     Your labs are stable with the exception of slightly low sodium.  This has been noted before and is likely due to your BP medication. We can recheck this once you have recovered from your procedure.     Benji Damon PA-C

## 2024-02-02 ENCOUNTER — LAB REQUISITION (OUTPATIENT)
Dept: LAB | Facility: CLINIC | Age: 69
End: 2024-02-02
Payer: MEDICARE

## 2024-02-02 DIAGNOSIS — M79.9 SOFT TISSUE DISORDER, UNSPECIFIED: ICD-10-CM

## 2024-02-02 PROCEDURE — 88305 TISSUE EXAM BY PATHOLOGIST: CPT | Mod: 26 | Performed by: STUDENT IN AN ORGANIZED HEALTH CARE EDUCATION/TRAINING PROGRAM

## 2024-02-02 PROCEDURE — 88305 TISSUE EXAM BY PATHOLOGIST: CPT | Mod: TC,ORL | Performed by: ORTHOPAEDIC SURGERY

## 2024-02-06 LAB
PATH REPORT.COMMENTS IMP SPEC: NORMAL
PATH REPORT.COMMENTS IMP SPEC: NORMAL
PATH REPORT.FINAL DX SPEC: NORMAL
PATH REPORT.GROSS SPEC: NORMAL
PATH REPORT.MICROSCOPIC SPEC OTHER STN: NORMAL
PATH REPORT.RELEVANT HX SPEC: NORMAL
PHOTO IMAGE: NORMAL

## 2024-02-12 ENCOUNTER — TRANSFERRED RECORDS (OUTPATIENT)
Dept: HEALTH INFORMATION MANAGEMENT | Facility: CLINIC | Age: 69
End: 2024-02-12
Payer: MEDICARE

## 2024-03-12 ENCOUNTER — MYC REFILL (OUTPATIENT)
Dept: FAMILY MEDICINE | Facility: CLINIC | Age: 69
End: 2024-03-12
Payer: MEDICARE

## 2024-03-12 DIAGNOSIS — I10 BENIGN ESSENTIAL HYPERTENSION: ICD-10-CM

## 2024-03-12 RX ORDER — LISINOPRIL AND HYDROCHLOROTHIAZIDE 12.5; 2 MG/1; MG/1
1 TABLET ORAL DAILY
Qty: 90 TABLET | Refills: 1 | Status: SHIPPED | OUTPATIENT
Start: 2024-03-12 | End: 2024-08-13

## 2024-03-18 ENCOUNTER — TRANSFERRED RECORDS (OUTPATIENT)
Dept: HEALTH INFORMATION MANAGEMENT | Facility: CLINIC | Age: 69
End: 2024-03-18
Payer: MEDICARE

## 2024-04-15 ENCOUNTER — TRANSFERRED RECORDS (OUTPATIENT)
Dept: HEALTH INFORMATION MANAGEMENT | Facility: CLINIC | Age: 69
End: 2024-04-15
Payer: MEDICARE

## 2024-05-20 ENCOUNTER — TRANSFERRED RECORDS (OUTPATIENT)
Dept: HEALTH INFORMATION MANAGEMENT | Facility: CLINIC | Age: 69
End: 2024-05-20
Payer: MEDICARE

## 2024-05-23 ENCOUNTER — PATIENT OUTREACH (OUTPATIENT)
Dept: CARE COORDINATION | Facility: CLINIC | Age: 69
End: 2024-05-23
Payer: MEDICARE

## 2024-06-02 ENCOUNTER — MYC MEDICAL ADVICE (OUTPATIENT)
Dept: FAMILY MEDICINE | Facility: CLINIC | Age: 69
End: 2024-06-02
Payer: MEDICARE

## 2024-06-02 DIAGNOSIS — R01.1 UNDIAGNOSED CARDIAC MURMURS: Primary | ICD-10-CM

## 2024-06-03 NOTE — TELEPHONE ENCOUNTER
I dont see that I documented any need for a cardiology follow up.  Can we get more information on what the patient needs this for?

## 2024-06-06 ENCOUNTER — PATIENT OUTREACH (OUTPATIENT)
Dept: CARE COORDINATION | Facility: CLINIC | Age: 69
End: 2024-06-06
Payer: MEDICARE

## 2024-06-10 ENCOUNTER — TRANSFERRED RECORDS (OUTPATIENT)
Dept: HEALTH INFORMATION MANAGEMENT | Facility: CLINIC | Age: 69
End: 2024-06-10
Payer: MEDICARE

## 2024-06-11 ENCOUNTER — PATIENT OUTREACH (OUTPATIENT)
Dept: CARE COORDINATION | Facility: CLINIC | Age: 69
End: 2024-06-11
Payer: MEDICARE

## 2024-07-09 ENCOUNTER — PATIENT OUTREACH (OUTPATIENT)
Dept: CARE COORDINATION | Facility: CLINIC | Age: 69
End: 2024-07-09
Payer: MEDICARE

## 2024-08-03 ENCOUNTER — HEALTH MAINTENANCE LETTER (OUTPATIENT)
Age: 69
End: 2024-08-03

## 2024-08-13 DIAGNOSIS — I10 BENIGN ESSENTIAL HYPERTENSION: ICD-10-CM

## 2024-08-13 DIAGNOSIS — E78.5 HYPERLIPIDEMIA LDL GOAL <100: ICD-10-CM

## 2024-08-13 DIAGNOSIS — G47.00 INSOMNIA, UNSPECIFIED TYPE: ICD-10-CM

## 2024-08-13 RX ORDER — ATORVASTATIN CALCIUM 20 MG/1
20 TABLET, FILM COATED ORAL DAILY
Qty: 90 TABLET | Refills: 0 | Status: SHIPPED | OUTPATIENT
Start: 2024-08-13

## 2024-08-13 RX ORDER — LISINOPRIL AND HYDROCHLOROTHIAZIDE 12.5; 2 MG/1; MG/1
1 TABLET ORAL DAILY
Qty: 90 TABLET | Refills: 0 | Status: SHIPPED | OUTPATIENT
Start: 2024-08-13

## 2024-08-13 RX ORDER — ZOLPIDEM TARTRATE 5 MG/1
5 TABLET ORAL
Qty: 10 TABLET | Refills: 0 | Status: SHIPPED | OUTPATIENT
Start: 2024-08-13

## 2024-08-26 ENCOUNTER — OFFICE VISIT (OUTPATIENT)
Dept: CARDIOLOGY | Facility: CLINIC | Age: 69
End: 2024-08-26
Payer: MEDICARE

## 2024-08-26 ENCOUNTER — LAB (OUTPATIENT)
Dept: LAB | Facility: CLINIC | Age: 69
End: 2024-08-26
Payer: MEDICARE

## 2024-08-26 VITALS
HEIGHT: 65 IN | OXYGEN SATURATION: 99 % | DIASTOLIC BLOOD PRESSURE: 70 MMHG | BODY MASS INDEX: 33.26 KG/M2 | SYSTOLIC BLOOD PRESSURE: 154 MMHG | WEIGHT: 199.6 LBS | HEART RATE: 71 BPM

## 2024-08-26 DIAGNOSIS — I49.1 PREMATURE ATRIAL CONTRACTION: ICD-10-CM

## 2024-08-26 DIAGNOSIS — I35.1 AORTIC EJECTION MURMUR: Primary | ICD-10-CM

## 2024-08-26 DIAGNOSIS — R03.0 ELEVATED BLOOD PRESSURE READING WITHOUT DIAGNOSIS OF HYPERTENSION: ICD-10-CM

## 2024-08-26 DIAGNOSIS — R01.1 UNDIAGNOSED CARDIAC MURMURS: ICD-10-CM

## 2024-08-26 DIAGNOSIS — I49.9 IRREGULAR HEART BEAT: ICD-10-CM

## 2024-08-26 LAB
ANION GAP SERPL CALCULATED.3IONS-SCNC: 15 MMOL/L (ref 7–15)
BUN SERPL-MCNC: 10.3 MG/DL (ref 8–23)
CALCIUM SERPL-MCNC: 9.9 MG/DL (ref 8.8–10.4)
CHLORIDE SERPL-SCNC: 99 MMOL/L (ref 98–107)
CREAT SERPL-MCNC: 0.6 MG/DL (ref 0.51–0.95)
EGFRCR SERPLBLD CKD-EPI 2021: >90 ML/MIN/1.73M2
GLUCOSE SERPL-MCNC: 92 MG/DL (ref 70–99)
HCO3 SERPL-SCNC: 25 MMOL/L (ref 22–29)
MAGNESIUM SERPL-MCNC: 2.1 MG/DL (ref 1.7–2.3)
POTASSIUM SERPL-SCNC: 3.8 MMOL/L (ref 3.4–5.3)
SODIUM SERPL-SCNC: 139 MMOL/L (ref 135–145)
TSH SERPL DL<=0.005 MIU/L-ACNC: 3.4 UIU/ML (ref 0.3–4.2)

## 2024-08-26 PROCEDURE — 36415 COLL VENOUS BLD VENIPUNCTURE: CPT | Performed by: INTERNAL MEDICINE

## 2024-08-26 PROCEDURE — 84443 ASSAY THYROID STIM HORMONE: CPT | Performed by: INTERNAL MEDICINE

## 2024-08-26 PROCEDURE — 80048 BASIC METABOLIC PNL TOTAL CA: CPT | Performed by: INTERNAL MEDICINE

## 2024-08-26 PROCEDURE — 83735 ASSAY OF MAGNESIUM: CPT | Performed by: INTERNAL MEDICINE

## 2024-08-26 PROCEDURE — 93000 ELECTROCARDIOGRAM COMPLETE: CPT | Performed by: INTERNAL MEDICINE

## 2024-08-26 PROCEDURE — 99204 OFFICE O/P NEW MOD 45 MIN: CPT | Performed by: INTERNAL MEDICINE

## 2024-08-26 NOTE — PROGRESS NOTES
HPI and Plan:   It is my pleasure to see your patient Yessy Kuhn in consultation for assessment of a murmur.    This is a pleasant 69-year-old patient who has been noted to have a murmur since she was 20 years of age.  She is entirely asymptomatic with this with no chest pains, no chest pressure, no unusual shortness of breath and no syncope or near syncope.  Both her mother and brother have a history of murmurs but the causes are unclear to the patient.  She has not had an echocardiogram performed that I can see.    Her blood pressure was raised here today but she does say that she suffers from whitecoat hypertension.  Her blood pressure did drop 20 points on recheck but was still on the higher side.  She is being treated for essential hypertension.  Twelve-lead electrocardiogram which I personally reviewed today shows that the patient has PACs.  Otherwise, the EKG is within normal limits.    I did notice on her pulse that she has fairly frequent extrasystoles which are PACs as I mentioned above on the EKG.    Impression:  1.  Systolic ejection murmur heard in the aortic area.  This most likely represents aortic sclerosis rather than stenosis as there is preservation of the second heart sound.  2.  Frequent extrasystoles most likely due to PACs found on the twelve-lead electrocardiogram.  3.  Whitecoat hypertension.  However we need to determine if her blood pressure is well-controlled.    Plan:  1.  I have ordered an echocardiogram to determine the nature of the murmur.  2.  Out of the patient wear a Holter monitor to determine how frequent the PACs are what percentage of her beats are PACs.  3.  I would have the patient wear a 24-hour blood pressure monitor to determine if her blood pressure actually is raised.  4.  I will order a basic metabolic profile, magnesium and TSH to determine if the patient has any electrolyte or hormonal abnormality causing the PACs.  The echocardiogram will also be important in this  respect as well    As always the patient is been told to contact me if she is any questions or any concerns.    Paramjit Smith MD, FACC, FRCPI      Orders Placed This Encounter   Procedures    TSH    Basic metabolic panel    Magnesium    Follow-Up with Cardiology BRISEIDA    EKG 12-lead complete w/read - Clinics (performed today)    Holter Monitor 24 hour Adult Pediatric    24 Hour Blood Pressure Monitor - Adult    Echocardiogram Complete       Orders Placed This Encounter   Medications    Misc Natural Products (BEET ROOT PO)     Sig: Take by mouth 2 times daily. OTC    Nutritional Supplements (SALMON OIL PO)     Sig: Take by mouth daily. OTC       There are no discontinued medications.      Encounter Diagnoses   Name Primary?    Undiagnosed cardiac murmurs     Aortic ejection murmur Yes    Elevated blood pressure reading without diagnosis of hypertension     Premature atrial contraction     Irregular heart beat        CURRENT MEDICATIONS:  Current Outpatient Medications   Medication Sig Dispense Refill    acetaminophen (TYLENOL) 325 MG tablet Take 650 mg by mouth 2 times daily as needed for mild pain      atorvastatin (LIPITOR) 20 MG tablet TAKE 1 TABLET(20 MG) BY MOUTH DAILY 90 tablet 0    calcium carbonate 600 mg-vitamin D 400 units (CALTRATE) 600-400 MG-UNIT per tablet Take 1 tablet by mouth 2 times daily      cyanocobalamin (VITAMIN B-12) 500 MCG tablet Take 500 mcg by mouth daily      lisinopril-hydrochlorothiazide (ZESTORETIC) 20-12.5 MG tablet TAKE 1 TABLET BY MOUTH DAILY 90 tablet 0    LORazepam (ATIVAN) 0.5 MG tablet Take 1 tablet (0.5 mg) by mouth every 6 hours as needed for anxiety 20 tablet 0    melatonin 5 MG tablet Take 10 mg by mouth nightly as needed for sleep (2 x 5mg = 10mg)      Misc Natural Products (BEET ROOT PO) Take by mouth 2 times daily. OTC      Nutritional Supplements (SALMON OIL PO) Take by mouth daily. OTC      zolpidem (AMBIEN) 5 MG tablet TAKE 1 TABLET(5 MG) BY MOUTH EVERY NIGHT AS  NEEDED FOR SLEEP (Patient not taking: Reported on 8/26/2024) 10 tablet 0       ALLERGIES     Allergies   Allergen Reactions    Antihistamine [Antihistamines, Chlorpheniramine-Type]      Emotional reactions    Diphenhydramine Unknown     Notes: ANXIETY;    Latex Unknown       PAST MEDICAL HISTORY:  Past Medical History:   Diagnosis Date    Arthritis     Hypertension     Irritable bowel syndrome     constipation    Mitral valve prolapse     PONV (postoperative nausea and vomiting)     Presence of intrauterine contraceptive device 1997    removed 2014       PAST SURGICAL HISTORY:  Past Surgical History:   Procedure Laterality Date    ABDOMEN SURGERY  2/91    ARTHROPLASTY KNEE Right 03/11/2019    Procedure: RIGHT TOTAL KNEE ARTHROPLASTY;  Surgeon: Tate Cohn MD;  Location: [facility] OR    CHOLECYSTECTOMY, OPEN  01/01/1991    COLONOSCOPY  2014       FAMILY HISTORY:  Family History   Problem Relation Age of Onset    Respiratory Mother         emphysema - heavy smoker    Depression Mother     Substance Abuse Mother     Cancer Father         bladder and lung at age 50    Other Cancer Father         Heavy smoker; bladder, lung, pancreatic    Coronary Artery Disease Sister         Age 73, heavy smoker, poor health, copd    Cerebrovascular Disease Sister         TIA Jan 2023    Depression Sister     Substance Abuse Brother     Substance Abuse Maternal Grandfather     Diabetes Paternal Grandmother         adult onset    Arthritis Sister     Breast Cancer Cousin     Substance Abuse Brother        SOCIAL HISTORY:  Social History     Socioeconomic History    Marital status:      Spouse name: Sam    Number of children: 3    Years of education: 16    Highest education level: None   Occupational History    Occupation: Day Care Provider   Tobacco Use    Smoking status: Never    Smokeless tobacco: Never   Substance and Sexual Activity    Alcohol use: No    Drug use: No     Comment: CBD gummies for sleep    Sexual  "activity: Never     Partners: Male   Other Topics Concern     Service No    Blood Transfusions No    Caffeine Concern Yes     Comment: 4-6 cups of coffee per day and 2 sodas per day    Occupational Exposure No    Hobby Hazards No    Sleep Concern Yes     Comment: difficulty getting to sleep    Stress Concern Yes     Comment: teenagers at home    Weight Concern Yes    Special Diet No    Back Care No    Exercise No    Bike Helmet No    Seat Belt Yes    Self-Exams Yes     Comment: occasional    Parent/sibling w/ CABG, MI or angioplasty before 65F 55M? No     Social Determinants of Health     Financial Resource Strain: Low Risk  (1/16/2024)    Financial Resource Strain     Within the past 12 months, have you or your family members you live with been unable to get utilities (heat, electricity) when it was really needed?: No   Food Insecurity: Low Risk  (1/16/2024)    Food Insecurity     Within the past 12 months, did you worry that your food would run out before you got money to buy more?: No     Within the past 12 months, did the food you bought just not last and you didn t have money to get more?: No   Transportation Needs: Low Risk  (1/16/2024)    Transportation Needs     Within the past 12 months, has lack of transportation kept you from medical appointments, getting your medicines, non-medical meetings or appointments, work, or from getting things that you need?: No   Housing Stability: Low Risk  (1/16/2024)    Housing Stability     Do you have housing? : Yes     Are you worried about losing your housing?: No       Review of Systems:  Skin:          Eyes:         ENT:         Respiratory:          Cardiovascular:         Gastroenterology:        Genitourinary:         Musculoskeletal:         Neurologic:         Psychiatric:         Heme/Lymph/Imm:         Endocrine:           Physical Exam:  Vitals: BP (!) 154/70   Pulse 71   Ht 1.638 m (5' 4.5\")   Wt 90.5 kg (199 lb 9.6 oz)   LMP 04/17/2007 " (Approximate)   SpO2 99%   BMI 33.73 kg/m      Constitutional:  cooperative, alert and oriented, well developed, well nourished, in no acute distress thin      Skin:  warm and dry to the touch          Head:  normocephalic        Eyes:  pupils equal and round        Lymph:      ENT:  no pallor or cyanosis, dentition good        Neck:  carotid pulses are full and equal bilaterally, JVP normal, no carotid bruit        Respiratory:  normal breath sounds, clear to auscultation, normal A-P diameter, normal symmetry, normal respiratory excursion, no use of accessory muscles         Cardiac: regular rhythm;normal S1 and S2 frequent premature beats     systolic ejection murmur;grade 2;RUSB                                                 GI:  not assessed this visit        Extremities and Muscular Skeletal:  no deformities, clubbing, cyanosis, erythema observed;no edema              Neurological:  no gross motor deficits;affect appropriate        Psych:  Alert and Oriented x 3        CC  Referred Self, MD  No address on file

## 2024-08-26 NOTE — LETTER
8/26/2024    Benji Damon PA-C  [address]  [place] [address]    RE: Yessy Kuhn       Dear Colleague,     I had the pleasure of seeing Yessy Kuhn in the [facility].  HPI and Plan:   It is my pleasure to see your patient Yessy Kuhn in consultation for assessment of a murmur.    This is a pleasant 69-year-old patient who has been noted to have a murmur since she was 20 years of age.  She is entirely asymptomatic with this with no chest pains, no chest pressure, no unusual shortness of breath and no syncope or near syncope.  Both her mother and brother have a history of murmurs but the causes are unclear to the patient.  She has not had an echocardiogram performed that I can see.    Her blood pressure was raised here today but she does say that she suffers from whitecoat hypertension.  Her blood pressure did drop 20 points on recheck but was still on the higher side.  She is being treated for essential hypertension.  Twelve-lead electrocardiogram which I personally reviewed today shows that the patient has PACs.  Otherwise, the EKG is within normal limits.    I did notice on her pulse that she has fairly frequent extrasystoles which are PACs as I mentioned above on the EKG.    Impression:  1.  Systolic ejection murmur heard in the aortic area.  This most likely represents aortic sclerosis rather than stenosis as there is preservation of the second heart sound.  2.  Frequent extrasystoles most likely due to PACs found on the twelve-lead electrocardiogram.  3.  Whitecoat hypertension.  However we need to determine if her blood pressure is well-controlled.    Plan:  1.  I have ordered an echocardiogram to determine the nature of the murmur.  2.  Out of the patient wear a Holter monitor to determine how frequent the PACs are what percentage of her beats are PACs.  3.  I would have the patient wear a 24-hour blood pressure monitor to determine if her blood pressure  actually is raised.  4.  I will order a basic metabolic profile, magnesium and TSH to determine if the patient has any electrolyte or hormonal abnormality causing the PACs.  The echocardiogram will also be important in this respect as well    As always the patient is been told to contact me if she is any questions or any concerns.    Paramjit Smith MD, FACC, FRCPI      Orders Placed This Encounter   Procedures     TSH     Basic metabolic panel     Magnesium     Follow-Up with Cardiology BRISEIDA     EKG 12-lead complete w/read - Clinics (performed today)     Holter Monitor 24 hour Adult Pediatric     24 Hour Blood Pressure Monitor - Adult     Echocardiogram Complete       Orders Placed This Encounter   Medications     Misc Natural Products (BEET ROOT PO)     Sig: Take by mouth 2 times daily. OTC     Nutritional Supplements (SALMON OIL PO)     Sig: Take by mouth daily. OTC       There are no discontinued medications.      Encounter Diagnoses   Name Primary?     Undiagnosed cardiac murmurs      Aortic ejection murmur Yes     Elevated blood pressure reading without diagnosis of hypertension      Premature atrial contraction      Irregular heart beat        CURRENT MEDICATIONS:  Current Outpatient Medications   Medication Sig Dispense Refill     acetaminophen (TYLENOL) 325 MG tablet Take 650 mg by mouth 2 times daily as needed for mild pain       atorvastatin (LIPITOR) 20 MG tablet TAKE 1 TABLET(20 MG) BY MOUTH DAILY 90 tablet 0     calcium carbonate 600 mg-vitamin D 400 units (CALTRATE) 600-400 MG-UNIT per tablet Take 1 tablet by mouth 2 times daily       cyanocobalamin (VITAMIN B-12) 500 MCG tablet Take 500 mcg by mouth daily       lisinopril-hydrochlorothiazide (ZESTORETIC) 20-12.5 MG tablet TAKE 1 TABLET BY MOUTH DAILY 90 tablet 0     LORazepam (ATIVAN) 0.5 MG tablet Take 1 tablet (0.5 mg) by mouth every 6 hours as needed for anxiety 20 tablet 0     melatonin 5 MG tablet Take 10 mg by mouth nightly as needed for  sleep (2 x 5mg = 10mg)       Misc Natural Products (BEET ROOT PO) Take by mouth 2 times daily. OTC       Nutritional Supplements (SALMON OIL PO) Take by mouth daily. OTC       zolpidem (AMBIEN) 5 MG tablet TAKE 1 TABLET(5 MG) BY MOUTH EVERY NIGHT AS NEEDED FOR SLEEP (Patient not taking: Reported on 8/26/2024) 10 tablet 0       ALLERGIES     Allergies   Allergen Reactions     Antihistamine [Antihistamines, Chlorpheniramine-Type]      Emotional reactions     Diphenhydramine Unknown     Notes: ANXIETY;     Latex Unknown       PAST MEDICAL HISTORY:  Past Medical History:   Diagnosis Date     Arthritis      Hypertension      Irritable bowel syndrome     constipation     Mitral valve prolapse      PONV (postoperative nausea and vomiting)      Presence of intrauterine contraceptive device 1997 removed 2014       PAST SURGICAL HISTORY:  Past Surgical History:   Procedure Laterality Date     ABDOMEN SURGERY  2/91     ARTHROPLASTY KNEE Right 03/11/2019    Procedure: RIGHT TOTAL KNEE ARTHROPLASTY;  Surgeon: Tate Cohn MD;  Location: [facility] OR     CHOLECYSTECTOMY, OPEN  01/01/1991     COLONOSCOPY  2014       FAMILY HISTORY:  Family History   Problem Relation Age of Onset     Respiratory Mother         emphysema - heavy smoker     Depression Mother      Substance Abuse Mother      Cancer Father         bladder and lung at age 50     Other Cancer Father         Heavy smoker; bladder, lung, pancreatic     Coronary Artery Disease Sister         Age 73, heavy smoker, poor health, copd     Cerebrovascular Disease Sister         TIA Jan 2023     Depression Sister      Substance Abuse Brother      Substance Abuse Maternal Grandfather      Diabetes Paternal Grandmother         adult onset     Arthritis Sister      Breast Cancer Cousin      Substance Abuse Brother        SOCIAL HISTORY:  Social History     Socioeconomic History     Marital status:      Spouse name: Sam     Number of children: 3     Years of  education: 16     Highest education level: None   Occupational History     Occupation: Day Care Provider   Tobacco Use     Smoking status: Never     Smokeless tobacco: Never   Substance and Sexual Activity     Alcohol use: No     Drug use: No     Comment: CBD gummies for sleep     Sexual activity: Never     Partners: Male   Other Topics Concern      Service No     Blood Transfusions No     Caffeine Concern Yes     Comment: 4-6 cups of coffee per day and 2 sodas per day     Occupational Exposure No     Hobby Hazards No     Sleep Concern Yes     Comment: difficulty getting to sleep     Stress Concern Yes     Comment: teenagers at home     Weight Concern Yes     Special Diet No     Back Care No     Exercise No     Bike Helmet No     Seat Belt Yes     Self-Exams Yes     Comment: occasional     Parent/sibling w/ CABG, MI or angioplasty before 65F 55M? No     Social Determinants of Health     Financial Resource Strain: Low Risk  (1/16/2024)    Financial Resource Strain      Within the past 12 months, have you or your family members you live with been unable to get utilities (heat, electricity) when it was really needed?: No   Food Insecurity: Low Risk  (1/16/2024)    Food Insecurity      Within the past 12 months, did you worry that your food would run out before you got money to buy more?: No      Within the past 12 months, did the food you bought just not last and you didn t have money to get more?: No   Transportation Needs: Low Risk  (1/16/2024)    Transportation Needs      Within the past 12 months, has lack of transportation kept you from medical appointments, getting your medicines, non-medical meetings or appointments, work, or from getting things that you need?: No   Housing Stability: Low Risk  (1/16/2024)    Housing Stability      Do you have housing? : Yes      Are you worried about losing your housing?: No       Review of Systems:  Skin:          Eyes:         ENT:         Respiratory:         "  Cardiovascular:         Gastroenterology:        Genitourinary:         Musculoskeletal:         Neurologic:         Psychiatric:         Heme/Lymph/Imm:         Endocrine:           Physical Exam:  Vitals: BP (!) 154/70   Pulse 71   Ht 1.638 m (5' 4.5\")   Wt 90.5 kg (199 lb 9.6 oz)   LMP 04/17/2007 (Approximate)   SpO2 99%   BMI 33.73 kg/m      Constitutional:  cooperative, alert and oriented, well developed, well nourished, in no acute distress thin      Skin:  warm and dry to the touch          Head:  normocephalic        Eyes:  pupils equal and round        Lymph:      ENT:  no pallor or cyanosis, dentition good        Neck:  carotid pulses are full and equal bilaterally, JVP normal, no carotid bruit        Respiratory:  normal breath sounds, clear to auscultation, normal A-P diameter, normal symmetry, normal respiratory excursion, no use of accessory muscles         Cardiac: regular rhythm;normal S1 and S2 frequent premature beats     systolic ejection murmur;grade 2;RUSB                                                 GI:  not assessed this visit        Extremities and Muscular Skeletal:  no deformities, clubbing, cyanosis, erythema observed;no edema              Neurological:  no gross motor deficits;affect appropriate        Psych:  Alert and Oriented x 3        CC  Referred Self, MD  No address on file                  Thank you for allowing me to participate in the care of your patient.      Sincerely,     Paramjit Smith MD, MD     [facility]  cc:   Lucretia Newman MD  No address on file      "

## 2024-08-29 ENCOUNTER — TRANSFERRED RECORDS (OUTPATIENT)
Dept: MULTI SPECIALTY CLINIC | Facility: CLINIC | Age: 69
End: 2024-08-29

## 2024-09-16 ENCOUNTER — HOSPITAL ENCOUNTER (OUTPATIENT)
Dept: CARDIOLOGY | Facility: CLINIC | Age: 69
Discharge: HOME | End: 2024-09-16
Attending: INTERNAL MEDICINE
Payer: MEDICARE

## 2024-09-16 DIAGNOSIS — R03.0 ELEVATED BLOOD PRESSURE READING WITHOUT DIAGNOSIS OF HYPERTENSION: ICD-10-CM

## 2024-09-16 DIAGNOSIS — I35.1 AORTIC EJECTION MURMUR: ICD-10-CM

## 2024-09-16 LAB — LVEF ECHO: NORMAL

## 2024-09-16 PROCEDURE — 93790 AMBL BP MNTR W/SW I&R: CPT | Performed by: INTERNAL MEDICINE

## 2024-09-16 PROCEDURE — 93306 TTE W/DOPPLER COMPLETE: CPT | Mod: 26 | Performed by: INTERNAL MEDICINE

## 2024-09-16 PROCEDURE — 93306 TTE W/DOPPLER COMPLETE: CPT

## 2024-09-16 PROCEDURE — 93788 AMBL BP MNTR W/SW A/R: CPT

## 2024-10-16 ENCOUNTER — MYC MEDICAL ADVICE (OUTPATIENT)
Dept: FAMILY MEDICINE | Facility: CLINIC | Age: 69
End: 2024-10-16
Payer: MEDICARE

## 2024-10-17 ENCOUNTER — NURSE TRIAGE (OUTPATIENT)
Dept: FAMILY MEDICINE | Facility: CLINIC | Age: 69
End: 2024-10-17
Payer: MEDICARE

## 2024-10-17 NOTE — TELEPHONE ENCOUNTER
"Nurse Triage SBAR    Is this a 2nd Level Triage? YES, LICENSED PRACTITIONER REVIEW IS REQUIRED    Situation: Pt called the clinic reporting severe indigestion/reflux for the last few weeks.     Background: Pt states this is a new problem for her. She has an appt scheduled with her PCP in a few weeks, but does not feel as though she can wait that long to address it.     Assessment: Pt declines any pain or a fever. She states reflux symptoms are worse after eating and with activity. She states it has begun to affect her daily activities such as exercise classes. She reports feeling a burning \"bubble\" from her sternum up into her throat. OTC meds have not been very effective (she has tried TUMS, Rolaids, omeprazole, famotidine). Rolaids give her the most relief but do not last.    Protocol Recommended Disposition:   Discuss With PCP And Callback By Nurse Today    Recommendation: Pt is requesting PCP's advise. Are you able to see pt sooner than 11/11? Or do you suggest any other measures in the meantime?    Routed to provider    Does the patient meet one of the following criteria for ADS visit consideration? 16+ years old, with an [facility] PCP     TIP  Providers, please consider if this condition is appropriate for management at one of our Acute and Diagnostic Services sites.     If patient is a good candidate, please use dotphrase <dot>triageresponse and select Refer to ADS to document.    Reason for Disposition   Nursing judgment    Additional Information   Negative: Sounds like a life-threatening emergency to the triager   Negative: Information only call about a Well Adult (no illness or injury)   Negative: Caller can't be reached by phone   Negative: Nursing judgment   Negative: Nursing judgment   Negative: Nursing judgment   Negative: Nursing judgment   Negative: Nursing judgment   Negative: Nursing judgment   Negative: Patient wants to be seen   Negative: Nursing judgment   Negative: Nursing judgment    Answer " "Assessment - Initial Assessment Questions  1. REASON FOR CALL: \"What is your main concern right now?\"      Reflux  2. ONSET: \"When did the symptoms start?\"      A few weeks ago   3. SEVERITY: \"How bad is the symptoms?\"      Worse after eating or drinking, and then moving, sitting up straight helps   4. FEVER: \"Do you have a fever?\"      No  5. OTHER SYMPTOMS: \"Do you have any other new symptoms?\"      No  6. TREATMENTS AND RESPONSE: \"What have you done so far to try to make this better? What medicines have you used?\"      (TUMS, Rolaids, omeprazole, famotidine). The Rolaids give her the most relief but do not last.   7. PREGNANCY: \"Is there any chance you are pregnant?\" \"When was your last menstrual period?\"      N/A    Protocols used: No Protocol [id number]  Thank you,  Abiola Still RN    "

## 2024-10-17 NOTE — TELEPHONE ENCOUNTER
Triage Patient Outreach    Attempt # 1    Was call answered?  No.   Left message to call clinic back    Cassandra Wu RN

## 2024-10-17 NOTE — TELEPHONE ENCOUNTER
Patient scheduled with Benji Damon PA-C for 10/30 at 11:10 am.  Patient agrees to call back if any new or worsening symptoms before that time.  Yessy Ford RN

## 2024-10-30 ENCOUNTER — OFFICE VISIT (OUTPATIENT)
Dept: FAMILY MEDICINE | Facility: CLINIC | Age: 69
End: 2024-10-30
Payer: MEDICARE

## 2024-10-30 VITALS
HEART RATE: 62 BPM | TEMPERATURE: 97.8 F | OXYGEN SATURATION: 100 % | RESPIRATION RATE: 16 BRPM | DIASTOLIC BLOOD PRESSURE: 71 MMHG | SYSTOLIC BLOOD PRESSURE: 126 MMHG

## 2024-10-30 DIAGNOSIS — E78.5 HYPERLIPIDEMIA LDL GOAL <100: ICD-10-CM

## 2024-10-30 DIAGNOSIS — Z13.0 SCREENING FOR IRON DEFICIENCY ANEMIA: ICD-10-CM

## 2024-10-30 DIAGNOSIS — E66.812 OBESITY, CLASS II, BMI 35-39.9: ICD-10-CM

## 2024-10-30 DIAGNOSIS — Z00.00 MEDICARE ANNUAL WELLNESS VISIT, SUBSEQUENT: Primary | ICD-10-CM

## 2024-10-30 DIAGNOSIS — Z13.6 CARDIOVASCULAR SCREENING; LDL GOAL LESS THAN 160: ICD-10-CM

## 2024-10-30 DIAGNOSIS — F33.1 MODERATE EPISODE OF RECURRENT MAJOR DEPRESSIVE DISORDER (H): ICD-10-CM

## 2024-10-30 DIAGNOSIS — I10 BENIGN ESSENTIAL HYPERTENSION: ICD-10-CM

## 2024-10-30 DIAGNOSIS — K21.00 GASTROESOPHAGEAL REFLUX DISEASE WITH ESOPHAGITIS, UNSPECIFIED WHETHER HEMORRHAGE: ICD-10-CM

## 2024-10-30 PROCEDURE — G0438 PPPS, INITIAL VISIT: HCPCS | Performed by: PHYSICIAN ASSISTANT

## 2024-10-30 PROCEDURE — 87338 HPYLORI STOOL AG IA: CPT | Performed by: PHYSICIAN ASSISTANT

## 2024-10-30 PROCEDURE — 99214 OFFICE O/P EST MOD 30 MIN: CPT | Mod: 25 | Performed by: PHYSICIAN ASSISTANT

## 2024-10-30 RX ORDER — PANTOPRAZOLE SODIUM 40 MG/1
40 TABLET, DELAYED RELEASE ORAL DAILY
Qty: 90 TABLET | Refills: 1 | Status: SHIPPED | OUTPATIENT
Start: 2024-10-30

## 2024-10-30 RX ORDER — AMOXICILLIN 500 MG/1
CAPSULE ORAL
COMMUNITY
Start: 2024-10-07

## 2024-10-30 RX ORDER — MULTIVITAMIN WITH IRON
1 TABLET ORAL DAILY
COMMUNITY

## 2024-10-30 ASSESSMENT — PATIENT HEALTH QUESTIONNAIRE - PHQ9
SUM OF ALL RESPONSES TO PHQ QUESTIONS 1-9: 2
SUM OF ALL RESPONSES TO PHQ QUESTIONS 1-9: 2
10. IF YOU CHECKED OFF ANY PROBLEMS, HOW DIFFICULT HAVE THESE PROBLEMS MADE IT FOR YOU TO DO YOUR WORK, TAKE CARE OF THINGS AT HOME, OR GET ALONG WITH OTHER PEOPLE: NOT DIFFICULT AT ALL

## 2024-10-30 ASSESSMENT — PAIN SCALES - GENERAL: PAINLEVEL_OUTOF10: NO PAIN (0)

## 2024-10-30 NOTE — PROGRESS NOTES
"  Assessment & Plan     Medicare annual wellness visit, subsequent      Gastroesophageal reflux disease with esophagitis, unspecified whether hemorrhage  Will first check for H pylori.  Ok to begin protonix daily  EGD ordered to assess cause of GERD, chest discomfort  - Helicobacter pylori Antigen Stool; Future  - pantoprazole (PROTONIX) 40 MG EC tablet; Take 1 tablet (40 mg) by mouth daily.  - Adult GI  Referral - Procedure Only; Future  - Helicobacter pylori Antigen Stool    Hyperlipidemia LDL goal <100  stable  - atorvastatin (LIPITOR) 20 MG tablet; Take 1 tablet (20 mg) by mouth daily.    Benign essential hypertension  stable  - lisinopril-hydrochlorothiazide (ZESTORETIC) 20-12.5 MG tablet; Take 1 tablet by mouth daily.    Moderate episode of recurrent major depressive disorder (H)  stable    Obesity, Class II, BMI 35-39.9  monitoring    CARDIOVASCULAR SCREENING; LDL GOAL LESS THAN 160  - Lipid Profile (Chol, Trig, HDL, LDL calc); Future  - Basic metabolic panel  (Ca, Cl, CO2, Creat, Gluc, K, Na, BUN); Future    Screening for iron deficiency anemia  - CBC with platelets and differential; Future          BMI  Estimated body mass index is 33.73 kg/m  as calculated from the following:    Height as of 8/26/24: 1.638 m (5' 4.5\").    Weight as of 8/26/24: 90.5 kg (199 lb 9.6 oz).         Follow up after EGD    Brian Krishnamurthy is a 69 year old, presenting for the following health issues:  Gastrointestinal Problem, Hypertension, Follow Up, and Wellness Visit        10/30/2024    11:14 AM   Additional Questions   Roomed by Na TALAMANTES         10/30/2024   Declines Weight   Did patient decline having their weight taken? Yes        Via the Health Maintenance questionnaire, the patient has reported the following services have been completed , this information has been sent to the abstraction team.  History of Present Illness       Reason for visit:  Reflux  Symptom onset:  More than a month  Symptoms include:  Pain " and pressure, gas  Symptom intensity:  Moderate  Symptom progression:  Staying the same  Had these symptoms before:  No  What makes it worse:  Exercise  What makes it better:  Sitting upright and still   She is taking medications regularly.         Hypertension Follow-up    Do you check your blood pressure regularly outside of the clinic? Yes   Are you following a low salt diet? Yes  Are your blood pressures ever more than 140 on the top number (systolic) OR more   than 90 on the bottom number (diastolic), for example 140/90? Yes  How many servings of fruits and vegetables do you eat daily?  2-3  On average, how many sweetened beverages do you drink each day (Examples: soda, juice, sweet tea, etc.  Do NOT count diet or artificially sweetened beverages)?   0  How many days per week do you exercise enough to make your heart beat faster? 3 or less  How many minutes a day do you exercise enough to make your heart beat faster? 30 - 60  How many days per week do you miss taking your medication? 0    Yessy has been experiencing worsening GERD symptoms for several weeks.  She feels a heaviness sensation in the center of her chest that is worse with bending laying flat and jumping.  No dysphagia.  Omeprazole is not helping much,  She has not had EGD.  No bloating, early satiety, or weight loss noted  Annual Wellness Visit     Patient has been advised of split billing requirements and indicates understanding: Yes     Health Care Directive  Patient has a Health Care Directive on file  Advance care planning document is on file and is current.  In general, how would you rate your overall physical health? (!) FAIR   Discussed with patient their rating of physical health; information has been provided.   Do you have a special diet?  Regular (no restrictions)        10/30/2024   Exercise, Social Connection, Stress   Days per week of moderate/strenous exercise 3 days   Average minutes spent exercising at this level 40 min    Frequency of gathering with friends or relatives Twice   Feel stress (tense, anxious, or unable to sleep) Not at all        Do you see a dentist two times every year?  Yes  Have you been more tired than usual lately?  No  If you drink alcohol do you typically have >3 drinks per day or >7 drinks per week? No  Do you have a current opioid prescription? No  Do you use any other controlled substances or medications that are not prescribed by a provider? None  Social History     Tobacco Use    Smoking status: Never    Smokeless tobacco: Never   Vaping Use    Vaping status: Never Used   Substance Use Topics    Alcohol use: No    Drug use: No     Comment: CBD gummies for sleep       Needs assistance for the following daily activities: no assistance needed  Which of the following safety concerns are present in your home?  none identified   Do you (or your family members) have any concerns about your safety while driving?  No  Do you have any of the following hearing concerns?: No hearing concerns  In the past 6 months, have you been bothered by leaking of urine? No        1/16/2024   Social Factors   Worry food won't last until get money to buy more No   Food not last or not have enough money for food? No   Do you have housing? (Housing is defined as stable permanent housing and does not include staying ouside in a car, in a tent, in an abandoned building, in an overnight shelter, or couch-surfing.) Yes   Are you worried about losing your housing? No   Lack of transportation? No   Unable to get utilities (heat,electricity)? No            10/25/2024   Fall Risk   Fallen 2 or more times in the past year? No    Trouble with walking or balance? No        Patient-reported        Today's PHQ-9 Score:       10/30/2024    11:08 AM   PHQ-9 SCORE   PHQ-9 Total Score MyChart 2 (Minimal depression)   PHQ-9 Total Score 2        Patient-reported        Mammogram Screening - Annual screen due to greater than 20% lifetime risk as  estimated by Breast Cancer Risk Calculator      History of abnormal Pap smear: No - age 65 or older with adequate negative prior screening test results (3 consecutive negative cytology results, 2 consecutive negative cotesting results, or 2 consecutive negative HrHPV test results within 10 years, with the most recent test occurring within the recommended screening interval for the test used)        4/18/2016    12:00 AM 12/18/2006     3:19 PM   PAP / HPV   PAP (Historical) NIL  NIL      ASCVD Risk   The 10-year ASCVD risk score (Caesar KIM, et al., 2019) is: 6.9%    Values used to calculate the score:      Age: 69 years      Sex: Female      Is Non- : No      Diabetic: No      Tobacco smoker: No      Systolic Blood Pressure: 126 mmHg      Is BP treated: Yes      HDL Cholesterol: 78 mg/dL      Total Cholesterol: 141 mg/dL          Reviewed and updated as needed this visit by Provider   Tobacco   Meds   Med Hx  Surg Hx  Fam Hx            Past Medical History:   Diagnosis Date    Arthritis     Hypertension     Irritable bowel syndrome     constipation    Mitral valve prolapse     PONV (postoperative nausea and vomiting)     Presence of intrauterine contraceptive device 1997    removed 2014     Past Surgical History:   Procedure Laterality Date    ABDOMEN SURGERY  2/91    ARTHROPLASTY KNEE Right 03/11/2019    Procedure: RIGHT TOTAL KNEE ARTHROPLASTY;  Surgeon: Tate Cohn MD;  Location: [facility] OR    CHOLECYSTECTOMY, OPEN  01/01/1991    COLONOSCOPY  2014     OB History   No obstetric history on file.       Current providers sharing in care for this patient include:  Patient Care Team:  Benji Damon PA-C as PCP - General (Family Medicine)  Benji Damon PA-C as Assigned PCP  Paramjit Smith MD as MD (Cardiovascular Disease)  Paramjit Smith MD as Assigned Heart and Vascular Provider    The following health maintenance items are  reviewed in Epic and correct as of today:  Health Maintenance   Topic Date Due    MEDICARE ANNUAL WELLNESS VISIT  06/19/2024    LIPID  06/19/2024    PHQ-9  04/30/2025    BMP  08/26/2025    MAMMO SCREENING  10/09/2025    ANNUAL REVIEW OF HM ORDERS  10/30/2025    FALL RISK ASSESSMENT  10/30/2025    DTAP/TDAP/TD IMMUNIZATION (3 - Td or Tdap) 04/18/2026    GLUCOSE  08/26/2027    ADVANCE CARE PLANNING  11/01/2029    DEXA  08/01/2033    COLORECTAL CANCER SCREENING  08/29/2034    DEPRESSION ACTION PLAN  Completed    INFLUENZA VACCINE  Completed    Pneumococcal Vaccine: 65+ Years  Completed    ZOSTER IMMUNIZATION  Completed    RSV VACCINE  Completed    COVID-19 Vaccine  Completed    HPV IMMUNIZATION  Aged Out    MENINGITIS IMMUNIZATION  Aged Out    RSV MONOCLONAL ANTIBODY  Aged Out    HEPATITIS C SCREENING  Discontinued    PAP  Discontinued       Appropriate preventive services were discussed with this patient, including applicable screening as appropriate for fall prevention, nutrition, physical activity, Tobacco-use cessation, weight loss and cognition.  Checklist reviewing preventive services available has been given to the patient.        10/30/2024   Mini Cog   Clock Draw Score 2 Normal   3 Item Recall 3 objects recalled   Mini Cog Total Score 5                   Review of Systems  Constitutional, HEENT, cardiovascular, pulmonary, GI, , musculoskeletal, neuro, skin, endocrine and psych systems are negative, except as otherwise noted.      Objective    /71 (BP Location: Left arm, Patient Position: Sitting, Cuff Size: Adult Large)   Pulse 62   Temp 97.8  F (36.6  C) (Tympanic)   Resp 16   LMP 04/17/2007 (Approximate)   SpO2 100%   There is no height or weight on file to calculate BMI.  Physical Exam   GENERAL: alert and no distress  EYES: Eyes grossly normal to inspection, PERRL and conjunctivae and sclerae normal  HENT: ear canals and TM's normal, nose and mouth without ulcers or lesions  NECK: no  adenopathy, no asymmetry, masses, or scars  RESP: lungs clear to auscultation - no rales, rhonchi or wheezes  CV: regular rate and rhythm, normal S1 S2, no S3 or S4, no murmur, click or rub, no peripheral edema   ABDOMEN: soft, nontender, no hepatosplenomegaly, no masses and bowel sounds normal  PSYCH: mentation appears normal, affect normal/bright            Signed Electronically by: Benji Damon PA-C

## 2024-10-31 ENCOUNTER — TELEPHONE (OUTPATIENT)
Dept: FAMILY MEDICINE | Facility: CLINIC | Age: 69
End: 2024-10-31
Payer: MEDICARE

## 2024-10-31 LAB — H PYLORI AG STL QL IA: NEGATIVE

## 2024-10-31 NOTE — TELEPHONE ENCOUNTER
Order/Referral Request    Who is requesting: pt    Orders being requested: endoscopy    Reason service is needed/diagnosis: pcp wanted her to schedule this. Pt was told she needs referral first    When are orders needed by: asap    Has this been discussed with Provider: Yes    Does patient have a preference on a Group/Provider/Facility? [facility] in [place]     Does patient have an appointment scheduled?: No    Where to send orders: Fax: [phone]    Could we send this information to you in [place] or would you prefer to receive a phone call?:   Patient would like to be contacted via [employer]

## 2024-11-01 RX ORDER — LISINOPRIL AND HYDROCHLOROTHIAZIDE 12.5; 2 MG/1; MG/1
1 TABLET ORAL DAILY
Qty: 90 TABLET | Refills: 1 | Status: SHIPPED | OUTPATIENT
Start: 2024-11-01

## 2024-11-01 RX ORDER — ATORVASTATIN CALCIUM 20 MG/1
20 TABLET, FILM COATED ORAL DAILY
Qty: 90 TABLET | Refills: 0 | Status: SHIPPED | OUTPATIENT
Start: 2024-11-01

## 2024-11-01 NOTE — RESULT ENCOUNTER NOTE
Yessy-  Here are your recent results.     Your H pylori testing is negative at this time.  I will reach out when I have the results of your endoscopy.    Benji

## 2024-11-04 ENCOUNTER — TRANSFERRED RECORDS (OUTPATIENT)
Dept: HEALTH INFORMATION MANAGEMENT | Facility: CLINIC | Age: 69
End: 2024-11-04

## 2024-11-14 ENCOUNTER — TELEPHONE (OUTPATIENT)
Dept: CARDIOLOGY | Facility: CLINIC | Age: 69
End: 2024-11-14

## 2024-11-14 NOTE — TELEPHONE ENCOUNTER
Attempted to call patient yesterday, left voicemail that patient does need the Holter monitor and advised to call scheduling to arrange.  Patient called back today, as states she did not go to her appointment as she was advised she needed the Holter monitor first.  Patient advised to call scheduling to arrange Holter monitor followed by an BRISEIDA follow-up.  MARKOS Tomlin RN

## 2024-11-14 NOTE — TELEPHONE ENCOUNTER
"----- Message from Edelmira TALAMANTES sent at 11/12/2024  3:39 PM CST -----  Regarding: Holter Monitor  Patient is scheduled to see Dalia on 11/14. She saw Dr. Smith 8/26 at which time he ordered Echo, Holter, 24-hour BP monitor and labs. All have been completed but the Holter. When I look at the notes on the orders it states that \"Pt states provider did not want this\". I can't find any notes anywhere that said he no longer what this completed. Please verify if he still wants this. Once I hear from you I will contact the other team to reschedule the OV if need be until after the Holter is completed.     Thanks!   WINIFRED Hickey  "

## 2024-11-21 ENCOUNTER — HOSPITAL ENCOUNTER (OUTPATIENT)
Dept: CARDIOLOGY | Facility: CLINIC | Age: 69
Discharge: HOME | End: 2024-11-21
Attending: INTERNAL MEDICINE
Payer: MEDICARE

## 2024-11-21 DIAGNOSIS — I49.1 PREMATURE ATRIAL CONTRACTION: ICD-10-CM

## 2024-11-21 PROCEDURE — 93225 XTRNL ECG REC<48 HRS REC: CPT

## 2024-11-21 PROCEDURE — 93226 XTRNL ECG REC<48 HR SCAN A/R: CPT

## 2024-11-27 ENCOUNTER — LAB (OUTPATIENT)
Dept: LAB | Facility: CLINIC | Age: 69
End: 2024-11-27
Payer: MEDICARE

## 2024-11-27 DIAGNOSIS — Z13.6 CARDIOVASCULAR SCREENING; LDL GOAL LESS THAN 160: ICD-10-CM

## 2024-11-27 DIAGNOSIS — Z13.0 SCREENING FOR IRON DEFICIENCY ANEMIA: ICD-10-CM

## 2024-11-27 LAB
ANION GAP SERPL CALCULATED.3IONS-SCNC: 8 MMOL/L (ref 7–15)
BASOPHILS # BLD AUTO: 0 10E3/UL (ref 0–0.2)
BASOPHILS NFR BLD AUTO: 1 %
BUN SERPL-MCNC: 13.3 MG/DL (ref 8–23)
CALCIUM SERPL-MCNC: 9.8 MG/DL (ref 8.8–10.4)
CHLORIDE SERPL-SCNC: 100 MMOL/L (ref 98–107)
CHOLEST SERPL-MCNC: 145 MG/DL
CREAT SERPL-MCNC: 0.56 MG/DL (ref 0.51–0.95)
EGFRCR SERPLBLD CKD-EPI 2021: >90 ML/MIN/1.73M2
EOSINOPHIL # BLD AUTO: 0 10E3/UL (ref 0–0.7)
EOSINOPHIL NFR BLD AUTO: 1 %
ERYTHROCYTE [DISTWIDTH] IN BLOOD BY AUTOMATED COUNT: 13.6 % (ref 10–15)
FASTING STATUS PATIENT QL REPORTED: YES
FASTING STATUS PATIENT QL REPORTED: YES
GLUCOSE SERPL-MCNC: 81 MG/DL (ref 70–99)
HCO3 SERPL-SCNC: 29 MMOL/L (ref 22–29)
HCT VFR BLD AUTO: 38 % (ref 35–47)
HDLC SERPL-MCNC: 73 MG/DL
HGB BLD-MCNC: 12.5 G/DL (ref 11.7–15.7)
IMM GRANULOCYTES # BLD: 0 10E3/UL
IMM GRANULOCYTES NFR BLD: 0 %
LDLC SERPL CALC-MCNC: 62 MG/DL
LYMPHOCYTES # BLD AUTO: 1 10E3/UL (ref 0.8–5.3)
LYMPHOCYTES NFR BLD AUTO: 29 %
MCH RBC QN AUTO: 33.3 PG (ref 26.5–33)
MCHC RBC AUTO-ENTMCNC: 32.9 G/DL (ref 31.5–36.5)
MCV RBC AUTO: 101 FL (ref 78–100)
MONOCYTES # BLD AUTO: 0.3 10E3/UL (ref 0–1.3)
MONOCYTES NFR BLD AUTO: 9 %
NEUTROPHILS # BLD AUTO: 2.2 10E3/UL (ref 1.6–8.3)
NEUTROPHILS NFR BLD AUTO: 61 %
NONHDLC SERPL-MCNC: 72 MG/DL
PLATELET # BLD AUTO: 198 10E3/UL (ref 150–450)
POTASSIUM SERPL-SCNC: 4 MMOL/L (ref 3.4–5.3)
RBC # BLD AUTO: 3.75 10E6/UL (ref 3.8–5.2)
SODIUM SERPL-SCNC: 137 MMOL/L (ref 135–145)
TRIGL SERPL-MCNC: 51 MG/DL
WBC # BLD AUTO: 3.6 10E3/UL (ref 4–11)

## 2024-12-01 ENCOUNTER — TELEPHONE (OUTPATIENT)
Dept: CARDIOLOGY | Facility: CLINIC | Age: 69
End: 2024-12-01
Payer: MEDICARE

## 2024-12-02 ENCOUNTER — PATIENT OUTREACH (OUTPATIENT)
Dept: ONCOLOGY | Facility: CLINIC | Age: 69
End: 2024-12-02
Payer: MEDICARE

## 2024-12-02 DIAGNOSIS — D70.9 NEUTROPENIA, UNSPECIFIED TYPE (H): Primary | ICD-10-CM

## 2024-12-02 NOTE — TELEPHONE ENCOUNTER
Reviewed echo showing  The left ventricle is normal in size.  Left ventricular systolic function is normal.  The visual ejection fraction is 60-65%.  No regional wall motion abnormalities noted.  Diastolic Doppler findings (E/E' ratio and/or other parameters) suggest left ventricular filling pressures are increased.  The aortic valve is trileaflet with aortic valve sclerosis. There is trace aortic regurgitation.   No hemodynamically significant valvular aortic stenosis.  The mitral valve leaflets appear thickened, but open well.   There is trace to mild mitral regurgitation.  There is no comparison study available.    Reviewed 24 hour BP monitor showing       Reviewed Holter report showing   1. Yessy Kuhn was monitored for 24 hours. Quality of tracing was good. The principal rhythm was sinus rhythm with an intermittent right bundle branch block. Average HR 57 bpm, maximum HR 96 bpm, minimum HR 44 bpm. WI interval .15 seconds, QRS duration .10-.13 seconds, QT interval .37-.47 seconds.  2. There were 125 supraventricular ectopics. 101 of these were isolated PACs. There were 4 SVE runs totaling 24 beats. The longest run was 10 beats long and had a rate of 116 bpm. The fastest run had a rate of 121 bpm and was 5 beats long.  3. The patient event button was not pressed.    Pt has appt 1/23/25 w/ Dalia [place] NP to review. Will message Dr. Smith to review. Aditi OLSON

## 2024-12-02 NOTE — PROGRESS NOTES
Hematology referral reviewed for [facility], see below.    Referral reason: leukopenia in patient, referred by PCP, has had leukopenia since 2016, no differential since 2020, no specific cell line noted to be low        Clinical question entered by referring provider or through order transcription: low white cell count    Referral received via: Internal referral by [facility]    Current abnormal labs: Available in Chart Review    Outreach: MyChart sent to patient    Plan: Triage instructions updated and sent to NPS for completion.

## 2024-12-02 NOTE — TELEPHONE ENCOUNTER
Results and recommendations are sent to patient via result note with request to continue current meds and to follow-up in January as scheduled.  Patient advised to reply or call with questions or concerns.  MARKOS Tomlin RN

## 2024-12-02 NOTE — TELEPHONE ENCOUNTER
Murmur due to aortic sclerosis.  Infrequent PACs.  Short runs of SVT.  Blood pressure is normal to low.  Will see in January.

## 2024-12-07 ENCOUNTER — MYC REFILL (OUTPATIENT)
Dept: FAMILY MEDICINE | Facility: CLINIC | Age: 69
End: 2024-12-07
Payer: MEDICARE

## 2024-12-07 DIAGNOSIS — F41.9 ANXIETY: ICD-10-CM

## 2024-12-10 RX ORDER — LORAZEPAM 0.5 MG/1
0.5 TABLET ORAL EVERY 6 HOURS PRN
Qty: 20 TABLET | Refills: 0 | Status: SHIPPED | OUTPATIENT
Start: 2024-12-10

## 2025-01-07 ENCOUNTER — MYC MEDICAL ADVICE (OUTPATIENT)
Dept: FAMILY MEDICINE | Facility: CLINIC | Age: 70
End: 2025-01-07
Payer: MEDICARE

## 2025-01-14 ENCOUNTER — NURSE TRIAGE (OUTPATIENT)
Dept: FAMILY MEDICINE | Facility: CLINIC | Age: 70
End: 2025-01-14
Payer: MEDICARE

## 2025-01-14 NOTE — TELEPHONE ENCOUNTER
"To Benji Damon    Nurse Triage SBAR    Is this a 2nd Level Triage? NO    Situation: pt called requesting increase in protonix due to no change in symptoms since visit with you on 10/30/24.    Background:  pt having daily epigastric/esophageal pain which is worse with eating and activity. States protonix has not improved symptoms at all.     Assessment:  denies cardiac symptoms or any other symptoms.     Protocol Recommended Disposition:   See in Office Within 2 Weeks    Recommendation:  Can pts protonix dose be adjusted or should she be seen in office for this?    Routed to provider    Does the patient meet one of the following criteria for ADS visit consideration? 16+ years old, with an [facility] PCP     TIP  Providers, please consider if this condition is appropriate for management at one of our Acute and Diagnostic Services sites.     If patient is a good candidate, please use dotphrase <dot>triageresponse and select Refer to ADS to document.  Put on pantoprazole by Dr. Frederick. Pantoprazole not working.   Reason for Disposition   Abdominal pain is a chronic symptom (recurrent or ongoing AND lasting > 4 weeks)    Answer Assessment - Initial Assessment Questions  1. LOCATION: \"Where does it hurt?\"       epigastric  2. RADIATION: \"Does the pain shoot anywhere else?\" (e.g., chest, back)     esophagus  3. ONSET: \"When did the pain begin?\" (e.g., minutes, hours or days ago)       About a year, just prescribe pantoprazole in November  4. SUDDEN: \"Gradual or sudden onset?\"      gradual  5. PATTERN \"Does the pain come and go, or is it constant?\"      Comes and goes, better at rest  6. SEVERITY: \"How bad is the pain?\"  (e.g., Scale 1-10; mild, moderate, or severe)      1-2 at rest, 5-8/10 with activity  7. RECURRENT SYMPTOM: \"Have you ever had this type of stomach pain before?\" If Yes, ask: \"When was the last time?\" and \"What happened that time?\"       Having this for one year  8. AGGRAVATING FACTORS: \"Does anything seem to " "cause this pain?\" (e.g., foods, stress, alcohol)      Food, activity  9. CARDIAC SYMPTOMS: \"Do you have any of the following symptoms: chest pain, difficulty breathing, sweating, nausea?\"      no  10. OTHER SYMPTOMS: \"Do you have any other symptoms?\" (e.g., back pain, diarrhea, fever, urination pain, vomiting)        Nothing improves symptoms. Eating food makes much worse. No other symptoms  11. PREGNANCY: \"Is there any chance you are pregnant?\" \"When was your last menstrual period?\"        no    Protocols used: Abdominal Pain - [place]    "

## 2025-01-14 NOTE — TELEPHONE ENCOUNTER
She is safe to take protonix 40 mg BID but yes, lets also have her schedule an appointment to assess for other etiologies since her workup so far has been stable. Same day ok in the next 2 weeks

## 2025-01-14 NOTE — TELEPHONE ENCOUNTER
Writer contacted patient and relayed provider message below~  Patient expressed understanding and agreed to schedule appointment.    Reference message:

## 2025-01-30 ENCOUNTER — OFFICE VISIT (OUTPATIENT)
Dept: FAMILY MEDICINE | Facility: CLINIC | Age: 70
End: 2025-01-30
Payer: MEDICARE

## 2025-01-30 VITALS — RESPIRATION RATE: 12 BRPM | OXYGEN SATURATION: 99 % | TEMPERATURE: 97 F | HEART RATE: 62 BPM

## 2025-01-30 DIAGNOSIS — F33.1 MODERATE EPISODE OF RECURRENT MAJOR DEPRESSIVE DISORDER (H): ICD-10-CM

## 2025-01-30 DIAGNOSIS — K21.00 GASTROESOPHAGEAL REFLUX DISEASE WITH ESOPHAGITIS, UNSPECIFIED WHETHER HEMORRHAGE: Primary | ICD-10-CM

## 2025-01-30 PROCEDURE — 99214 OFFICE O/P EST MOD 30 MIN: CPT | Performed by: PHYSICIAN ASSISTANT

## 2025-01-30 RX ORDER — PANTOPRAZOLE SODIUM 40 MG/1
40 TABLET, DELAYED RELEASE ORAL 2 TIMES DAILY
Qty: 180 TABLET | Refills: 1 | Status: SHIPPED | OUTPATIENT
Start: 2025-01-30

## 2025-01-30 RX ORDER — SUCRALFATE ORAL 1 G/10ML
1 SUSPENSION ORAL 4 TIMES DAILY
Qty: 473 ML | Refills: 1 | Status: SHIPPED | OUTPATIENT
Start: 2025-01-30

## 2025-01-30 NOTE — PROGRESS NOTES
"  Assessment & Plan     Gastroesophageal reflux disease with esophagitis, unspecified whether hemorrhage  Continue PPI and start carafate PRN  GERD diet discussed again.   Recommend that she see cardiology as planned and consider stress testing to r/o angina as a cause of her symptoms.   She has been taking the PPI BID x 2 weeks.  She will continue x 4-6 weeks.  If no further improvement, will consider imaging and follow up with GI  - sucralfate (CARAFATE) 1 GM/10ML suspension; Take 10 mLs (1 g) by mouth 4 times daily.  - pantoprazole (PROTONIX) 40 MG EC tablet; Take 1 tablet (40 mg) by mouth 2 times daily. 1 in morning , 1 in evening    Moderate episode of recurrent major depressive disorder (H)  stable          BMI  Estimated body mass index is 33.73 kg/m  as calculated from the following:    Height as of 8/26/24: 1.638 m (5' 4.5\").    Weight as of 8/26/24: 90.5 kg (199 lb 9.6 oz).         Follow up a above    Subjective   Yessy is a 69 year old, presenting for the following health issues:  Recheck Medication, Gastrophageal Reflux, and Medication Request (Sleep aid)        1/30/2025     2:20 PM   Additional Questions   Roomed by Angie SHEA     History of Present Illness       Reason for visit:  Gerd Medication check    She eats 4 or more servings of fruits and vegetables daily.She consumes 0 sweetened beverage(s) daily.She exercises with enough effort to increase her heart rate 20 to 29 minutes per day.  She exercises with enough effort to increase her heart rate 4 days per week.   She is taking medications regularly.     Pt is following up on pantoprozole medication. Somewhat seeing improvement and no side effects.       GERD/Heartburn  Onset/Duration: a while   Description: GERD , chest heaviness   Intensity: moderate  Progression of Symptoms: improving  Accompanying Signs & Symptoms:  Does it feel like food gets stuck or trouble swallowing: No  Nausea: No  Vomiting (bloody?): No  Abdominal Pain: No  [place]-T[place]ry " stools: No  Bloody stools: No  History:  Previous similar episodes: No  Previous ulcers: No  Precipitating factors:   Caffeine use: YES- scaling back  Alcohol use: No  NSAID/Aspirin use: N/A  Tobacco use: No  Worse with exercise .  Alleviating factors: None  Therapies tried and outcome:             Lifestyle changes: diet change            Medications: pantoprozole ws doubled. Helping a bit      Yessy has severe heartburn.  Recent EGD reflux esophagitis and a small non bleeding gastric ulcer, and schaski ring that was dilated.  She is using pantoprazole 40 BID.  This is helping somewhat but has only improved symptoms by about 50%.  Symptoms are positional and are made worse by eating and with exercise.  She is following with cardiology as well and has an appointment Monday.  Has not had stress testing.       Review of Systems  Constitutional, HEENT, cardiovascular, pulmonary, GI, , musculoskeletal, neuro, skin, endocrine and psych systems are negative, except as otherwise noted.      Objective    Pulse 62   Temp 97  F (36.1  C) (Temporal)   Resp 12   LMP 04/17/2007 (Approximate)   SpO2 99%   There is no height or weight on file to calculate BMI.  Physical Exam   GENERAL: alert and no distress  RESP: lungs clear to auscultation - no rales, rhonchi or wheezes  CV: regular rate and rhythm, normal S1 S2, no S3 or S4, no murmur, click or rub, no peripheral edema               Signed Electronically by: Benji Damon PA-C

## 2025-02-02 ENCOUNTER — MYC MEDICAL ADVICE (OUTPATIENT)
Dept: FAMILY MEDICINE | Facility: CLINIC | Age: 70
End: 2025-02-02
Payer: MEDICARE

## 2025-02-02 DIAGNOSIS — K21.00 GASTROESOPHAGEAL REFLUX DISEASE WITH ESOPHAGITIS, UNSPECIFIED WHETHER HEMORRHAGE: Primary | ICD-10-CM

## 2025-02-03 ENCOUNTER — OFFICE VISIT (OUTPATIENT)
Dept: CARDIOLOGY | Facility: CLINIC | Age: 70
End: 2025-02-03
Payer: MEDICARE

## 2025-02-03 VITALS
HEIGHT: 65 IN | BODY MASS INDEX: 34.99 KG/M2 | HEART RATE: 68 BPM | SYSTOLIC BLOOD PRESSURE: 159 MMHG | OXYGEN SATURATION: 100 % | WEIGHT: 210 LBS | DIASTOLIC BLOOD PRESSURE: 72 MMHG

## 2025-02-03 DIAGNOSIS — R01.1 UNDIAGNOSED CARDIAC MURMURS: ICD-10-CM

## 2025-02-03 DIAGNOSIS — I10 BENIGN ESSENTIAL HYPERTENSION: ICD-10-CM

## 2025-02-03 DIAGNOSIS — R07.2 PRECORDIAL PAIN: Primary | ICD-10-CM

## 2025-02-03 DIAGNOSIS — R03.0 ELEVATED BLOOD PRESSURE READING WITHOUT DIAGNOSIS OF HYPERTENSION: ICD-10-CM

## 2025-02-03 DIAGNOSIS — I35.1 AORTIC EJECTION MURMUR: ICD-10-CM

## 2025-02-03 DIAGNOSIS — I49.1 PREMATURE ATRIAL CONTRACTION: ICD-10-CM

## 2025-02-03 DIAGNOSIS — I49.9 IRREGULAR HEART BEAT: ICD-10-CM

## 2025-02-03 PROCEDURE — 99214 OFFICE O/P EST MOD 30 MIN: CPT | Performed by: NURSE PRACTITIONER

## 2025-02-03 RX ORDER — LISINOPRIL AND HYDROCHLOROTHIAZIDE 10; 12.5 MG/1; MG/1
1 TABLET ORAL DAILY
Qty: 90 TABLET | Refills: 3 | Status: SHIPPED | OUTPATIENT
Start: 2025-02-03

## 2025-02-03 NOTE — PROGRESS NOTES
Cardiology Clinic Progress Note  Yessy Kuhn MRN# [id number]   YOB: 1955 Age: 69 year old   Primary Cardiologist: Dr. Smith  Reason for visit: Follow up testing             Assessment and Plan:       1.  Exertional chest pain with recent diagnosis of GERD  -Underwent EGD showing erosive gastropathy as well as esophagitis, some symptom improvement following start of Protonix, responsive to Tums however follows a typical anginal pattern    2. Normotension with proven whitecoat hypertension  -9/2024 24-hour blood pressure monitor showing borderline hypotension with a daytime average of 106/56 and nighttime 84/44    3. Aortic sclerosis without stenosis   -9/2024 TTE showing aortic sclerosis with mild regurgitation    4. Frequent PACs  -Noted on previous EKG in August 2024  -Holter monitor without significant PAC burden    5.  Dyslipidemia, well-controlled on atorvastatin without evidence of hepatic toxicity    Yessy feels her reflux symptoms have improved about 50% since starting Protonix, however are mimicking in pattern to stable angina.  She has never undergone any stress testing and does have risk factors of dyslipidemia as well as hypertension.  We discussed having her undergo an exercise stress test to rule out ischemia and reassure her she can progress with her exercises as her GERD symptoms improved.  Her 24-hour blood pressure monitor verified she does have whitecoat hypertension.  In fact given the findings of relative hypotension we will reduce her lisinopril HCTZ and have her monitor her blood pressures at home.  Her lipid profile from November shows excellent control and I will have her continue her atorvastatin.  I will follow-up with her regarding the results of her stress test and have her follow-up with Dr. Leroy Lopez in 3 months.      Plan:  Continue PCP follow-up for GERD symptoms, continue Protonix and as needed Tums  Exercise stress echocardiogram ordered to rule out  ischemia  Reduce lisinopril-HCTZ to 10 mg - 12.5 mg daily and monitor home blood pressures  Continue atorvastatin 20 mg daily    Follow up: Follow-up with Dr. Leroy Lopez in 3 months        History of Presenting Illness:    Yessy Kuhn is a very pleasant 69 year old female with a history of hypertension, PACs, and cardiac murmur.    She was evaluated by Dr. Leroy Lopez in August 2024 for a longstanding murmur and was not experiencing any concerning cardiac symptoms.  He did note frequent PACs on EKG performed during that visit.  She subsequently wore a Holter monitor which showed an average heart rate of 57 with a minimum of 44 overnight and a maximum of 96, no significant ectopic burden or arrhythmias.  An echocardiogram was completed in September which showed normal left ventricular ejection fraction and systolic function with no regional wall motion abnormalities, trileaflet aortic valve with sclerosis, without stenosis, trace aortic and mitral regurgitation.  A 24-hour blood pressure monitor showed a daytime average of 106/56, nighttime 84/44 and overall average of 100/53.  Lab work was also done including a lipid profile which showed total cholesterol 45, HDL 73, LDL 62, triglycerides 51, BMP with sodium 137, potassium 4.0, BUN 30.3, creatinine 0.56, GFR rater than 90, a CBC with hemoglobin 12.5, platelets 198, a TSH was completed in August which was normal at 3.4 as well as magnesium which was 2.1.    Patient is here today for a follow up of her recent testing.  After her last visit with Dr. Leroy Lopez in the fall 2024 she began to develop reflux symptoms. She had an EGD which showed grade B esophagitis and erosive gastropathy. She has been diagnosed with GERD and has some relief with protonix.  She notes her symptoms occur at rest and with exertion.  They resolve if she stops for about 1 minute or takes Tums.  She typically does not get the symptoms if she exercises in the morning before eating or  drinking.  Her symptoms have been severe enough it has been limiting her exercise and she is frustrated by this.  She tells me her primary care provider has discussed stress testing with her.    Denies dizziness, lightheadedness or other presyncopal symptoms. Denies tachycardia or palpitations.  Denies shortness of breath, orthopnea, or PND.  She does have some mild lower extremity edema and wears compression stocking daily    Blood pressure 159/72 and HR 68 in clinic today.        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  Social History     Socioeconomic History    Marital status:      Spouse name: Sam    Number of children: 3    Years of education: 16    Highest education level: Not on file   Occupational History    Occupation: Day Care Provider   Tobacco Use    Smoking status: Never    Smokeless tobacco: Never   Vaping Use    Vaping status: Never Used   Substance and Sexual Activity    Alcohol use: No    Drug use: No     Comment: CBD gummies for sleep    Sexual activity: Never     Partners: Male   Other Topics Concern     Service No    Blood Transfusions No    Caffeine Concern Yes     Comment: 4-6 cups of coffee per day and 2 sodas per day    Occupational Exposure No    Hobby Hazards No    Sleep Concern Yes     Comment: difficulty getting to sleep    Stress Concern Yes     Comment: teenagers at home    Weight Concern Yes    Special Diet No    Back Care No    Exercise No    Bike Helmet No    Seat Belt Yes    Self-Exams Yes     Comment: occasional    Parent/sibling w/ CABG, MI or angioplasty before 65F 55M? No   Social History Narrative    Not on file     Social Drivers of Health     Financial Resource Strain: Low Risk  (1/16/2024)    Financial Resource Strain     Within the past 12 months, have you or your family members you live with been unable to get utilities (heat, electricity) when it was really needed?:  No   Food Insecurity: Low Risk  (1/16/2024)    Food Insecurity     Within the past 12 months, did you worry that your food would run out before you got money to buy more?: No     Within the past 12 months, did the food you bought just not last and you didn t have money to get more?: No   Transportation Needs: Low Risk  (1/16/2024)    Transportation Needs     Within the past 12 months, has lack of transportation kept you from medical appointments, getting your medicines, non-medical meetings or appointments, work, or from getting things that you need?: No   Physical Activity: Insufficiently Active (10/30/2024)    Exercise Vital Sign     Days of Exercise per Week: 3 days     Minutes of Exercise per Session: 40 min   Stress: No Stress Concern Present (10/30/2024)    [place] [place] [employer] - Occupational Stress Questionnaire     Feeling of Stress : Not at all   Social Connections: Unknown (10/30/2024)    Social Connection and Isolation Panel [NHANES]     Frequency of Communication with Friends and Family: Not on file     Frequency of Social Gatherings with Friends and Family: Twice a week     Attends Spiritism Services: Not on file     Active Member of Clubs or Organizations: Not on file     Attends Club or Organization Meetings: Not on file     Marital Status: Not on file   Interpersonal Safety: Low Risk  (10/30/2024)    Interpersonal Safety     Do you feel physically and emotionally safe where you currently live?: Yes     Within the past 12 months, have you been hit, slapped, kicked or otherwise physically hurt by someone?: No     Within the past 12 months, have you been humiliated or emotionally abused in other ways by your partner or ex-partner?: No   Housing Stability: Low Risk  (1/16/2024)    Housing Stability     Do you have housing? : Yes     Are you worried about losing your housing?: No            Review of Systems:   Skin:        Eyes:    glasses  ENT:       Respiratory:  Negative   "  Cardiovascular:  Negative, chest pain, palpitations, syncope or near-syncope, dizziness, lightheadedness fatigue, Positive for, edema  Gastroenterology: Positive for    Genitourinary:       Musculoskeletal:       Neurologic:       Psychiatric:  not assessed anxiety  Heme/Lymph/Imm:  Positive for allergies  Endocrine:  Negative           Physical Exam:   Vitals: BP (!) 159/72 (BP Location: Right arm, Patient Position: Sitting)   Pulse 68   Ht 1.638 m (5' 4.5\")   Wt 95.3 kg (210 lb)   LMP 04/17/2007 (Approximate)   SpO2 100%   BMI 35.49 kg/m     Wt Readings from Last 4 Encounters:   02/03/25 95.3 kg (210 lb)   08/26/24 90.5 kg (199 lb 9.6 oz)   01/23/24 86.7 kg (191 lb 3.2 oz)   06/19/23 79 kg (174 lb 3.2 oz)     GEN: well nourished, in no acute distress.  HEENT:  Pupils equal, round. Sclerae nonicteric.   NECK: Supple, no masses appreciated.   C/V:  Regular rate and rhythm, III/VI systolic murmur   RESP: Respirations are unlabored. Clear to auscultation bilaterally without wheezing, rales, or rhonchi.  GI: Abdomen soft, nontender.  EXTREM: Bilateral 1+ LE edema.  NEURO: Alert and oriented, cooperative.  SKIN: Warm and dry.        Data:     LIPID RESULTS:  Lab Results   Component Value Date    CHOL 145 11/27/2024    CHOL 158 04/18/2016    HDL 73 11/27/2024    HDL 48 (L) 04/18/2016    LDL 62 11/27/2024    LDL 96 04/18/2016    TRIG 51 11/27/2024    TRIG 68 04/18/2016    CHOLHDLRATIO 3.6 12/18/2006     LIVER ENZYME RESULTS:  Lab Results   Component Value Date    AST 34 06/19/2023    AST 17 04/18/2016    ALT 27 06/19/2023    ALT 24 04/18/2016     CBC RESULTS:  Lab Results   Component Value Date    WBC 3.6 (L) 11/27/2024    WBC 2.9 (L) 12/14/2020    RBC 3.75 (L) 11/27/2024    RBC 3.83 12/14/2020    HGB 12.5 11/27/2024    HGB 12.8 12/14/2020    HCT 38.0 11/27/2024    HCT 39.2 12/14/2020     (H) 11/27/2024     (H) 12/14/2020    MCH 33.3 (H) 11/27/2024    MCH 33.4 (H) 12/14/2020    MCHC 32.9 11/27/2024 " "   MCHC 32.7 12/14/2020    RDW 13.6 11/27/2024    RDW 12.3 12/14/2020     11/27/2024     12/14/2020     BMP RESULTS:  Lab Results   Component Value Date     11/27/2024     12/14/2020    POTASSIUM 4.0 11/27/2024    POTASSIUM 4.2 06/16/2022    POTASSIUM 4.8 12/14/2020    CHLORIDE 100 11/27/2024    CHLORIDE 96 06/16/2022    CHLORIDE 106 12/14/2020    CO2 29 11/27/2024    CO2 28 06/16/2022    CO2 26 12/14/2020    ANIONGAP 8 11/27/2024    ANIONGAP 6 06/16/2022    ANIONGAP 6 12/14/2020    GLC 81 11/27/2024    GLC 85 06/16/2022    GLC 87 12/14/2020    BUN 13.3 11/27/2024    BUN 16 06/16/2022    BUN 13 12/14/2020    CR 0.56 11/27/2024    CR 0.59 12/14/2020    GFRESTIMATED >90 11/27/2024    GFRESTIMATED >90 12/14/2020    GFRESTBLACK >90 12/14/2020    KRISTEN 9.8 11/27/2024    KRISTEN 9.5 12/14/2020      A1C RESULTS:  No results found for: \"A1C\"  INR RESULTS:  Lab Results   Component Value Date    INR 1.00 03/05/2019            Medications     Current Outpatient Medications   Medication Sig Dispense Refill    acetaminophen (TYLENOL) 325 MG tablet Take 650 mg by mouth 2 times daily as needed for mild pain      atorvastatin (LIPITOR) 20 MG tablet Take 1 tablet (20 mg) by mouth daily. 90 tablet 0    calcium carbonate 600 mg-vitamin D 400 units (CALTRATE) 600-400 MG-UNIT per tablet Take 1 tablet by mouth 2 times daily      cyanocobalamin (VITAMIN B-12) 500 MCG tablet Take 500 mcg by mouth daily      lisinopril-hydrochlorothiazide (ZESTORETIC) 10-12.5 MG tablet Take 1 tablet by mouth daily. 90 tablet 3    LORazepam (ATIVAN) 0.5 MG tablet Take 1 tablet (0.5 mg) by mouth every 6 hours as needed for anxiety. 20 tablet 0    magnesium 250 MG tablet Take 1 tablet by mouth daily.      melatonin 5 MG tablet Take 10 mg by mouth nightly as needed for sleep (2 x 5mg = 10mg)      Misc Natural Products (BEET ROOT PO) Take by mouth 2 times daily. OTC      Nutritional Supplements (SALMON OIL PO) Take by mouth daily. OTC      " pantoprazole (PROTONIX) 40 MG EC tablet Take 1 tablet (40 mg) by mouth 2 times daily. 1 in morning , 1 in evening 180 tablet 1    sucralfate (CARAFATE) 1 GM/10ML suspension Take 10 mLs (1 g) by mouth 4 times daily. (Patient not taking: Reported on 2/3/2025) 473 mL 1          Past Medical History     Past Medical History:   Diagnosis Date    Arthritis     Hypertension     Irritable bowel syndrome     constipation    Mitral valve prolapse     PONV (postoperative nausea and vomiting)     Presence of intrauterine contraceptive device 1997    removed 2014     Past Surgical History:   Procedure Laterality Date    ABDOMEN SURGERY  2/91    ARTHROPLASTY KNEE Right 03/11/2019    Procedure: RIGHT TOTAL KNEE ARTHROPLASTY;  Surgeon: Tate Cohn MD;  Location: [facility] OR    CHOLECYSTECTOMY, OPEN  01/01/1991    COLONOSCOPY  2014     Family History   Problem Relation Age of Onset    Respiratory Mother         emphysema - heavy smoker    Depression Mother     Substance Abuse Mother     Cancer Father         bladder and lung at age 50    Other Cancer Father         Heavy smoker; bladder, lung, pancreatic    Coronary Artery Disease Sister         Age 73, heavy smoker, poor health, copd    Cerebrovascular Disease Sister         TIA Jan 2023    Depression Sister     Substance Abuse Brother     Substance Abuse Maternal Grandfather     Diabetes Paternal Grandmother         adult onset    Arthritis Sister     Breast Cancer Cousin     Substance Abuse Brother             Allergies   Antihistamine [antihistamines, chlorpheniramine-type]; Diphenhydramine; and Latex        Sara Birmingham NP  [facility]

## 2025-02-03 NOTE — TELEPHONE ENCOUNTER
Benji,     Please see pt MyChart message and advise.   Any alternatives to sucralfate (CARAFATE) 1 GM/10ML suspension  you recommend?  Or suggest pt contact her insurance to find covered alternatives?    Thank you,  Abiola Still RN

## 2025-02-03 NOTE — LETTER
2/3/2025    Benji Damon PA-C  [address]  [place] [address]    RE: Yessy BAEZA Hattie       Dear Colleague,     I had the pleasure of seeing Yessy Kuhn in the [facility].    Cardiology Clinic Progress Note  Yessy Kuhn MRN# [id number]   YOB: 1955 Age: 69 year old   Primary Cardiologist: Dr. Smith  Reason for visit: Follow up testing             Assessment and Plan:       1.  Exertional chest pain with recent diagnosis of GERD  -Underwent EGD showing erosive gastropathy as well as esophagitis, some symptom improvement following start of Protonix, responsive to Tums however follows a typical anginal pattern    2. Normotension with proven whitecoat hypertension  -9/2024 24-hour blood pressure monitor showing borderline hypotension with a daytime average of 106/56 and nighttime 84/44    3. Aortic sclerosis without stenosis   -9/2024 TTE showing aortic sclerosis with mild regurgitation    4. Frequent PACs  -Noted on previous EKG in August 2024  -Holter monitor without significant PAC burden    5.  Dyslipidemia, well-controlled on atorvastatin without evidence of hepatic toxicity    Yessy feels her reflux symptoms have improved about 50% since starting Protonix, however are mimicking in pattern to stable angina.  She has never undergone any stress testing and does have risk factors of dyslipidemia as well as hypertension.  We discussed having her undergo an exercise stress test to rule out ischemia and reassure her she can progress with her exercises as her GERD symptoms improved.  Her 24-hour blood pressure monitor verified she does have whitecoat hypertension.  In fact given the findings of relative hypotension we will reduce her lisinopril HCTZ and have her monitor her blood pressures at home.  Her lipid profile from November shows excellent control and I will have her continue her atorvastatin.  I will follow-up with her regarding the results of her  stress test and have her follow-up with Dr. Leroy Lopez in 3 months.      Plan:  Continue PCP follow-up for GERD symptoms, continue Protonix and as needed Tums  Exercise stress echocardiogram ordered to rule out ischemia  Reduce lisinopril-HCTZ to 10 mg - 12.5 mg daily and monitor home blood pressures  Continue atorvastatin 20 mg daily    Follow up: Follow-up with Dr. Leroy Lopez in 3 months        History of Presenting Illness:    Yessy Kuhn is a very pleasant 69 year old female with a history of hypertension, PACs, and cardiac murmur.    She was evaluated by Dr. Leroy Lopez in August 2024 for a longstanding murmur and was not experiencing any concerning cardiac symptoms.  He did note frequent PACs on EKG performed during that visit.  She subsequently wore a Holter monitor which showed an average heart rate of 57 with a minimum of 44 overnight and a maximum of 96, no significant ectopic burden or arrhythmias.  An echocardiogram was completed in September which showed normal left ventricular ejection fraction and systolic function with no regional wall motion abnormalities, trileaflet aortic valve with sclerosis, without stenosis, trace aortic and mitral regurgitation.  A 24-hour blood pressure monitor showed a daytime average of 106/56, nighttime 84/44 and overall average of 100/53.  Lab work was also done including a lipid profile which showed total cholesterol 45, HDL 73, LDL 62, triglycerides 51, BMP with sodium 137, potassium 4.0, BUN 30.3, creatinine 0.56, GFR rater than 90, a CBC with hemoglobin 12.5, platelets 198, a TSH was completed in August which was normal at 3.4 as well as magnesium which was 2.1.    Patient is here today for a follow up of her recent testing.  After her last visit with Dr. Leroy Lopez in the fall 2024 she began to develop reflux symptoms. She had an EGD which showed grade B esophagitis and erosive gastropathy. She has been diagnosed with GERD and has some relief with protonix.   She notes her symptoms occur at rest and with exertion.  They resolve if she stops for about 1 minute or takes Tums.  She typically does not get the symptoms if she exercises in the morning before eating or drinking.  Her symptoms have been severe enough it has been limiting her exercise and she is frustrated by this.  She tells me her primary care provider has discussed stress testing with her.    Denies dizziness, lightheadedness or other presyncopal symptoms. Denies tachycardia or palpitations.  Denies shortness of breath, orthopnea, or PND.  She does have some mild lower extremity edema and wears compression stocking daily    Blood pressure 159/72 and HR 68 in clinic today.        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  Social History     Socioeconomic History     Marital status:      Spouse name: Sam     Number of children: 3     Years of education: 16     Highest education level: Not on file   Occupational History     Occupation: Day Care Provider   Tobacco Use     Smoking status: Never     Smokeless tobacco: Never   Vaping Use     Vaping status: Never Used   Substance and Sexual Activity     Alcohol use: No     Drug use: No     Comment: CBD gummies for sleep     Sexual activity: Never     Partners: Male   Other Topics Concern      Service No     Blood Transfusions No     Caffeine Concern Yes     Comment: 4-6 cups of coffee per day and 2 sodas per day     Occupational Exposure No     Hobby Hazards No     Sleep Concern Yes     Comment: difficulty getting to sleep     Stress Concern Yes     Comment: teenagers at home     Weight Concern Yes     Special Diet No     Back Care No     Exercise No     Bike Helmet No     Seat Belt Yes     Self-Exams Yes     Comment: occasional     Parent/sibling w/ CABG, MI or angioplasty before 65F 55M? No   Social History Narrative     Not on file     Social Drivers of Health      Financial Resource Strain: Low Risk  (1/16/2024)    Financial Resource Strain      Within the past 12 months, have you or your family members you live with been unable to get utilities (heat, electricity) when it was really needed?: No   Food Insecurity: Low Risk  (1/16/2024)    Food Insecurity      Within the past 12 months, did you worry that your food would run out before you got money to buy more?: No      Within the past 12 months, did the food you bought just not last and you didn t have money to get more?: No   Transportation Needs: Low Risk  (1/16/2024)    Transportation Needs      Within the past 12 months, has lack of transportation kept you from medical appointments, getting your medicines, non-medical meetings or appointments, work, or from getting things that you need?: No   Physical Activity: Insufficiently Active (10/30/2024)    Exercise Vital Sign      Days of Exercise per Week: 3 days      Minutes of Exercise per Session: 40 min   Stress: No Stress Concern Present (10/30/2024)    [employer] - Occupational Stress Questionnaire      Feeling of Stress : Not at all   Social Connections: Unknown (10/30/2024)    Social Connection and Isolation Panel [NHANES]      Frequency of Communication with Friends and Family: Not on file      Frequency of Social Gatherings with Friends and Family: Twice a week      Attends Orthodox Services: Not on file      Active Member of Clubs or Organizations: Not on file      Attends Club or Organization Meetings: Not on file      Marital Status: Not on file   Interpersonal Safety: Low Risk  (10/30/2024)    Interpersonal Safety      Do you feel physically and emotionally safe where you currently live?: Yes      Within the past 12 months, have you been hit, slapped, kicked or otherwise physically hurt by someone?: No      Within the past 12 months, have you been humiliated or emotionally abused in other ways by your partner or ex-partner?: No  "  Housing Stability: Low Risk  (1/16/2024)    Housing Stability      Do you have housing? : Yes      Are you worried about losing your housing?: No            Review of Systems:   Skin:        Eyes:    glasses  ENT:       Respiratory:  Negative    Cardiovascular:  Negative, chest pain, palpitations, syncope or near-syncope, dizziness, lightheadedness fatigue, Positive for, edema  Gastroenterology: Positive for    Genitourinary:       Musculoskeletal:       Neurologic:       Psychiatric:  not assessed anxiety  Heme/Lymph/Imm:  Positive for allergies  Endocrine:  Negative           Physical Exam:   Vitals: BP (!) 159/72 (BP Location: Right arm, Patient Position: Sitting)   Pulse 68   Ht 1.638 m (5' 4.5\")   Wt 95.3 kg (210 lb)   LMP 04/17/2007 (Approximate)   SpO2 100%   BMI 35.49 kg/m     Wt Readings from Last 4 Encounters:   02/03/25 95.3 kg (210 lb)   08/26/24 90.5 kg (199 lb 9.6 oz)   01/23/24 86.7 kg (191 lb 3.2 oz)   06/19/23 79 kg (174 lb 3.2 oz)     GEN: well nourished, in no acute distress.  HEENT:  Pupils equal, round. Sclerae nonicteric.   NECK: Supple, no masses appreciated.   C/V:  Regular rate and rhythm, no murmur, rub or gallop.    RESP: Respirations are unlabored. Clear to auscultation bilaterally without wheezing, rales, or rhonchi.  GI: Abdomen soft, nontender.  EXTREM: Bilateral 1+ LE edema.  NEURO: Alert and oriented, cooperative.  SKIN: Warm and dry.        Data:     LIPID RESULTS:  Lab Results   Component Value Date    CHOL 145 11/27/2024    CHOL 158 04/18/2016    HDL 73 11/27/2024    HDL 48 (L) 04/18/2016    LDL 62 11/27/2024    LDL 96 04/18/2016    TRIG 51 11/27/2024    TRIG 68 04/18/2016    CHOLHDLRATIO 3.6 12/18/2006     LIVER ENZYME RESULTS:  Lab Results   Component Value Date    AST 34 06/19/2023    AST 17 04/18/2016    ALT 27 06/19/2023    ALT 24 04/18/2016     CBC RESULTS:  Lab Results   Component Value Date    WBC 3.6 (L) 11/27/2024    WBC 2.9 (L) 12/14/2020    RBC 3.75 (L) " "11/27/2024    RBC 3.83 12/14/2020    HGB 12.5 11/27/2024    HGB 12.8 12/14/2020    HCT 38.0 11/27/2024    HCT 39.2 12/14/2020     (H) 11/27/2024     (H) 12/14/2020    MCH 33.3 (H) 11/27/2024    MCH 33.4 (H) 12/14/2020    MCHC 32.9 11/27/2024    MCHC 32.7 12/14/2020    RDW 13.6 11/27/2024    RDW 12.3 12/14/2020     11/27/2024     12/14/2020     BMP RESULTS:  Lab Results   Component Value Date     11/27/2024     12/14/2020    POTASSIUM 4.0 11/27/2024    POTASSIUM 4.2 06/16/2022    POTASSIUM 4.8 12/14/2020    CHLORIDE 100 11/27/2024    CHLORIDE 96 06/16/2022    CHLORIDE 106 12/14/2020    CO2 29 11/27/2024    CO2 28 06/16/2022    CO2 26 12/14/2020    ANIONGAP 8 11/27/2024    ANIONGAP 6 06/16/2022    ANIONGAP 6 12/14/2020    GLC 81 11/27/2024    GLC 85 06/16/2022    GLC 87 12/14/2020    BUN 13.3 11/27/2024    BUN 16 06/16/2022    BUN 13 12/14/2020    CR 0.56 11/27/2024    CR 0.59 12/14/2020    GFRESTIMATED >90 11/27/2024    GFRESTIMATED >90 12/14/2020    GFRESTBLACK >90 12/14/2020    KRISTEN 9.8 11/27/2024    KRISTEN 9.5 12/14/2020      A1C RESULTS:  No results found for: \"A1C\"  INR RESULTS:  Lab Results   Component Value Date    INR 1.00 03/05/2019            Medications     Current Outpatient Medications   Medication Sig Dispense Refill     acetaminophen (TYLENOL) 325 MG tablet Take 650 mg by mouth 2 times daily as needed for mild pain       atorvastatin (LIPITOR) 20 MG tablet Take 1 tablet (20 mg) by mouth daily. 90 tablet 0     calcium carbonate 600 mg-vitamin D 400 units (CALTRATE) 600-400 MG-UNIT per tablet Take 1 tablet by mouth 2 times daily       cyanocobalamin (VITAMIN B-12) 500 MCG tablet Take 500 mcg by mouth daily       lisinopril-hydrochlorothiazide (ZESTORETIC) 10-12.5 MG tablet Take 1 tablet by mouth daily. 90 tablet 3     LORazepam (ATIVAN) 0.5 MG tablet Take 1 tablet (0.5 mg) by mouth every 6 hours as needed for anxiety. 20 tablet 0     magnesium 250 MG tablet Take 1 " tablet by mouth daily.       melatonin 5 MG tablet Take 10 mg by mouth nightly as needed for sleep (2 x 5mg = 10mg)       Misc Natural Products (BEET ROOT PO) Take by mouth 2 times daily. OTC       Nutritional Supplements (SALMON OIL PO) Take by mouth daily. OTC       pantoprazole (PROTONIX) 40 MG EC tablet Take 1 tablet (40 mg) by mouth 2 times daily. 1 in morning , 1 in evening 180 tablet 1     sucralfate (CARAFATE) 1 GM/10ML suspension Take 10 mLs (1 g) by mouth 4 times daily. (Patient not taking: Reported on 2/3/2025) 473 mL 1          Past Medical History     Past Medical History:   Diagnosis Date     Arthritis      Hypertension      Irritable bowel syndrome     constipation     Mitral valve prolapse      PONV (postoperative nausea and vomiting)      Presence of intrauterine contraceptive device 1997    removed 2014     Past Surgical History:   Procedure Laterality Date     ABDOMEN SURGERY  2/91     ARTHROPLASTY KNEE Right 03/11/2019    Procedure: RIGHT TOTAL KNEE ARTHROPLASTY;  Surgeon: Tate Cohn MD;  Location: [facility] OR     CHOLECYSTECTOMY, OPEN  01/01/1991     COLONOSCOPY  2014     Family History   Problem Relation Age of Onset     Respiratory Mother         emphysema - heavy smoker     Depression Mother      Substance Abuse Mother      Cancer Father         bladder and lung at age 50     Other Cancer Father         Heavy smoker; bladder, lung, pancreatic     Coronary Artery Disease Sister         Age 73, heavy smoker, poor health, copd     Cerebrovascular Disease Sister         TIA Jan 2023     Depression Sister      Substance Abuse Brother      Substance Abuse Maternal Grandfather      Diabetes Paternal Grandmother         adult onset     Arthritis Sister      Breast Cancer Cousin      Substance Abuse Brother             Allergies   Antihistamine [antihistamines, chlorpheniramine-type]; Diphenhydramine; and Latex        Sara Birmingham NP  [facility]       Thank  you for allowing me to participate in the care of your patient.      Sincerely,     Sara Birmingham NP     [facility]  cc:   Paramjit Smith MD  [address]  [place] [address]

## 2025-02-04 RX ORDER — SUCRALFATE 1 G/1
1 TABLET ORAL 4 TIMES DAILY
Qty: 120 TABLET | Refills: 1 | Status: SHIPPED | OUTPATIENT
Start: 2025-02-04

## 2025-02-06 ENCOUNTER — TELEPHONE (OUTPATIENT)
Dept: FAMILY MEDICINE | Facility: CLINIC | Age: 70
End: 2025-02-06
Payer: MEDICARE

## 2025-02-06 DIAGNOSIS — E78.5 HYPERLIPIDEMIA LDL GOAL <100: ICD-10-CM

## 2025-02-06 RX ORDER — ATORVASTATIN CALCIUM 20 MG/1
20 TABLET, FILM COATED ORAL DAILY
Qty: 90 TABLET | Refills: 2 | Status: SHIPPED | OUTPATIENT
Start: 2025-02-06

## 2025-02-06 NOTE — TELEPHONE ENCOUNTER
PA started for . sucralfate (CARAFATE) 1 GM/10ML suspensio  Log into [email or web address] and enter info from below:    Key: [id number]  Patient last name: Hattie  : 1955

## 2025-02-11 NOTE — TELEPHONE ENCOUNTER
PA Initiation    Medication: SUCRALFATE 1 G PO TABS  Insurance Company: [employer] - Phone [phone] Fax [phone]  Pharmacy Filling the Rx: [id number] - [place] - [address] AT [facility] [address]  Filling Pharmacy Phone: [phone]  Filling Pharmacy Fax: [phone]  Start Date: 2/11/2025

## 2025-02-12 NOTE — TELEPHONE ENCOUNTER
Prior Authorization Approval    Medication: SUCRALFATE 1 GM/10ML PO SUSP  Authorization Effective Date: 1/30/2025  Authorization Expiration Date:    Approved Dose/Quantity: as written  Reference #: [id number]   Insurance Company: [employer] - Phone [phone] Fax [phone]  Which Pharmacy is filling the prescription: [id number] - [place], [place] - [address] AT [facility] [address]  Pharmacy Notified: y  Patient Notified: Instructed pharmacy to notify patient once order is ready.

## 2025-02-27 ENCOUNTER — MYC MEDICAL ADVICE (OUTPATIENT)
Dept: CARDIOLOGY | Facility: CLINIC | Age: 70
End: 2025-02-27
Payer: MEDICARE

## 2025-02-27 ENCOUNTER — TRANSFERRED RECORDS (OUTPATIENT)
Dept: HEALTH INFORMATION MANAGEMENT | Facility: CLINIC | Age: 70
End: 2025-02-27
Payer: MEDICARE

## 2025-03-01 ENCOUNTER — MYC MEDICAL ADVICE (OUTPATIENT)
Dept: ONCOLOGY | Facility: CLINIC | Age: 70
End: 2025-03-01
Payer: MEDICARE

## 2025-03-03 ENCOUNTER — CARE COORDINATION (OUTPATIENT)
Dept: CARDIOLOGY | Facility: CLINIC | Age: 70
End: 2025-03-03

## 2025-03-03 ENCOUNTER — HOSPITAL ENCOUNTER (OUTPATIENT)
Dept: CARDIOLOGY | Facility: CLINIC | Age: 70
Discharge: HOME | End: 2025-03-03
Attending: NURSE PRACTITIONER | Admitting: NURSE PRACTITIONER
Payer: MEDICARE

## 2025-03-03 DIAGNOSIS — R07.9 CHEST PAIN: Primary | ICD-10-CM

## 2025-03-03 DIAGNOSIS — I25.10 CAD (CORONARY ARTERY DISEASE): ICD-10-CM

## 2025-03-03 DIAGNOSIS — I10 BENIGN ESSENTIAL HYPERTENSION: ICD-10-CM

## 2025-03-03 DIAGNOSIS — R07.2 PRECORDIAL PAIN: ICD-10-CM

## 2025-03-03 PROCEDURE — 93350 STRESS TTE ONLY: CPT | Mod: 26 | Performed by: INTERNAL MEDICINE

## 2025-03-03 PROCEDURE — 93016 CV STRESS TEST SUPVJ ONLY: CPT | Performed by: INTERNAL MEDICINE

## 2025-03-03 PROCEDURE — 93325 DOPPLER ECHO COLOR FLOW MAPG: CPT | Mod: 26 | Performed by: INTERNAL MEDICINE

## 2025-03-03 PROCEDURE — 255N000002 HC RX 255 OP 636: Performed by: NURSE PRACTITIONER

## 2025-03-03 PROCEDURE — C8928 TTE W OR W/O FOL W/CON,STRES: HCPCS

## 2025-03-03 PROCEDURE — 93325 DOPPLER ECHO COLOR FLOW MAPG: CPT | Mod: TC

## 2025-03-03 PROCEDURE — 93018 CV STRESS TEST I&R ONLY: CPT | Performed by: INTERNAL MEDICINE

## 2025-03-03 PROCEDURE — 93321 DOPPLER ECHO F-UP/LMTD STD: CPT | Mod: 26 | Performed by: INTERNAL MEDICINE

## 2025-03-03 RX ORDER — NITROGLYCERIN 0.4 MG/1
TABLET SUBLINGUAL
Qty: 25 TABLET | Refills: 3 | Status: SHIPPED | OUTPATIENT
Start: 2025-03-03

## 2025-03-03 RX ADMIN — HUMAN ALBUMIN MICROSPHERES AND PERFLUTREN 9 ML: 10; .22 INJECTION, SOLUTION INTRAVENOUS at 10:07

## 2025-03-03 NOTE — PROGRESS NOTES
RN called pt w/recommendations below. Rx for NTG proper use reviewed and sent to pt's pharmacy. Pt will call scheduling to set up first available BRISEIDA appt to discuss angiogram. She was advised not to exercise or shovel snow etc for the time being. Pt verbalized understanding of everything discussed. Terri Shelby RN on 3/3/2025 at 2:54 PM      ----- Message -----  From: Sara Birmingham NP  Sent: 3/3/2025   1:13 PM CST  To: [facility] Team 7    Can we please contact her and let her know that her stress test showed concerns for CAD. She is already on a statin with good LDL control. I will contact Dr. Frederick's office about starting her on aspirin. Please discuss appropriate use and send a supply of sublingual nitroglycerin for chest burning symptoms/chest pain.     I would like her to see an BRISEIDA to discuss angiogram within the next week and get it scheduled for her in the next two weeks. I would like her to avoid exercising at this time. Thanks. Dalia

## 2025-03-05 ENCOUNTER — OFFICE VISIT (OUTPATIENT)
Dept: CARDIOLOGY | Facility: CLINIC | Age: 70
End: 2025-03-05
Attending: NURSE PRACTITIONER
Payer: MEDICARE

## 2025-03-05 VITALS
DIASTOLIC BLOOD PRESSURE: 82 MMHG | SYSTOLIC BLOOD PRESSURE: 184 MMHG | BODY MASS INDEX: 35.49 KG/M2 | HEIGHT: 65 IN | HEART RATE: 78 BPM | OXYGEN SATURATION: 99 %

## 2025-03-05 DIAGNOSIS — I10 BENIGN ESSENTIAL HYPERTENSION: ICD-10-CM

## 2025-03-05 DIAGNOSIS — I25.728 CORONARY ARTERY DISEASE OF AUTOLOGOUS BYPASS GRAFT WITH STABLE ANGINA PECTORIS: ICD-10-CM

## 2025-03-05 DIAGNOSIS — R94.39 ABNORMAL STRESS TEST: Primary | ICD-10-CM

## 2025-03-05 PROCEDURE — 3077F SYST BP >= 140 MM HG: CPT | Performed by: PHYSICIAN ASSISTANT

## 2025-03-05 PROCEDURE — 3079F DIAST BP 80-89 MM HG: CPT | Performed by: PHYSICIAN ASSISTANT

## 2025-03-05 PROCEDURE — 99215 OFFICE O/P EST HI 40 MIN: CPT | Performed by: PHYSICIAN ASSISTANT

## 2025-03-05 RX ORDER — ASPIRIN 81 MG/1
81 TABLET, CHEWABLE ORAL DAILY
COMMUNITY
Start: 2025-03-05

## 2025-03-05 NOTE — PROGRESS NOTES
"  Cardiology Clinic Progress Note    Service Date: 2025    Primary Cardiologist: Dr. Leroy Lopez      Reason for Visit: abnormal stress test     HPI:   Yessy Kuhn is a delightful 69-year-old woman with PMH significant for the followin.  Aortic sclerosis without stenosis and trace AR.  2.  Mild MR  3.  Whitecoat hypertension proven with 24-hour monitor.  4.  PACs  5.  Dyslipidemia  6.  GERD with esophagitis on upper GI study     Yessy had been struggling with episodes of chest discomfort with exercising so much so that they have limited her exercise ability.  Given that, she underwent a stress echocardiogram during which she exercised 4 minutes and 45 seconds achieved 7 METS and 98% of the max predicted heart rate but her EF dropped from 60% at rest to for 40% poststress.  She had basal and anterior anterolateral inferolateral anterior, apex and mid inferior and inferolateral septal wall motion abnormalities.  She also had positive EKG changes for ischemia with ST depression in V4 through V6.    Today she notes that she has had this chest pressure and discomfort for over a year.  It occurs with exertion and relieved with rest and Tums.  She notes that when she was traveling with her daughter recently she could not walk more than 1-2 blocks without getting chest discomfort this feels like a tightness or burning in her upper chest and radiates into her throat.  She has not yet tried a nitro and this was prescribed over the phone recently.  She denies orthopnea or PND syncope or presyncope.    Social History:  She is , her  also has had a heart surgery with for valve repair.  She is a lifelong non-smoker.    Physical Exam:  BP (!) 184/82   Pulse 78   Ht 1.638 m (5' 4.5\")   LMP 2007 (Approximate)   SpO2 99%   BMI 35.49 kg/m     Well-developed well-nourished woman in no acute distress.  Normocephalic/atraumatic.  Heart is regular rate and rhythm I do not appreciate murmur " rub or gallop.  Lungs are clear without wheezes rales or rhonchi extremities without peripheral edema 2+ radial ulnar and femoral pulses bilaterally all without bruit.    Data reviewed:  Stress echocardiogram, lipid panel    Assessment and Plan:  1.  Chest pain that has been previously felt to be esophagitis related but is very typical for angina as it occurs with exertion and is relieved with rest.  In addition she has a markedly abnormal stress test with worsening of her EF with exertion as well as EKG changes and wall motion abnormalities.  We agreed today that angiogram is the next best course of action.  This was briefly reviewed with Dr. Gandhi and Dr. Leroy Lopez's absence.  Risks and benefits of coronary angiogram discussed today including, bleeding, bruising, infection, allergic reaction, kidney damage (including need for dialysis), stroke, heart attack, vascular damage, emergency open heart surgery, up to and including death.  Patient indicates understanding and is agreeable to proceed.      We reviewed that she will need aspirin and Plavix postoperatively and this may cause some strain on her stomach but with such a significantly abnormal stress test we need to prioritize her cardiac and work with GI to keep her on appropriate medications.  Will start her on 81 mg aspirin today.    2.  Dyslipidemia treated with atorvastatin well-controlled.    3.  Aortic sclerosis without stenosis.    4.  Known whitecoat hypertension normotensive at home.  In fact, she is sometimes hypotensive at home with a daytime average of 106/56 on 24-hour monitor 9/24.    Angiogram Checklist:  Contrast allergy: no  Anticoagulation: no   Metformin: no  SGLT2I: no  Insulin: no  Diuretic: no  Use of phosphodiesterase type 5 inhibitor: no  Aspirin: started today.     Pt informed to be NPO at midnight  Renal issues no  Pt has transportation and 24 hours post procedure monitoring set up.   Pt aware of no driving for 24 hours post  procedure.       Thank you for allowing me to participate in this delightful patient's care.  She will follow-up 1 week post angiogram.    Sushila Lyn PA-C  [facility]     This note was written using Dragon voice recognition system, please excuse any misspelled names, or nonsensical words and call with any questions.      40 total minutes was spent today including chart review, precharting, history and exam, post visit documentation, and reviewing studies as outlined above.   Orders this visit:  Orders Placed This Encounter   Procedures    Case Request Cath Lab: Coronary Angiogram, Left Heart Catheterization, Percutaneous Coronary Intervention     Orders Placed This Encounter   Medications    aspirin (ASA) 81 MG chewable tablet     Sig: Take 1 tablet (81 mg) by mouth daily.     Medications Discontinued During This Encounter   Medication Reason    calcium carbonate 600 mg-vitamin D 400 units (CALTRATE) 600-400 MG-UNIT per tablet     magnesium 250 MG tablet     cyanocobalamin (VITAMIN B-12) 500 MCG tablet     Misc Natural Products (BEET ROOT PO)     Nutritional Supplements (SALMON OIL PO)     sucralfate (CARAFATE) 1 GM/10ML suspension      Encounter Diagnoses   Name Primary?    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      atorvastatin (LIPITOR) 20 MG tablet TAKE 1 TABLET(20 MG) BY MOUTH DAILY 90 tablet 2    lisinopril-hydrochlorothiazide (ZESTORETIC) 10-12.5 MG tablet Take 1 tablet by mouth daily. 90 tablet 3    LORazepam (ATIVAN) 0.5 MG tablet Take 1 tablet (0.5 mg) by mouth every 6  hours as needed for anxiety. 20 tablet 0    melatonin 5 MG tablet Take 10 mg by mouth nightly as needed for sleep (2 x 5mg = 10mg)      nitroGLYcerin (NITROSTAT) 0.4 MG sublingual tablet For chest pain place 1 tablet under the tongue. If symptoms persist repeat every 5 minutes for up to 3 doses. If symptoms persist 5 minutes after 1st dose also call 911. 25 tablet 3    pantoprazole (PROTONIX) 40 MG EC tablet Take 1 tablet (40 mg) by mouth 2 times daily. 1 in morning , 1 in evening 180 tablet 1    sucralfate (CARAFATE) 1 GM tablet Take 1 tablet (1 g) by mouth 4 times daily. 120 tablet 1       Allergies Reviewed and Updated:  Allergies   Allergen Reactions    Antihistamine [Antihistamines, Chlorpheniramine-Type]      Emotional reactions    Diphenhydramine Unknown     Notes: ANXIETY;    Latex Unknown       Review of Systems:  Skin:        Eyes:       ENT:       Respiratory:  Positive for dyspnea on exertion  Cardiovascular:  Negative    Gastroenterology:      Genitourinary:       Musculoskeletal:       Neurologic:       Psychiatric:       Heme/Lymph/Imm:       Endocrine:  Negative       Pertinent Past Medical, Surgical and Family History Reviewed and noted above.     CC  Sara Birmingham, MICHI  [address] [place] [address]

## 2025-03-05 NOTE — LETTER
3/5/2025    Benji Damon PA-C  [address]  [place] [address]    RE: Yessy Kuhn       Dear Colleague,     I had the pleasure of seeing Yessy Kuhn in the [facility].    Cardiology Clinic Progress Note    Service Date: 2025    Primary Cardiologist: Dr. Leroy Lopez      Reason for Visit: abnormal stress test     HPI:   Yessy Kuhn is a delightful 69-year-old woman with PMH significant for the followin.  Aortic sclerosis without stenosis and trace AR.  2.  Mild MR  3.  Whitecoat hypertension proven with 24-hour monitor.  4.  PACs  5.  Dyslipidemia  6.  GERD with esophagitis on upper GI study     Yessy had been struggling with episodes of chest discomfort with exercising so much so that they have limited her exercise ability.  Given that, she underwent a stress echocardiogram during which she exercised 4 minutes and 45 seconds achieved 7 METS and 98% of the max predicted heart rate but her EF dropped from 60% at rest to for 40% poststress.  She had basal and anterior anterolateral inferolateral anterior, apex and mid inferior and inferolateral septal wall motion abnormalities.  She also had positive EKG changes for ischemia with ST depression in V4 through V6.    Today she notes that she has had this chest pressure and discomfort for over a year.  It occurs with exertion and relieved with rest and Tums.  She notes that when she was traveling with her daughter recently she could not walk more than 1-2 blocks without getting chest discomfort this feels like a tightness or burning in her upper chest and radiates into her throat.  She has not yet tried a nitro and this was prescribed over the phone recently.  She denies orthopnea or PND syncope or presyncope.    Social History:  She is , her  also has had a heart surgery with for valve repair.  She is a lifelong non-smoker.    Physical Exam:  BP (!) 184/82   Pulse 78   Ht 1.638 m (5'  "4.5\")   LMP 04/17/2007 (Approximate)   SpO2 99%   BMI 35.49 kg/m     Well-developed well-nourished woman in no acute distress.  Normocephalic/atraumatic.  Heart is regular rate and rhythm I do not appreciate murmur rub or gallop.  Lungs are clear without wheezes rales or rhonchi extremities without peripheral edema 2+ radial ulnar and femoral pulses bilaterally all without bruit.    Data reviewed:  Stress echocardiogram, lipid panel    Assessment and Plan:  1.  Chest pain that has been previously felt to be esophagitis related but is very typical for angina as it occurs with exertion and is relieved with rest.  In addition she has a markedly abnormal stress test with worsening of her EF with exertion as well as EKG changes and wall motion abnormalities.  We agreed today that angiogram is the next best course of action.  This was briefly reviewed with Dr. Gandhi and Dr. Leroy Lopez's absence.  Risks and benefits of coronary angiogram discussed today including, bleeding, bruising, infection, allergic reaction, kidney damage (including need for dialysis), stroke, heart attack, vascular damage, emergency open heart surgery, up to and including death.  Patient indicates understanding and is agreeable to proceed.      We reviewed that she will need aspirin and Plavix postoperatively and this may cause some strain on her stomach but with such a significantly abnormal stress test we need to prioritize her cardiac and work with GI to keep her on appropriate medications.  Will start her on 81 mg aspirin today.    2.  Dyslipidemia treated with atorvastatin well-controlled.    3.  Aortic sclerosis without stenosis.    4.  Known whitecoat hypertension normotensive at home.  In fact, she is sometimes hypotensive at home with a daytime average of 106/56 on 24-hour monitor 9/24.    Angiogram Checklist:  Contrast allergy: no  Anticoagulation: no   Metformin: no  SGLT2I: no  Insulin: no  Diuretic: no  Use of phosphodiesterase " type 5 inhibitor: no  Aspirin: started today.     Pt informed to be NPO at midnight  Renal issues no  Pt has transportation and 24 hours post procedure monitoring set up.   Pt aware of no driving for 24 hours post procedure.       Thank you for allowing me to participate in this delightful patient's care.  She will follow-up 1 week post angiogram.    Sushila Lyn PA-C  [facility]     This note was written using Dragon voice recognition system, please excuse any misspelled names, or nonsensical words and call with any questions.      40 total minutes was spent today including chart review, precharting, history and exam, post visit documentation, and reviewing studies as outlined above.   Orders this visit:  Orders Placed This Encounter   Procedures     Case Request Cath Lab: Coronary Angiogram, Left Heart Catheterization, Percutaneous Coronary Intervention     Orders Placed This Encounter   Medications     aspirin (ASA) 81 MG chewable tablet     Sig: Take 1 tablet (81 mg) by mouth daily.     Medications Discontinued During This Encounter   Medication Reason     calcium carbonate 600 mg-vitamin D 400 units (CALTRATE) 600-400 MG-UNIT per tablet      magnesium 250 MG tablet      cyanocobalamin (VITAMIN B-12) 500 MCG tablet      Misc Natural Products (BEET ROOT PO)      Nutritional Supplements (SALMON OIL PO)      sucralfate (CARAFATE) 1 GM/10ML suspension      Encounter Diagnoses   Name Primary?     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       atorvastatin (LIPITOR) 20 MG tablet TAKE  1 TABLET(20 MG) BY MOUTH DAILY 90 tablet 2     lisinopril-hydrochlorothiazide (ZESTORETIC) 10-12.5 MG tablet Take 1 tablet by mouth daily. 90 tablet 3     LORazepam (ATIVAN) 0.5 MG tablet Take 1 tablet (0.5 mg) by mouth every 6 hours as needed for anxiety. 20 tablet 0     melatonin 5 MG tablet Take 10 mg by mouth nightly as needed for sleep (2 x 5mg = 10mg)       nitroGLYcerin (NITROSTAT) 0.4 MG sublingual tablet For chest pain place 1 tablet under the tongue. If symptoms persist repeat every 5 minutes for up to 3 doses. If symptoms persist 5 minutes after 1st dose also call 911. 25 tablet 3     pantoprazole (PROTONIX) 40 MG EC tablet Take 1 tablet (40 mg) by mouth 2 times daily. 1 in morning , 1 in evening 180 tablet 1     sucralfate (CARAFATE) 1 GM tablet Take 1 tablet (1 g) by mouth 4 times daily. 120 tablet 1       Allergies Reviewed and Updated:  Allergies   Allergen Reactions     Antihistamine [Antihistamines, Chlorpheniramine-Type]      Emotional reactions     Diphenhydramine Unknown     Notes: ANXIETY;     Latex Unknown       Review of Systems:  Skin:        Eyes:       ENT:       Respiratory:  Positive for dyspnea on exertion  Cardiovascular:  Negative    Gastroenterology:      Genitourinary:       Musculoskeletal:       Neurologic:       Psychiatric:       Heme/Lymph/Imm:       Endocrine:  Negative       Pertinent Past Medical, Surgical and Family History Reviewed and noted above.     CC  Sara Birmingham NP  [address] [address]      Thank you for allowing me to participate in the care of your patient.      Sincerely,     Sushila Lyn PA-C     [facility]  cc:   Sara Birmingham NP  [address] [address]

## 2025-03-05 NOTE — PATIENT INSTRUCTIONS
Thank you for visiting with me today.    We discussed:   Ok to try a nitroglycerin next time you have discomfort.  Just make sure you're sitting down.      Medication changes:   Start taking aspirin 81 mg once a day.      Next Steps:   We'll schedule the angiogram and a follow up visit after that.      Please call my nurses, Lolita Angeles Stacy, or Melissa at [phone] with questions.      *If you have concerns after hours, please call [phone], option 2 to speak with on call Cardiologist.

## 2025-03-06 ENCOUNTER — TELEPHONE (OUTPATIENT)
Dept: CARDIOLOGY | Facility: CLINIC | Age: 70
End: 2025-03-06
Payer: MEDICARE

## 2025-03-06 NOTE — TELEPHONE ENCOUNTER
Coronary angiogram/PCI/Right Heart Cath prep instructions.     Patient is scheduled for a Coronary Angio w/possible PCI at [facility] - [address], [place] [address] - Main Entrance of the Hospital on 3/13/25.  Check in time is at 9:30 AM and procedure to follow.    Patient instructed to remain NPO for solid foods 8 hours prior to arrival and may have clear liquids up to 2 hours prior to arrival.    Patient does not require extra fluids prior to procedure.    Patient is not diabetic.    Patient is not on anticoagulation.    Patient advised to hold lisinopril-hydrochlorothiazide the morning of the procedure    Patient is taking ASA 81mg daily and will take 4 tabs (324mg) the morning of the procedure.    Pt is not on a SGLT2 inhibitor.    Pt is not on a GLP-1 Agonist    Patient advised to take their other daily medications the morning of the procedure with small sips of water.     Patient advised to shower the night before and morning of their procedure with regular soap.    Verified patient does not have a contrast allergy.    Verified patient has someone available to drive them home from the hospital and can stay with them for 24 hours after the procedure.     Patient advised they will have bedrest post procedure.  Length of time is 2-6 hours and dependent on access site used for procedure.  This bedrest is to allow proper clotting of the access site to prevent bleeding.    Patient advised to notify care team with any new COVID like symptoms prior to procedure. Day of procedure phone number: Sherry at [phone]    Patient is aware of visitor policy.    Patient expresses understanding of above instructions and denies further questions at this time.      Amita Tomlin RN  [facility]

## 2025-03-06 NOTE — TELEPHONE ENCOUNTER
----- Message from Sara Birmingham sent at 3/6/2025 10:29 AM CST -----  Regarding: FW: Possible DAPT  FYI. Should be good.  ----- Message -----  From: Mio Frederick MD  Sent: 3/6/2025   9:16 AM CST  To: Sara Birmingham NP  Subject: RE: Possible DAPT                                Hi Dalia  She is OK to proceed from GI stand point.  Her esophagitis was pretty mild      Thanks    Mio Frederick MD  ----- Message -----  From: Sara Birmingham NP  Sent: 3/3/2025   1:19 PM CST  To: Mio Frederick MD  Subject: Possible DAPT                                    Hi Dr. Frederick,   We will be seeing this patient within the next week to discuss coronary angiography given a abnormal stress test she had done today which had features of possible multivessel disease. Considering the findings of her EGD in the fall, is dual antiplatelet therapy contraindicated or ok from your standpoint for her? She has remained on her carafate and protonix since then. Thanks. Dalia Birmingham NP    Please call my cell if needed to discuss. [phone]

## 2025-03-06 NOTE — TELEPHONE ENCOUNTER
----- Message from Day K sent at 3/5/2025 12:09 PM CST -----  Regarding: 3/13/25 [facility] ANGIO  Angiogram Orders    Location: [facility] [address], [place] [address] - Main Entrance of the [facility]    Procedure: Coronary Angio w/possible PCI/LHC    Diagnosis: Abnormal stress test, CAD    Procedure Date: 3/13/25    Patient Arrival Time: 9:30    Procedure Time: 4th case to follow    Ordering Cardiologist: Dr. Smith/Sushila Lyn    Performing Cardiologist: Dr. Altamirano only to do    Cardiac Assessment Completed: Yes  Date: 3/5/25  Provider: Sushila Lyn    Pre-Procedure Labs completed: on admit    Post Procedure BRISEIDA appointment scheduled: Yes  Date: 4/3/25  Provider: Dalia Birmingham    Patient Diabetic on Meds/Insulin:  No  Patient on Coumadin/Warfarin:  No  Patient on Pradaxa/Xarelto/Eliquis:  No  Patient on Invokana/Farxiga/Inpefa/Jardiance/Steglatro/Synjardy:  No  Patient on Adlyxin/Bydureon/Byetta/Mounjaro/Ozempic/Rybelsus/Soliquo/Trulicity/Victoza/Wegovy/Zepbound: No    Does Patient have a history of bypass:  No    Appointment was scheduled: Face to Face    Special instructions:  Knapper only to do

## 2025-03-10 DIAGNOSIS — R94.39 ABNORMAL STRESS TEST: Primary | ICD-10-CM

## 2025-03-10 DIAGNOSIS — R94.39 ABNORMAL CARDIOVASCULAR STRESS TEST: ICD-10-CM

## 2025-03-10 DIAGNOSIS — R07.9 CHEST PAIN: ICD-10-CM

## 2025-03-10 DIAGNOSIS — I25.728 CORONARY ARTERY DISEASE OF AUTOLOGOUS BYPASS GRAFT WITH STABLE ANGINA PECTORIS: ICD-10-CM

## 2025-03-10 RX ORDER — SODIUM CHLORIDE 9 MG/ML
INJECTION, SOLUTION INTRAVENOUS CONTINUOUS
Status: CANCELLED | OUTPATIENT
Start: 2025-03-10

## 2025-03-10 RX ORDER — ASPIRIN 81 MG/1
243 TABLET, CHEWABLE ORAL ONCE
Status: CANCELLED | OUTPATIENT
Start: 2025-03-10

## 2025-03-10 RX ORDER — POTASSIUM CHLORIDE 1500 MG/1
20 TABLET, EXTENDED RELEASE ORAL
Status: CANCELLED | OUTPATIENT
Start: 2025-03-10

## 2025-03-10 RX ORDER — ASPIRIN 325 MG
325 TABLET ORAL ONCE
Status: CANCELLED | OUTPATIENT
Start: 2025-03-10 | End: 2025-03-10

## 2025-03-10 RX ORDER — LIDOCAINE 40 MG/G
CREAM TOPICAL
Status: CANCELLED | OUTPATIENT
Start: 2025-03-10

## 2025-03-10 NOTE — TELEPHONE ENCOUNTER
Called patient with coronary angiogram instructions.  Patient verbalized understanding.  Orders entered.  MARKOS Tomlin RN

## 2025-03-13 ENCOUNTER — HOSPITAL ENCOUNTER (OUTPATIENT)
Facility: CLINIC | Age: 70
Discharge: HOME | End: 2025-03-13
Attending: INTERNAL MEDICINE | Admitting: INTERNAL MEDICINE
Payer: MEDICARE

## 2025-03-13 VITALS
HEART RATE: 71 BPM | OXYGEN SATURATION: 98 % | RESPIRATION RATE: 11 BRPM | HEIGHT: 65 IN | WEIGHT: 203 LBS | DIASTOLIC BLOOD PRESSURE: 50 MMHG | SYSTOLIC BLOOD PRESSURE: 106 MMHG | TEMPERATURE: 98.4 F | BODY MASS INDEX: 33.82 KG/M2

## 2025-03-13 DIAGNOSIS — R07.9 CHEST PAIN: ICD-10-CM

## 2025-03-13 DIAGNOSIS — R94.39 ABNORMAL STRESS TEST: ICD-10-CM

## 2025-03-13 DIAGNOSIS — I25.728 CORONARY ARTERY DISEASE OF AUTOLOGOUS BYPASS GRAFT WITH STABLE ANGINA PECTORIS: ICD-10-CM

## 2025-03-13 DIAGNOSIS — R94.39 ABNORMAL CARDIOVASCULAR STRESS TEST: ICD-10-CM

## 2025-03-13 PROBLEM — Z98.890 STATUS POST CORONARY ANGIOGRAM: Status: ACTIVE | Noted: 2025-03-13

## 2025-03-13 LAB
ANION GAP SERPL CALCULATED.3IONS-SCNC: 9 MMOL/L (ref 7–15)
APTT PPP: 24 SECONDS (ref 22–38)
ATRIAL RATE - MUSE: 67 BPM
BUN SERPL-MCNC: 13.1 MG/DL (ref 8–23)
CALCIUM SERPL-MCNC: 9.3 MG/DL (ref 8.8–10.4)
CHLORIDE SERPL-SCNC: 97 MMOL/L (ref 98–107)
CREAT SERPL-MCNC: 0.65 MG/DL (ref 0.51–0.95)
DIASTOLIC BLOOD PRESSURE - MUSE: NORMAL MMHG
EGFRCR SERPLBLD CKD-EPI 2021: >90 ML/MIN/1.73M2
ERYTHROCYTE [DISTWIDTH] IN BLOOD BY AUTOMATED COUNT: 14.5 % (ref 10–15)
GLUCOSE SERPL-MCNC: 102 MG/DL (ref 70–99)
HCO3 SERPL-SCNC: 28 MMOL/L (ref 22–29)
HCT VFR BLD AUTO: 37.6 % (ref 35–47)
HGB BLD-MCNC: 12.5 G/DL (ref 11.7–15.7)
INR PPP: 1.02 (ref 0.85–1.15)
INTERPRETATION ECG - MUSE: NORMAL
MCH RBC QN AUTO: 32.3 PG (ref 26.5–33)
MCHC RBC AUTO-ENTMCNC: 33.2 G/DL (ref 31.5–36.5)
MCV RBC AUTO: 97 FL (ref 78–100)
P AXIS - MUSE: 44 DEGREES
PLATELET # BLD AUTO: 202 10E3/UL (ref 150–450)
POTASSIUM SERPL-SCNC: 4.3 MMOL/L (ref 3.4–5.3)
PR INTERVAL - MUSE: 132 MS
QRS DURATION - MUSE: 144 MS
QT - MUSE: 424 MS
QTC - MUSE: 448 MS
R AXIS - MUSE: 48 DEGREES
RBC # BLD AUTO: 3.87 10E6/UL (ref 3.8–5.2)
SODIUM SERPL-SCNC: 134 MMOL/L (ref 135–145)
SYSTOLIC BLOOD PRESSURE - MUSE: NORMAL MMHG
T AXIS - MUSE: 41 DEGREES
VENTRICULAR RATE- MUSE: 67 BPM
WBC # BLD AUTO: 6.1 10E3/UL (ref 4–11)

## 2025-03-13 PROCEDURE — 93454 CORONARY ARTERY ANGIO S&I: CPT | Performed by: INTERNAL MEDICINE

## 2025-03-13 PROCEDURE — 272N000001 HC OR GENERAL SUPPLY STERILE: Performed by: INTERNAL MEDICINE

## 2025-03-13 PROCEDURE — 93454 CORONARY ARTERY ANGIO S&I: CPT | Mod: 26 | Performed by: INTERNAL MEDICINE

## 2025-03-13 PROCEDURE — 93005 ELECTROCARDIOGRAM TRACING: CPT

## 2025-03-13 PROCEDURE — 85027 COMPLETE CBC AUTOMATED: CPT | Performed by: PHYSICIAN ASSISTANT

## 2025-03-13 PROCEDURE — C1769 GUIDE WIRE: HCPCS | Performed by: INTERNAL MEDICINE

## 2025-03-13 PROCEDURE — C1887 CATHETER, GUIDING: HCPCS | Performed by: INTERNAL MEDICINE

## 2025-03-13 PROCEDURE — 36415 COLL VENOUS BLD VENIPUNCTURE: CPT | Performed by: PHYSICIAN ASSISTANT

## 2025-03-13 PROCEDURE — 250N000011 HC RX IP 250 OP 636: Performed by: INTERNAL MEDICINE

## 2025-03-13 PROCEDURE — 99152 MOD SED SAME PHYS/QHP 5/>YRS: CPT | Mod: GC | Performed by: INTERNAL MEDICINE

## 2025-03-13 PROCEDURE — 85610 PROTHROMBIN TIME: CPT | Performed by: PHYSICIAN ASSISTANT

## 2025-03-13 PROCEDURE — C1894 INTRO/SHEATH, NON-LASER: HCPCS | Performed by: INTERNAL MEDICINE

## 2025-03-13 PROCEDURE — 999N000071 HC STATISTIC HEART CATH LAB OR EP LAB

## 2025-03-13 PROCEDURE — 258N000003 HC RX IP 258 OP 636: Performed by: PHYSICIAN ASSISTANT

## 2025-03-13 PROCEDURE — 85730 THROMBOPLASTIN TIME PARTIAL: CPT | Performed by: PHYSICIAN ASSISTANT

## 2025-03-13 PROCEDURE — 250N000009 HC RX 250: Performed by: INTERNAL MEDICINE

## 2025-03-13 PROCEDURE — 80048 BASIC METABOLIC PNL TOTAL CA: CPT | Performed by: PHYSICIAN ASSISTANT

## 2025-03-13 PROCEDURE — 999N000184 HC STATISTIC TELEMETRY

## 2025-03-13 PROCEDURE — 999N000054 HC STATISTIC EKG NON-CHARGEABLE

## 2025-03-13 PROCEDURE — 82565 ASSAY OF CREATININE: CPT | Performed by: PHYSICIAN ASSISTANT

## 2025-03-13 PROCEDURE — 250N000013 HC RX MED GY IP 250 OP 250 PS 637: Performed by: HOSPITALIST

## 2025-03-13 PROCEDURE — 99152 MOD SED SAME PHYS/QHP 5/>YRS: CPT | Performed by: INTERNAL MEDICINE

## 2025-03-13 RX ORDER — ACETAMINOPHEN 325 MG/1
650 TABLET ORAL EVERY 4 HOURS PRN
Status: DISCONTINUED | OUTPATIENT
Start: 2025-03-13 | End: 2025-03-13 | Stop reason: HOSPADM

## 2025-03-13 RX ORDER — NITROGLYCERIN 5 MG/ML
VIAL (ML) INTRAVENOUS
Status: DISCONTINUED | OUTPATIENT
Start: 2025-03-13 | End: 2025-03-13 | Stop reason: HOSPADM

## 2025-03-13 RX ORDER — NALOXONE HYDROCHLORIDE 0.4 MG/ML
0.2 INJECTION, SOLUTION INTRAMUSCULAR; INTRAVENOUS; SUBCUTANEOUS
Status: DISCONTINUED | OUTPATIENT
Start: 2025-03-13 | End: 2025-03-13 | Stop reason: HOSPADM

## 2025-03-13 RX ORDER — SODIUM CHLORIDE 9 MG/ML
INJECTION, SOLUTION INTRAVENOUS CONTINUOUS
Status: DISCONTINUED | OUTPATIENT
Start: 2025-03-13 | End: 2025-03-13 | Stop reason: HOSPADM

## 2025-03-13 RX ORDER — OXYCODONE HYDROCHLORIDE 5 MG/1
10 TABLET ORAL EVERY 4 HOURS PRN
Status: DISCONTINUED | OUTPATIENT
Start: 2025-03-13 | End: 2025-03-13 | Stop reason: HOSPADM

## 2025-03-13 RX ORDER — ASPIRIN 81 MG/1
243 TABLET, CHEWABLE ORAL ONCE
Status: COMPLETED | OUTPATIENT
Start: 2025-03-13 | End: 2025-03-13

## 2025-03-13 RX ORDER — LIDOCAINE 40 MG/G
CREAM TOPICAL
Status: DISCONTINUED | OUTPATIENT
Start: 2025-03-13 | End: 2025-03-13 | Stop reason: HOSPADM

## 2025-03-13 RX ORDER — FENTANYL CITRATE 50 UG/ML
INJECTION, SOLUTION INTRAMUSCULAR; INTRAVENOUS
Status: DISCONTINUED | OUTPATIENT
Start: 2025-03-13 | End: 2025-03-13 | Stop reason: HOSPADM

## 2025-03-13 RX ORDER — IOPAMIDOL 755 MG/ML
INJECTION, SOLUTION INTRAVASCULAR
Status: DISCONTINUED | OUTPATIENT
Start: 2025-03-13 | End: 2025-03-13 | Stop reason: HOSPADM

## 2025-03-13 RX ORDER — ASPIRIN 325 MG
325 TABLET ORAL ONCE
Status: COMPLETED | OUTPATIENT
Start: 2025-03-13 | End: 2025-03-13

## 2025-03-13 RX ORDER — NALOXONE HYDROCHLORIDE 0.4 MG/ML
0.4 INJECTION, SOLUTION INTRAMUSCULAR; INTRAVENOUS; SUBCUTANEOUS
Status: DISCONTINUED | OUTPATIENT
Start: 2025-03-13 | End: 2025-03-13 | Stop reason: HOSPADM

## 2025-03-13 RX ORDER — CALCIUM CARBONATE 500 MG/1
1 TABLET, CHEWABLE ORAL 2 TIMES DAILY
COMMUNITY

## 2025-03-13 RX ORDER — HEPARIN SODIUM 1000 [USP'U]/ML
INJECTION, SOLUTION INTRAVENOUS; SUBCUTANEOUS
Status: DISCONTINUED | OUTPATIENT
Start: 2025-03-13 | End: 2025-03-13 | Stop reason: HOSPADM

## 2025-03-13 RX ORDER — OXYCODONE HYDROCHLORIDE 5 MG/1
5 TABLET ORAL EVERY 4 HOURS PRN
Status: DISCONTINUED | OUTPATIENT
Start: 2025-03-13 | End: 2025-03-13 | Stop reason: HOSPADM

## 2025-03-13 RX ORDER — ATROPINE SULFATE 0.1 MG/ML
0.5 INJECTION INTRAVENOUS
Status: DISCONTINUED | OUTPATIENT
Start: 2025-03-13 | End: 2025-03-13 | Stop reason: HOSPADM

## 2025-03-13 RX ORDER — POTASSIUM CHLORIDE 1500 MG/1
20 TABLET, EXTENDED RELEASE ORAL
Status: DISCONTINUED | OUTPATIENT
Start: 2025-03-13 | End: 2025-03-13 | Stop reason: HOSPADM

## 2025-03-13 RX ORDER — VERAPAMIL HYDROCHLORIDE 2.5 MG/ML
INJECTION, SOLUTION INTRAVENOUS
Status: DISCONTINUED | OUTPATIENT
Start: 2025-03-13 | End: 2025-03-13 | Stop reason: HOSPADM

## 2025-03-13 RX ORDER — FENTANYL CITRATE 50 UG/ML
25 INJECTION, SOLUTION INTRAMUSCULAR; INTRAVENOUS
Status: DISCONTINUED | OUTPATIENT
Start: 2025-03-13 | End: 2025-03-13 | Stop reason: HOSPADM

## 2025-03-13 RX ORDER — FLUMAZENIL 0.1 MG/ML
0.2 INJECTION, SOLUTION INTRAVENOUS
Status: DISCONTINUED | OUTPATIENT
Start: 2025-03-13 | End: 2025-03-13 | Stop reason: HOSPADM

## 2025-03-13 RX ADMIN — SODIUM CHLORIDE: 0.9 INJECTION, SOLUTION INTRAVENOUS at 09:52

## 2025-03-13 RX ADMIN — ACETAMINOPHEN 650 MG: 325 TABLET, FILM COATED ORAL at 14:12

## 2025-03-13 ASSESSMENT — ACTIVITIES OF DAILY LIVING (ADL)
ADLS_ACUITY_SCORE: 46

## 2025-03-13 NOTE — DISCHARGE INSTRUCTIONS
Cardiac Angiogram Discharge Instructions - Radial    After you go home:    Have an adult stay with you until tomorrow.  Drink extra fluids for 2 days.  You may resume your normal diet.  No smoking       For 24 hours - due to the sedation you received:  Relax and take it easy.  Do NOT make any important or legal decisions.  Do NOT drive or operate machines at home or at work.  Do NOT drink alcohol.    Care of Wrist Puncture Site:    For the first 24 hrs - check the puncture site every 1-2 hours while awake.  It is normal to have soreness at the puncture site and mild tingling in your hand for up to 3 days.  Remove the bandaid after 24 hours. If there is minor oozing, apply another bandaid and remove it after 12 hours.  You may shower tomorrow.  Do NOT take a bath, or use a hot tub or pool for at least 3 days. Do NOT scrub the site. Do not use lotion or powder near the puncture site.           Activity:        For 2 days:   do not use your hand or arm to support your weight (such as rising from a chair)   do not bend your wrist (such as lifting a garage door).  do not lift more than 5 pounds or exercise your arm (such as tennis, golf or bowling).  Do NOT do any heavy activity such as exercise, lifting, or straining.     Bleeding:    If you start bleeding from the site in your wrist, sit down and press firmly on/above the site for 10 minutes.   Once bleeding stops, keep arm still for 2 hours.   Call [facility] as soon as you can.       Call 911 right away if you have heavy bleeding or bleeding that does not stop.      Medicines:    Take your medications, including blood thinners, unless your provider tells you not to.    If you have stopped any medicines, check with your provider about when to restart them.    Follow Up Appointments:    Follow up with [facility] Nurse Practitioner at [facility] of patient preference in 7-10 days.    Call the clinic if:    You have a large or growing hard lump around the site.  The  site is red, swollen, hot or tender.  Blood or fluid is draining from the site.  You have chills or a fever greater than 101 F (38 C).  Your arm feels numb, cool or changes color.  You have hives, a rash or unusual itching.  Any questions or concerns.          [facility] [place]:    [phone] [facility] (7 days a week)    Or you may contact your provider via My Chart

## 2025-03-13 NOTE — PROGRESS NOTES
Care Suites Post Procedure Note    Patient Information  Name: Yessy Kuhn  Age: 69 year old    Post Procedure  Time patient returned to Care Suites: 1330  Concerns/abnormal assessment: None  If abnormal assessment, provider notified: N/A  Plan/Other: proceed with recovery and discharge as planned    Samantha Sommers RN

## 2025-03-13 NOTE — PROGRESS NOTES
Care Suites Discharge Nursing Note    Patient Information  Name: Yessy Kuhn  Age: 69 year old    Discharge Education:  Discharge instructions reviewed: Yes  Additional education/resources provided: None  Patient/patient representative verbalizes understanding: Yes  Patient discharging on new medications: No  Medication education completed: N/A    Discharge Plans:   Discharge location: home  Discharge ride contacted: Yes  Approximate discharge time: 1605    Discharge Criteria:  Discharge criteria met and vital signs stable: Yes    Patient Belongs:  Patient belongings returned to patient: Yes    Samantha Sommers RN

## 2025-03-13 NOTE — PROGRESS NOTES
Care Suites Admission Nursing Note    Patient Information  Name: Yessy Kuhn  Age: 69 year old  Reason for admission: coronary angiogram  Care Suites arrival time: 0930    Visitor Information  Name: Sam     Patient Admission/Assessment   Pre-procedure assessment complete: Yes  If abnormal assessment/labs, provider notified: N/A  NPO: Yes  Medications held per instructions/orders: N/A  Consent: obtained  If applicable, pregnancy test status: deferred  Patient oriented to room: Yes  Education/questions answered: Yes  Plan/other: proceed with procedure as planned    Discharge Planning  Discharge name/phone number: Sam  Overnight post sedation caregiver: Sam  Discharge location: [place]    Samantha Sommers RN

## 2025-04-03 ENCOUNTER — OFFICE VISIT (OUTPATIENT)
Dept: CARDIOLOGY | Facility: CLINIC | Age: 70
End: 2025-04-03
Payer: MEDICARE

## 2025-04-03 VITALS
DIASTOLIC BLOOD PRESSURE: 73 MMHG | HEART RATE: 80 BPM | HEIGHT: 65 IN | SYSTOLIC BLOOD PRESSURE: 154 MMHG | BODY MASS INDEX: 34.31 KG/M2

## 2025-04-03 DIAGNOSIS — I35.1 AORTIC EJECTION MURMUR: ICD-10-CM

## 2025-04-03 DIAGNOSIS — R07.2 PRECORDIAL PAIN: Primary | ICD-10-CM

## 2025-04-03 DIAGNOSIS — I49.1 PREMATURE ATRIAL CONTRACTION: ICD-10-CM

## 2025-04-03 DIAGNOSIS — I10 BENIGN ESSENTIAL HYPERTENSION: ICD-10-CM

## 2025-04-03 NOTE — PATIENT INSTRUCTIONS
Today's Recommendations    STOP the aspirin   I messaged Dr. Smith about your stress test, we will get back to you once he reviews it but you had normal heart arteries  Continue all other medications without changes.  Please follow up with Dr. Smith in 1 year.    Please send a [employer] message or call [phone] to the RN team with questions or concerns.     Scheduling number [phone]  JAMMIE Brady, CNP

## 2025-04-03 NOTE — LETTER
4/3/2025    Benji Damon PA-C  [address]  [place] [address]    RE: Yessy Kuhn       Dear Colleague,     I had the pleasure of seeing Yessy Kuhn in the [facility].    Cardiology Clinic Progress Note  Yessy Kuhn MRN# [id number]   YOB: 1955 Age: 69 year old   Primary Cardiologist: Dr. Smith  Reason for visit:  Post angiogram follow up            Assessment and Plan:       1.  Exertional chest pain with recent diagnosis of GERD  -Underwent EGD showing erosive gastropathy as well as esophagitis, some symptom improvement following start of Protonix, responsive to Tums however follows a typical anginal pattern  -3/13/25 Underwent coronary angiogram demonstrating angiographically normal coronary arteries   -Symptomatically improving following longer duration of carafate and lifestyle modifications     2. Normotension with proven whitecoat hypertension  -9/2024 24-hour blood pressure monitor showing borderline hypotension with a daytime average of 106/56 and nighttime 84/44-> lisinopril-hydrochlorothiazide reduced     3. Aortic sclerosis without stenosis   -9/2024 TTE showing aortic sclerosis with mild regurgitation, stable on SOPHIE 3/2025    4. PACs  -Noted on previous EKG in August 2024  -Holter monitor without significant PAC burden    5.  Dyslipidemia, well-controlled on atorvastatin without evidence of hepatic toxicity      Plan:  Continue PCP follow-up for GERD symptoms, continue Protonix, carafate, and Tums PRN  STOP aspirin   Continue lisinopril-HCTZ to 10 mg - 12.5 mg daily   Continue atorvastatin 20 mg daily    Follow up: Follow-up with Dr. Leroy Lopez in 1 year         History of Presenting Illness:    Yessy Kuhn is a very pleasant 69 year old female with a history of hypertension, PACs, and cardiac murmur.    She was evaluated by Dr. Leroy Lopez in August 2024 for a longstanding murmur and was not experiencing any concerning cardiac  symptoms.  He did note frequent PACs on EKG performed during that visit.  She subsequently wore a Holter monitor which showed an average heart rate of 57 with a minimum of 44 overnight and a maximum of 96, no significant ectopic burden or arrhythmias.  An echocardiogram was completed in September which showed normal left ventricular ejection fraction and systolic function with no regional wall motion abnormalities, trileaflet aortic valve with sclerosis, without stenosis, trace aortic and mitral regurgitation.  A 24-hour blood pressure monitor showed a daytime average of 106/56, nighttime 84/44 and overall average of 100/53.  Lab work was also done including a lipid profile which showed total cholesterol 45, HDL 73, LDL 62, triglycerides 51, BMP with sodium 137, potassium 4.0, BUN 30.3, creatinine 0.56, GFR rater than 90, a CBC with hemoglobin 12.5, platelets 198, a TSH was completed in August which was normal at 3.4 as well as magnesium which was 2.1.    After her last visit with Dr. Leroy Lopez in the fall 2024 she began to develop reflux symptoms. She had an EGD which showed grade B esophagitis and erosive gastropathy. She was diagnosed with GERD and had some relief with protonix. When we met in February, she was having exertional chest discomfort which was limiting her ability to exercise. She underwent a stress echocardiogram that demonstrated multiple regional wall motion abnormalities postarrest including hypokinesis of the basal and mid anterolateral/inferolateral segments, mid anterior segment and apex, and basal and mid inferior/inferoseptal segments.  The stress EKG was positive for ischemia.  She did have symptoms of chest burning with exercise which resolved in early recovery.  This was felt to be a high risk stress test with possible multivessel coronary artery disease therefore coronary angiography was recommended.  This was completed on 3/13/2025 and showed angiographically normal coronary  arteries.    Patient is here today for a follow up of her coronary angiogram. She has seen improvement in her chest burning symptoms using the carafate and is using less tums. Once every 15 days she will be up at night with chest burning symptoms. She is no longer drinking coffee, smaller portions, and not eating later at night.     She is now able to exert herself with land workouts, but is not yet back to pool workouts due to the amount of bouncing.     Denies dizziness, lightheadedness or other presyncopal symptoms. Denies tachycardia or palpitations.  Denies shortness of breath, orthopnea, or PND.  She does have some mild lower extremity edema and wears compression stocking daily.    Blood pressure 154/73 and HR 80 in clinic today.        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  Social History     Socioeconomic History     Marital status:      Spouse name: Sam     Number of children: 3     Years of education: 16     Highest education level: Not on file   Occupational History     Occupation: Day Care Provider   Tobacco Use     Smoking status: Never     Smokeless tobacco: Never   Vaping Use     Vaping status: Never Used   Substance and Sexual Activity     Alcohol use: No     Drug use: No     Comment: CBD gummies for sleep     Sexual activity: Never     Partners: Male   Other Topics Concern      Service No     Blood Transfusions No     Caffeine Concern Yes     Comment: 4-6 cups of coffee per day and 2 sodas per day     Occupational Exposure No     Hobby Hazards No     Sleep Concern Yes     Comment: difficulty getting to sleep     Stress Concern Yes     Comment: teenagers at home     Weight Concern Yes     Special Diet No     Back Care No     Exercise No     Bike Helmet No     Seat Belt Yes     Self-Exams Yes     Comment: occasional     Parent/sibling w/ CABG, MI or angioplasty before 65F 55M? No   Social  History Narrative     Not on file     Social Drivers of Health     Financial Resource Strain: Low Risk  (1/16/2024)    Financial Resource Strain      Within the past 12 months, have you or your family members you live with been unable to get utilities (heat, electricity) when it was really needed?: No   Food Insecurity: Low Risk  (1/16/2024)    Food Insecurity      Within the past 12 months, did you worry that your food would run out before you got money to buy more?: No      Within the past 12 months, did the food you bought just not last and you didn t have money to get more?: No   Transportation Needs: Low Risk  (1/16/2024)    Transportation Needs      Within the past 12 months, has lack of transportation kept you from medical appointments, getting your medicines, non-medical meetings or appointments, work, or from getting things that you need?: No   Physical Activity: Insufficiently Active (10/30/2024)    Exercise Vital Sign      Days of Exercise per Week: 3 days      Minutes of Exercise per Session: 40 min   Stress: No Stress Concern Present (10/30/2024)    [employer] [place] [employer] - Occupational Stress Questionnaire      Feeling of Stress : Not at all   Social Connections: Unknown (10/30/2024)    Social Connection and Isolation Panel [NHANES]      Frequency of Communication with Friends and Family: Not on file      Frequency of Social Gatherings with Friends and Family: Twice a week      Attends [employer] Services: Not on file      Active Member of Clubs or Organizations: Not on file      Attends Club or Organization Meetings: Not on file      Marital Status: Not on file   Interpersonal Safety: Low Risk  (3/13/2025)    Interpersonal Safety      Do you feel physically and emotionally safe where you currently live?: Yes      Within the past 12 months, have you been hit, slapped, kicked or otherwise physically hurt by someone?: No      Within the past 12 months, have you been humiliated or  "emotionally abused in other ways by your partner or ex-partner?: No   Housing Stability: Low Risk  (1/16/2024)    Housing Stability      Do you have housing? : Yes      Are you worried about losing your housing?: No            Review of Systems:   Skin:        Eyes:       ENT:       Respiratory:  Negative    Cardiovascular:  Negative, palpitations, chest pain, syncope or near-syncope, cyanosis, lightheadedness, dizziness, fatigue Positive for, edema  Gastroenterology: Positive for reflux  Genitourinary:       Musculoskeletal:  Positive for foot pain  Neurologic:       Psychiatric:       Heme/Lymph/Imm:       Endocrine:              Physical Exam:   Vitals: BP (!) 154/73   Pulse 80   Ht 1.638 m (5' 4.5\")   LMP 04/17/2007 (Approximate)   BMI 34.31 kg/m     Wt Readings from Last 4 Encounters:   03/13/25 92.1 kg (203 lb)   02/03/25 95.3 kg (210 lb)   08/26/24 90.5 kg (199 lb 9.6 oz)   01/23/24 86.7 kg (191 lb 3.2 oz)     GEN: well nourished, in no acute distress.  HEENT:  Pupils equal, round. Sclerae nonicteric.   NECK: Supple, no masses appreciated.   C/V:  Regular rate and rhythm, III/VI systolic murmur   RESP: Respirations are unlabored. Clear to auscultation bilaterally without wheezing, rales, or rhonchi.  GI: Abdomen soft, nontender.  EXTREM: Bilateral trace LE edema.  NEURO: Alert and oriented, cooperative.  SKIN: Warm and dry.        Data:     LIPID RESULTS:  Lab Results   Component Value Date    CHOL 145 11/27/2024    CHOL 158 04/18/2016    HDL 73 11/27/2024    HDL 48 (L) 04/18/2016    LDL 62 11/27/2024    LDL 96 04/18/2016    TRIG 51 11/27/2024    TRIG 68 04/18/2016    CHOLHDLRATIO 3.6 12/18/2006     LIVER ENZYME RESULTS:  Lab Results   Component Value Date    AST 34 06/19/2023    AST 17 04/18/2016    ALT 27 06/19/2023    ALT 24 04/18/2016     CBC RESULTS:  Lab Results   Component Value Date    WBC 6.1 03/13/2025    WBC 2.9 (L) 12/14/2020    RBC 3.87 03/13/2025    RBC 3.83 12/14/2020    HGB 12.5 03/13/2025 " "   HGB 12.8 12/14/2020    HCT 37.6 03/13/2025    HCT 39.2 12/14/2020    MCV 97 03/13/2025     (H) 12/14/2020    MCH 32.3 03/13/2025    MCH 33.4 (H) 12/14/2020    MCHC 33.2 03/13/2025    MCHC 32.7 12/14/2020    RDW 14.5 03/13/2025    RDW 12.3 12/14/2020     03/13/2025     12/14/2020     BMP RESULTS:  Lab Results   Component Value Date     (L) 03/13/2025     12/14/2020    POTASSIUM 4.3 03/13/2025    POTASSIUM 4.2 06/16/2022    POTASSIUM 4.8 12/14/2020    CHLORIDE 97 (L) 03/13/2025    CHLORIDE 96 06/16/2022    CHLORIDE 106 12/14/2020    CO2 28 03/13/2025    CO2 28 06/16/2022    CO2 26 12/14/2020    ANIONGAP 9 03/13/2025    ANIONGAP 6 06/16/2022    ANIONGAP 6 12/14/2020     (H) 03/13/2025    GLC 85 06/16/2022    GLC 87 12/14/2020    BUN 13.1 03/13/2025    BUN 16 06/16/2022    BUN 13 12/14/2020    CR 0.65 03/13/2025    CR 0.59 12/14/2020    GFRESTIMATED >90 03/13/2025    GFRESTIMATED >90 12/14/2020    GFRESTBLACK >90 12/14/2020    KRISTEN 9.3 03/13/2025    KRISTEN 9.5 12/14/2020      A1C RESULTS:  No results found for: \"A1C\"  INR RESULTS:  Lab Results   Component Value Date    INR 1.02 03/13/2025    INR 1.00 03/05/2019            Medications     Current Outpatient Medications   Medication Sig Dispense Refill     acetaminophen (TYLENOL) 325 MG tablet Take 650 mg by mouth 2 times daily as needed for mild pain       aspirin (ASA) 81 MG chewable tablet Take 1 tablet (81 mg) by mouth daily.       atorvastatin (LIPITOR) 20 MG tablet TAKE 1 TABLET(20 MG) BY MOUTH DAILY 90 tablet 2     calcium carbonate (TUMS) 500 MG chewable tablet Take 1 chew tab by mouth 2 times daily.       lisinopril-hydrochlorothiazide (ZESTORETIC) 10-12.5 MG tablet Take 1 tablet by mouth daily. 90 tablet 3     LORazepam (ATIVAN) 0.5 MG tablet Take 1 tablet (0.5 mg) by mouth every 6 hours as needed for anxiety. 20 tablet 0     melatonin 5 MG tablet Take 10 mg by mouth nightly as needed for sleep (2 x 5mg = 10mg)       " nitroGLYcerin (NITROSTAT) 0.4 MG sublingual tablet For chest pain place 1 tablet under the tongue. If symptoms persist repeat every 5 minutes for up to 3 doses. If symptoms persist 5 minutes after 1st dose also call 911. 25 tablet 3     pantoprazole (PROTONIX) 40 MG EC tablet Take 1 tablet (40 mg) by mouth 2 times daily. 1 in morning , 1 in evening 180 tablet 1     sucralfate (CARAFATE) 1 GM tablet Take 1 tablet (1 g) by mouth 4 times daily. 120 tablet 1          Past Medical History     Past Medical History:   Diagnosis Date     Arthritis      Hypertension      Irritable bowel syndrome     constipation     Mitral valve prolapse      PONV (postoperative nausea and vomiting)      Presence of intrauterine contraceptive device 1997 removed 2014     Past Surgical History:   Procedure Laterality Date     ABDOMEN SURGERY  2/91     ARTHROPLASTY KNEE Right 03/11/2019    Procedure: RIGHT TOTAL KNEE ARTHROPLASTY;  Surgeon: Tate Chon MD;  Location:  OR     CHOLECYSTECTOMY, OPEN  01/01/1991     COLONOSCOPY  2014     CV CORONARY ANGIOGRAM N/A 3/13/2025    Procedure: Coronary Angiogram;  Surgeon: Chaparro Altamirano MD;  Location:  HEART CARDIAC CATH LAB     Family History   Problem Relation Age of Onset     Respiratory Mother         emphysema - heavy smoker     Depression Mother      Substance Abuse Mother      Cancer Father         bladder and lung at age 50     Other Cancer Father         Heavy smoker; bladder, lung, pancreatic     Coronary Artery Disease Sister         Age 73, heavy smoker, poor health, copd     Cerebrovascular Disease Sister         TIA Jan 2023     Depression Sister      Substance Abuse Brother      Substance Abuse Maternal Grandfather      Diabetes Paternal Grandmother         adult onset     Arthritis Sister      Breast Cancer Cousin      Substance Abuse Brother             Allergies   Antihistamine [antihistamines, chlorpheniramine-type]; Diphenhydramine; and  Andreia Birmingham NP  [facility]       Thank you for allowing me to participate in the care of your patient.      Sincerely,     Sara Birmingham NP     [facility]  cc:   No referring provider defined for this encounter.

## 2025-04-03 NOTE — PROGRESS NOTES
Cardiology Clinic Progress Note  Yessy Kuhn MRN# [id number]   YOB: 1955 Age: 69 year old   Primary Cardiologist: Dr. Smith  Reason for visit:  Post angiogram follow up            Assessment and Plan:       1.  Exertional chest pain with recent diagnosis of GERD  -Underwent EGD showing erosive gastropathy as well as esophagitis, some symptom improvement following start of Protonix, responsive to Tums however follows a typical anginal pattern  -3/13/25 Underwent coronary angiogram demonstrating angiographically normal coronary arteries   -Symptomatically improving following longer duration of carafate and lifestyle modifications     2. Normotension with proven whitecoat hypertension  -9/2024 24-hour blood pressure monitor showing borderline hypotension with a daytime average of 106/56 and nighttime 84/44-> lisinopril-hydrochlorothiazide reduced     3. Aortic sclerosis without stenosis   -9/2024 TTE showing aortic sclerosis with mild regurgitation, stable on SOPHIE 3/2025    4. PACs  -Noted on previous EKG in August 2024  -Holter monitor without significant PAC burden    5.  Dyslipidemia, well-controlled on atorvastatin without evidence of hepatic toxicity      Plan:  Continue PCP follow-up for GERD symptoms, continue Protonix, carafate, and Tums PRN  STOP aspirin   Continue lisinopril-HCTZ to 10 mg - 12.5 mg daily   Continue atorvastatin 20 mg daily    Follow up: Follow-up with Dr. Leroy Lopez in 1 year         History of Presenting Illness:    Yessy Kuhn is a very pleasant 69 year old female with a history of hypertension, PACs, and cardiac murmur.    She was evaluated by Dr. Leroy Lopez in August 2024 for a longstanding murmur and was not experiencing any concerning cardiac symptoms.  He did note frequent PACs on EKG performed during that visit.  She subsequently wore a Holter monitor which showed an average heart rate of 57 with a minimum of 44 overnight and a maximum of 96, no  significant ectopic burden or arrhythmias.  An echocardiogram was completed in September which showed normal left ventricular ejection fraction and systolic function with no regional wall motion abnormalities, trileaflet aortic valve with sclerosis, without stenosis, trace aortic and mitral regurgitation.  A 24-hour blood pressure monitor showed a daytime average of 106/56, nighttime 84/44 and overall average of 100/53.  Lab work was also done including a lipid profile which showed total cholesterol 45, HDL 73, LDL 62, triglycerides 51, BMP with sodium 137, potassium 4.0, BUN 30.3, creatinine 0.56, GFR rater than 90, a CBC with hemoglobin 12.5, platelets 198, a TSH was completed in August which was normal at 3.4 as well as magnesium which was 2.1.    After her last visit with Dr. Leroy Lopez in the fall 2024 she began to develop reflux symptoms. She had an EGD which showed grade B esophagitis and erosive gastropathy. She was diagnosed with GERD and had some relief with protonix. When we met in February, she was having exertional chest discomfort which was limiting her ability to exercise. She underwent a stress echocardiogram that demonstrated multiple regional wall motion abnormalities postarrest including hypokinesis of the basal and mid anterolateral/inferolateral segments, mid anterior segment and apex, and basal and mid inferior/inferoseptal segments.  The stress EKG was positive for ischemia.  She did have symptoms of chest burning with exercise which resolved in early recovery.  This was felt to be a high risk stress test with possible multivessel coronary artery disease therefore coronary angiography was recommended.  This was completed on 3/13/2025 and showed angiographically normal coronary arteries.    Patient is here today for a follow up of her coronary angiogram. She has seen improvement in her chest burning symptoms using the carafate and is using less tums. Once every 15 days she will be up at night  with chest burning symptoms. She is no longer drinking coffee, smaller portions, and not eating later at night.     She is now able to exert herself with land workouts, but is not yet back to pool workouts due to the amount of bouncing.     Denies dizziness, lightheadedness or other presyncopal symptoms. Denies tachycardia or palpitations.  Denies shortness of breath, orthopnea, or PND.  She does have some mild lower extremity edema and wears compression stocking daily.    Blood pressure 154/73 and HR 80 in clinic today.        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  Social History     Socioeconomic History    Marital status:      Spouse name: Sam    Number of children: 3    Years of education: 16    Highest education level: Not on file   Occupational History    Occupation: Day Care Provider   Tobacco Use    Smoking status: Never    Smokeless tobacco: Never   Vaping Use    Vaping status: Never Used   Substance and Sexual Activity    Alcohol use: No    Drug use: No     Comment: CBD gummies for sleep    Sexual activity: Never     Partners: Male   Other Topics Concern     Service No    Blood Transfusions No    Caffeine Concern Yes     Comment: 4-6 cups of coffee per day and 2 sodas per day    Occupational Exposure No    Hobby Hazards No    Sleep Concern Yes     Comment: difficulty getting to sleep    Stress Concern Yes     Comment: teenagers at home    Weight Concern Yes    Special Diet No    Back Care No    Exercise No    Bike Helmet No    Seat Belt Yes    Self-Exams Yes     Comment: occasional    Parent/sibling w/ CABG, MI or angioplasty before 65F 55M? No   Social History Narrative    Not on file     Social Drivers of Health     Financial Resource Strain: Low Risk  (1/16/2024)    Financial Resource Strain     Within the past 12 months, have you or your family members you live with been unable to get utilities  (heat, electricity) when it was really needed?: No   Food Insecurity: Low Risk  (1/16/2024)    Food Insecurity     Within the past 12 months, did you worry that your food would run out before you got money to buy more?: No     Within the past 12 months, did the food you bought just not last and you didn t have money to get more?: No   Transportation Needs: Low Risk  (1/16/2024)    Transportation Needs     Within the past 12 months, has lack of transportation kept you from medical appointments, getting your medicines, non-medical meetings or appointments, work, or from getting things that you need?: No   Physical Activity: Insufficiently Active (10/30/2024)    Exercise Vital Sign     Days of Exercise per Week: 3 days     Minutes of Exercise per Session: 40 min   Stress: No Stress Concern Present (10/30/2024)    [employer] [place] [employer] - Occupational Stress Questionnaire     Feeling of Stress : Not at all   Social Connections: Unknown (10/30/2024)    Social Connection and Isolation Panel [NHANES]     Frequency of Communication with Friends and Family: Not on file     Frequency of Social Gatherings with Friends and Family: Twice a week     Attends Church Services: Not on file     Active Member of Clubs or Organizations: Not on file     Attends Club or Organization Meetings: Not on file     Marital Status: Not on file   Interpersonal Safety: Low Risk  (3/13/2025)    Interpersonal Safety     Do you feel physically and emotionally safe where you currently live?: Yes     Within the past 12 months, have you been hit, slapped, kicked or otherwise physically hurt by someone?: No     Within the past 12 months, have you been humiliated or emotionally abused in other ways by your partner or ex-partner?: No   Housing Stability: Low Risk  (1/16/2024)    Housing Stability     Do you have housing? : Yes     Are you worried about losing your housing?: No            Review of Systems:   Skin:        Eyes:      "  ENT:       Respiratory:  Negative    Cardiovascular:  Negative, palpitations, chest pain, syncope or near-syncope, cyanosis, lightheadedness, dizziness, fatigue Positive for, edema  Gastroenterology: Positive for reflux  Genitourinary:       Musculoskeletal:  Positive for foot pain  Neurologic:       Psychiatric:       Heme/Lymph/Imm:       Endocrine:              Physical Exam:   Vitals: BP (!) 154/73   Pulse 80   Ht 1.638 m (5' 4.5\")   LMP 04/17/2007 (Approximate)   BMI 34.31 kg/m     Wt Readings from Last 4 Encounters:   03/13/25 92.1 kg (203 lb)   02/03/25 95.3 kg (210 lb)   08/26/24 90.5 kg (199 lb 9.6 oz)   01/23/24 86.7 kg (191 lb 3.2 oz)     GEN: well nourished, in no acute distress.  HEENT:  Pupils equal, round. Sclerae nonicteric.   NECK: Supple, no masses appreciated.   C/V:  Regular rate and rhythm, III/VI systolic murmur   RESP: Respirations are unlabored. Clear to auscultation bilaterally without wheezing, rales, or rhonchi.  GI: Abdomen soft, nontender.  EXTREM: Bilateral trace LE edema.  NEURO: Alert and oriented, cooperative.  SKIN: Warm and dry.        Data:     LIPID RESULTS:  Lab Results   Component Value Date    CHOL 145 11/27/2024    CHOL 158 04/18/2016    HDL 73 11/27/2024    HDL 48 (L) 04/18/2016    LDL 62 11/27/2024    LDL 96 04/18/2016    TRIG 51 11/27/2024    TRIG 68 04/18/2016    CHOLHDLRATIO 3.6 12/18/2006     LIVER ENZYME RESULTS:  Lab Results   Component Value Date    AST 34 06/19/2023    AST 17 04/18/2016    ALT 27 06/19/2023    ALT 24 04/18/2016     CBC RESULTS:  Lab Results   Component Value Date    WBC 6.1 03/13/2025    WBC 2.9 (L) 12/14/2020    RBC 3.87 03/13/2025    RBC 3.83 12/14/2020    HGB 12.5 03/13/2025    HGB 12.8 12/14/2020    HCT 37.6 03/13/2025    HCT 39.2 12/14/2020    MCV 97 03/13/2025     (H) 12/14/2020    MCH 32.3 03/13/2025    MCH 33.4 (H) 12/14/2020    MCHC 33.2 03/13/2025    MCHC 32.7 12/14/2020    RDW 14.5 03/13/2025    RDW 12.3 12/14/2020     " "03/13/2025     12/14/2020     BMP RESULTS:  Lab Results   Component Value Date     (L) 03/13/2025     12/14/2020    POTASSIUM 4.3 03/13/2025    POTASSIUM 4.2 06/16/2022    POTASSIUM 4.8 12/14/2020    CHLORIDE 97 (L) 03/13/2025    CHLORIDE 96 06/16/2022    CHLORIDE 106 12/14/2020    CO2 28 03/13/2025    CO2 28 06/16/2022    CO2 26 12/14/2020    ANIONGAP 9 03/13/2025    ANIONGAP 6 06/16/2022    ANIONGAP 6 12/14/2020     (H) 03/13/2025    GLC 85 06/16/2022    GLC 87 12/14/2020    BUN 13.1 03/13/2025    BUN 16 06/16/2022    BUN 13 12/14/2020    CR 0.65 03/13/2025    CR 0.59 12/14/2020    GFRESTIMATED >90 03/13/2025    GFRESTIMATED >90 12/14/2020    GFRESTBLACK >90 12/14/2020    KRISTEN 9.3 03/13/2025    KRISTEN 9.5 12/14/2020      A1C RESULTS:  No results found for: \"A1C\"  INR RESULTS:  Lab Results   Component Value Date    INR 1.02 03/13/2025    INR 1.00 03/05/2019            Medications     Current Outpatient Medications   Medication Sig Dispense Refill    acetaminophen (TYLENOL) 325 MG tablet Take 650 mg by mouth 2 times daily as needed for mild pain      aspirin (ASA) 81 MG chewable tablet Take 1 tablet (81 mg) by mouth daily.      atorvastatin (LIPITOR) 20 MG tablet TAKE 1 TABLET(20 MG) BY MOUTH DAILY 90 tablet 2    calcium carbonate (TUMS) 500 MG chewable tablet Take 1 chew tab by mouth 2 times daily.      lisinopril-hydrochlorothiazide (ZESTORETIC) 10-12.5 MG tablet Take 1 tablet by mouth daily. 90 tablet 3    LORazepam (ATIVAN) 0.5 MG tablet Take 1 tablet (0.5 mg) by mouth every 6 hours as needed for anxiety. 20 tablet 0    melatonin 5 MG tablet Take 10 mg by mouth nightly as needed for sleep (2 x 5mg = 10mg)      nitroGLYcerin (NITROSTAT) 0.4 MG sublingual tablet For chest pain place 1 tablet under the tongue. If symptoms persist repeat every 5 minutes for up to 3 doses. If symptoms persist 5 minutes after 1st dose also call 911. 25 tablet 3    pantoprazole (PROTONIX) 40 MG EC tablet Take 1 " tablet (40 mg) by mouth 2 times daily. 1 in morning , 1 in evening 180 tablet 1    sucralfate (CARAFATE) 1 GM tablet Take 1 tablet (1 g) by mouth 4 times daily. 120 tablet 1          Past Medical History     Past Medical History:   Diagnosis Date    Arthritis     Hypertension     Irritable bowel syndrome     constipation    Mitral valve prolapse     PONV (postoperative nausea and vomiting)     Presence of intrauterine contraceptive device 1997    removed 2014     Past Surgical History:   Procedure Laterality Date    ABDOMEN SURGERY  2/91    ARTHROPLASTY KNEE Right 03/11/2019    Procedure: RIGHT TOTAL KNEE ARTHROPLASTY;  Surgeon: Tate Cohn MD;  Location:  OR    CHOLECYSTECTOMY, OPEN  01/01/1991    COLONOSCOPY  2014    CV CORONARY ANGIOGRAM N/A 3/13/2025    Procedure: Coronary Angiogram;  Surgeon: Chaparro Altamirano MD;  Location:  [facility]     Family History   Problem Relation Age of Onset    Respiratory Mother         emphysema - heavy smoker    Depression Mother     Substance Abuse Mother     Cancer Father         bladder and lung at age 50    Other Cancer Father         Heavy smoker; bladder, lung, pancreatic    Coronary Artery Disease Sister         Age 73, heavy smoker, poor health, copd    Cerebrovascular Disease Sister         TIA Jan 2023    Depression Sister     Substance Abuse Brother     Substance Abuse Maternal Grandfather     Diabetes Paternal Grandmother         adult onset    Arthritis Sister     Breast Cancer Cousin     Substance Abuse Brother             Allergies   Antihistamine [antihistamines, chlorpheniramine-type]; Diphenhydramine; and Latex        Sara Birmingham NP  [facility]

## 2025-04-14 ENCOUNTER — TRANSFERRED RECORDS (OUTPATIENT)
Dept: HEALTH INFORMATION MANAGEMENT | Facility: CLINIC | Age: 70
End: 2025-04-14
Payer: MEDICARE

## 2025-05-12 ENCOUNTER — TRANSFERRED RECORDS (OUTPATIENT)
Dept: HEALTH INFORMATION MANAGEMENT | Facility: CLINIC | Age: 70
End: 2025-05-12
Payer: MEDICARE

## 2025-05-15 ENCOUNTER — TRANSFERRED RECORDS (OUTPATIENT)
Dept: HEALTH INFORMATION MANAGEMENT | Facility: CLINIC | Age: 70
End: 2025-05-15
Payer: MEDICARE

## 2025-07-10 ENCOUNTER — TRANSFERRED RECORDS (OUTPATIENT)
Dept: HEALTH INFORMATION MANAGEMENT | Facility: CLINIC | Age: 70
End: 2025-07-10

## 2025-07-28 DIAGNOSIS — K21.00 GASTROESOPHAGEAL REFLUX DISEASE WITH ESOPHAGITIS, UNSPECIFIED WHETHER HEMORRHAGE: ICD-10-CM

## 2025-07-29 RX ORDER — PANTOPRAZOLE SODIUM 40 MG/1
TABLET, DELAYED RELEASE ORAL
Qty: 180 TABLET | Refills: 1 | Status: SHIPPED | OUTPATIENT
Start: 2025-07-29

## 2025-08-06 SDOH — HEALTH STABILITY: PHYSICAL HEALTH: ON AVERAGE, HOW MANY DAYS PER WEEK DO YOU ENGAGE IN MODERATE TO STRENUOUS EXERCISE (LIKE A BRISK WALK)?: 3 DAYS

## 2025-08-06 SDOH — HEALTH STABILITY: PHYSICAL HEALTH: ON AVERAGE, HOW MANY MINUTES DO YOU ENGAGE IN EXERCISE AT THIS LEVEL?: PATIENT DECLINED

## 2025-08-06 ASSESSMENT — SOCIAL DETERMINANTS OF HEALTH (SDOH): HOW OFTEN DO YOU GET TOGETHER WITH FRIENDS OR RELATIVES?: ONCE A WEEK

## 2025-08-10 ASSESSMENT — PATIENT HEALTH QUESTIONNAIRE - PHQ9
SUM OF ALL RESPONSES TO PHQ QUESTIONS 1-9: 3
10. IF YOU CHECKED OFF ANY PROBLEMS, HOW DIFFICULT HAVE THESE PROBLEMS MADE IT FOR YOU TO DO YOUR WORK, TAKE CARE OF THINGS AT HOME, OR GET ALONG WITH OTHER PEOPLE: SOMEWHAT DIFFICULT
SUM OF ALL RESPONSES TO PHQ QUESTIONS 1-9: 3

## 2025-08-11 ENCOUNTER — OFFICE VISIT (OUTPATIENT)
Dept: FAMILY MEDICINE | Facility: CLINIC | Age: 70
End: 2025-08-11
Payer: MEDICARE

## 2025-08-11 VITALS
RESPIRATION RATE: 14 BRPM | OXYGEN SATURATION: 96 % | DIASTOLIC BLOOD PRESSURE: 80 MMHG | WEIGHT: 213.3 LBS | TEMPERATURE: 97.5 F | HEIGHT: 64 IN | SYSTOLIC BLOOD PRESSURE: 102 MMHG | BODY MASS INDEX: 36.41 KG/M2

## 2025-08-11 DIAGNOSIS — Z13.0 SCREENING FOR DEFICIENCY ANEMIA: ICD-10-CM

## 2025-08-11 DIAGNOSIS — E66.01 CLASS 2 SEVERE OBESITY WITH BODY MASS INDEX (BMI) OF 35 TO 39.9 WITH SERIOUS COMORBIDITY (H): ICD-10-CM

## 2025-08-11 DIAGNOSIS — E66.812 CLASS 2 SEVERE OBESITY WITH BODY MASS INDEX (BMI) OF 35 TO 39.9 WITH SERIOUS COMORBIDITY (H): ICD-10-CM

## 2025-08-11 DIAGNOSIS — F41.9 ANXIETY: ICD-10-CM

## 2025-08-11 DIAGNOSIS — Z98.890 STATUS POST CORONARY ANGIOGRAM: ICD-10-CM

## 2025-08-11 DIAGNOSIS — E78.5 HYPERLIPIDEMIA LDL GOAL <100: ICD-10-CM

## 2025-08-11 DIAGNOSIS — Z00.00 MEDICARE ANNUAL WELLNESS VISIT, SUBSEQUENT: Primary | ICD-10-CM

## 2025-08-11 DIAGNOSIS — K21.00 GASTROESOPHAGEAL REFLUX DISEASE WITH ESOPHAGITIS WITHOUT HEMORRHAGE: ICD-10-CM

## 2025-08-11 DIAGNOSIS — I10 BENIGN ESSENTIAL HYPERTENSION: ICD-10-CM

## 2025-08-11 DIAGNOSIS — F33.1 MODERATE EPISODE OF RECURRENT MAJOR DEPRESSIVE DISORDER (H): ICD-10-CM

## 2025-08-11 LAB
ALBUMIN SERPL BCG-MCNC: 4 G/DL (ref 3.5–5.2)
ALP SERPL-CCNC: 121 U/L (ref 40–150)
ALT SERPL W P-5'-P-CCNC: 22 U/L (ref 0–50)
ANION GAP SERPL CALCULATED.3IONS-SCNC: 8 MMOL/L (ref 7–15)
AST SERPL W P-5'-P-CCNC: 28 U/L (ref 0–45)
BASOPHILS # BLD AUTO: 0 10E3/UL (ref 0–0.2)
BASOPHILS NFR BLD AUTO: 0 %
BILIRUB SERPL-MCNC: 0.9 MG/DL
BUN SERPL-MCNC: 17.7 MG/DL (ref 8–23)
CALCIUM SERPL-MCNC: 9.8 MG/DL (ref 8.8–10.4)
CHLORIDE SERPL-SCNC: 101 MMOL/L (ref 98–107)
CHOLEST SERPL-MCNC: 148 MG/DL
CREAT SERPL-MCNC: 0.69 MG/DL (ref 0.51–0.95)
EGFRCR SERPLBLD CKD-EPI 2021: >90 ML/MIN/1.73M2
EOSINOPHIL # BLD AUTO: 0.1 10E3/UL (ref 0–0.7)
EOSINOPHIL NFR BLD AUTO: 2 %
ERYTHROCYTE [DISTWIDTH] IN BLOOD BY AUTOMATED COUNT: 13.7 % (ref 10–15)
FASTING STATUS PATIENT QL REPORTED: YES
FASTING STATUS PATIENT QL REPORTED: YES
GLUCOSE SERPL-MCNC: 88 MG/DL (ref 70–99)
HCO3 SERPL-SCNC: 28 MMOL/L (ref 22–29)
HCT VFR BLD AUTO: 34.3 % (ref 35–47)
HDLC SERPL-MCNC: 68 MG/DL
HGB BLD-MCNC: 11.1 G/DL (ref 11.7–15.7)
IMM GRANULOCYTES # BLD: 0 10E3/UL
IMM GRANULOCYTES NFR BLD: 0 %
LDLC SERPL CALC-MCNC: 67 MG/DL
LYMPHOCYTES # BLD AUTO: 1.2 10E3/UL (ref 0.8–5.3)
LYMPHOCYTES NFR BLD AUTO: 30 %
MCH RBC QN AUTO: 33.3 PG (ref 26.5–33)
MCHC RBC AUTO-ENTMCNC: 32.4 G/DL (ref 31.5–36.5)
MCV RBC AUTO: 103 FL (ref 78–100)
MONOCYTES # BLD AUTO: 0.3 10E3/UL (ref 0–1.3)
MONOCYTES NFR BLD AUTO: 8 %
NEUTROPHILS # BLD AUTO: 2.5 10E3/UL (ref 1.6–8.3)
NEUTROPHILS NFR BLD AUTO: 60 %
NONHDLC SERPL-MCNC: 80 MG/DL
PLATELET # BLD AUTO: 195 10E3/UL (ref 150–450)
POTASSIUM SERPL-SCNC: 4.6 MMOL/L (ref 3.4–5.3)
PROT SERPL-MCNC: 6.1 G/DL (ref 6.4–8.3)
RBC # BLD AUTO: 3.33 10E6/UL (ref 3.8–5.2)
SODIUM SERPL-SCNC: 137 MMOL/L (ref 135–145)
TRIGL SERPL-MCNC: 63 MG/DL
WBC # BLD AUTO: 4.1 10E3/UL (ref 4–11)

## 2025-08-11 PROCEDURE — 80061 LIPID PANEL: CPT | Performed by: PHYSICIAN ASSISTANT

## 2025-08-11 PROCEDURE — 80053 COMPREHEN METABOLIC PANEL: CPT | Performed by: PHYSICIAN ASSISTANT

## 2025-08-11 PROCEDURE — 36415 COLL VENOUS BLD VENIPUNCTURE: CPT | Performed by: PHYSICIAN ASSISTANT

## 2025-08-11 PROCEDURE — G0439 PPPS, SUBSEQ VISIT: HCPCS | Performed by: PHYSICIAN ASSISTANT

## 2025-08-11 PROCEDURE — 3074F SYST BP LT 130 MM HG: CPT | Performed by: PHYSICIAN ASSISTANT

## 2025-08-11 PROCEDURE — 85025 COMPLETE CBC W/AUTO DIFF WBC: CPT | Performed by: PHYSICIAN ASSISTANT

## 2025-08-11 PROCEDURE — 99214 OFFICE O/P EST MOD 30 MIN: CPT | Mod: 25 | Performed by: PHYSICIAN ASSISTANT

## 2025-08-11 PROCEDURE — 3079F DIAST BP 80-89 MM HG: CPT | Performed by: PHYSICIAN ASSISTANT

## 2025-08-11 RX ORDER — FAMOTIDINE 40 MG/1
20 TABLET, FILM COATED ORAL
COMMUNITY
Start: 2025-06-13

## 2025-08-11 RX ORDER — LORAZEPAM 0.5 MG/1
0.5 TABLET ORAL EVERY 6 HOURS PRN
Qty: 20 TABLET | Refills: 0 | Status: SHIPPED | OUTPATIENT
Start: 2025-08-11

## 2025-08-11 RX ORDER — ATORVASTATIN CALCIUM 20 MG/1
20 TABLET, FILM COATED ORAL DAILY
Qty: 90 TABLET | Refills: 3 | Status: SHIPPED | OUTPATIENT
Start: 2025-08-11

## 2025-08-11 RX ORDER — IBUPROFEN 200 MG
200 TABLET ORAL EVERY 4 HOURS PRN
COMMUNITY

## 2025-08-11 RX ORDER — LISINOPRIL AND HYDROCHLOROTHIAZIDE 10; 12.5 MG/1; MG/1
1 TABLET ORAL DAILY
Qty: 90 TABLET | Refills: 3 | Status: SHIPPED | OUTPATIENT
Start: 2025-08-11

## (undated) DEVICE — HOOD FLYTE W/PEELAWAY 408-800-100

## (undated) DEVICE — DRSG XEROFORM 5X9" 8884431605

## (undated) DEVICE — BLADE SAW SAGITTAL STRK 25X90X1.27MM HD SYS 6 6125-127-090

## (undated) DEVICE — BONE CLEANING TIP INTERPULSE  0210-010-000

## (undated) DEVICE — CAST PADDING 4" UNSTERILE 9044

## (undated) DEVICE — BLADE CLIPPER 4412A

## (undated) DEVICE — MANIFOLD NEPTUNE 4 PORT 700-20

## (undated) DEVICE — SU VICRYL 2-0 CP-1 27" UND J266H

## (undated) DEVICE — GLOVE PROTEXIS POWDER FREE 8.5 ORTHOPEDIC 2D73ET85

## (undated) DEVICE — MANIFOLD KIT ANGIO AUTOMATED 014613

## (undated) DEVICE — SU ETHIBOND 0 CTX CR  8X18" CX31D

## (undated) DEVICE — BONE CEMENT MIXEVAC III HI VAC KIT  0206-015-000

## (undated) DEVICE — GLOVE PROTEXIS POWDER FREE 8.0 ORTHOPEDIC 2D73ET80

## (undated) DEVICE — PACK TOTAL KNEE SOP15TKFSD

## (undated) DEVICE — INTRO GLIDESHEATH SLENDER 6FR 10X45CM 60-1060

## (undated) DEVICE — PREP CHLORAPREP 26ML TINTED ORANGE  260815

## (undated) DEVICE — SOL WATER IRRIG 1000ML BOTTLE 2F7114

## (undated) DEVICE — CATH DIAGNOSTIC RADIAL 5FR TIG 4.0

## (undated) DEVICE — SLEEVE TR BAND RADIAL COMPRESSION DEVICE 24CM TRB24-REG

## (undated) DEVICE — BLADE SAW SAGITTAL STRK 29X83.5X1.27MM 4/2000 2108-183-000

## (undated) DEVICE — SUCTION IRR SYSTEM W/O TIP INTERPULSE HANDPIECE 0210-100-000

## (undated) DEVICE — WIRE GUIDE 0.035"X260CM SAFE-T-J EXCHANGE G00517

## (undated) DEVICE — DRAIN ROUND W/RESERV KIT JACKSON PRATT 10FR 400ML SU130-402D

## (undated) DEVICE — BLADE SAW SAGITTAL STRK 21X90X1.27MM HD SYS 6 6221-127-090

## (undated) DEVICE — GLOVE PROTEXIS W/NEU-THERA 8.0  2D73TE80

## (undated) DEVICE — LINEN TOWEL PACK X5 5464

## (undated) DEVICE — CAST PADDING 6" UNSTERILE 9046

## (undated) DEVICE — ESU GROUND PAD UNIVERSAL W/O CORD

## (undated) DEVICE — KIT HAND CONTROL ANGIOTOUCH ACIST 65CM AT-P65

## (undated) DEVICE — TOTE ANGIO CORP PC15AT SAN32CC83O

## (undated) DEVICE — NDL SPINAL 18GA 3.5" 405184

## (undated) DEVICE — SYR 30ML LL W/O NDL 302832

## (undated) DEVICE — DRSG KERLIX FLUFFS X5

## (undated) DEVICE — DEFIB PRO-PADZ LVP LQD GEL ADULT 8900-2105-01

## (undated) DEVICE — CATH ANGIO JUDKINS R4 6FRX100CM INFINITI 534621T

## (undated) DEVICE — CAST PADDING 6" STERILE 9046S

## (undated) DEVICE — GOWN IMPERVIOUS SPECIALTY XL/XLONG 39049

## (undated) DEVICE — WRAP EZY KNEE

## (undated) DEVICE — SU STRATAFIX PDS PLUS 0 CT 45CM SXPP1A406

## (undated) DEVICE — IMM KNEE 20" 0814-2660

## (undated) RX ORDER — FENTANYL CITRATE 50 UG/ML
INJECTION, SOLUTION INTRAMUSCULAR; INTRAVENOUS
Status: DISPENSED
Start: 2019-03-11

## (undated) RX ORDER — FENTANYL CITRATE 50 UG/ML
INJECTION, SOLUTION INTRAMUSCULAR; INTRAVENOUS
Status: DISPENSED
Start: 2025-03-13

## (undated) RX ORDER — HEPARIN SODIUM 200 [USP'U]/100ML
INJECTION, SOLUTION INTRAVENOUS
Status: DISPENSED
Start: 2025-03-13

## (undated) RX ORDER — LIDOCAINE HYDROCHLORIDE 10 MG/ML
INJECTION, SOLUTION EPIDURAL; INFILTRATION; INTRACAUDAL; PERINEURAL
Status: DISPENSED
Start: 2025-03-13

## (undated) RX ORDER — VERAPAMIL HYDROCHLORIDE 2.5 MG/ML
INJECTION, SOLUTION INTRAVENOUS
Status: DISPENSED
Start: 2025-03-13

## (undated) RX ORDER — PROPOFOL 10 MG/ML
INJECTION, EMULSION INTRAVENOUS
Status: DISPENSED
Start: 2019-03-11

## (undated) RX ORDER — LIDOCAINE HYDROCHLORIDE 20 MG/ML
INJECTION, SOLUTION EPIDURAL; INFILTRATION; INTRACAUDAL; PERINEURAL
Status: DISPENSED
Start: 2019-03-11

## (undated) RX ORDER — HEPARIN SODIUM 1000 [USP'U]/ML
INJECTION, SOLUTION INTRAVENOUS; SUBCUTANEOUS
Status: DISPENSED
Start: 2025-03-13

## (undated) RX ORDER — KETOROLAC TROMETHAMINE 30 MG/ML
INJECTION, SOLUTION INTRAMUSCULAR; INTRAVENOUS
Status: DISPENSED
Start: 2019-03-11

## (undated) RX ORDER — ACETAMINOPHEN 325 MG/1
TABLET ORAL
Status: DISPENSED
Start: 2025-03-13

## (undated) RX ORDER — CEFAZOLIN SODIUM 2 G/100ML
INJECTION, SOLUTION INTRAVENOUS
Status: DISPENSED
Start: 2019-03-11

## (undated) RX ORDER — NEOSTIGMINE METHYLSULFATE 1 MG/ML
VIAL (ML) INJECTION
Status: DISPENSED
Start: 2019-03-11

## (undated) RX ORDER — ONDANSETRON 2 MG/ML
INJECTION INTRAMUSCULAR; INTRAVENOUS
Status: DISPENSED
Start: 2019-03-11